# Patient Record
Sex: FEMALE | Race: WHITE | Employment: FULL TIME | URBAN - METROPOLITAN AREA
[De-identification: names, ages, dates, MRNs, and addresses within clinical notes are randomized per-mention and may not be internally consistent; named-entity substitution may affect disease eponyms.]

---

## 2020-12-01 LAB — PAP SMEAR, EXTERNAL: NORMAL

## 2021-09-03 ENCOUNTER — TELEPHONE (OUTPATIENT)
Dept: PRIMARY CARE CLINIC | Age: 51
End: 2021-09-03

## 2021-09-03 NOTE — TELEPHONE ENCOUNTER
----- Message from Loney Kayser sent at 9/3/2021  1:34 PM EDT -----  Subject: Message to Provider    QUESTIONS  Information for Provider? Patient is wanting to be a new patient of Luis White and there has been some miscommunication during the process. She   was told that Dr. Alta Fisher would look into accepting her and then she   would be called, but she received two calls and there is nothing   documented and no one from this office called her. She is just wanting to   be a new patient, and there is no availability with him.   ---------------------------------------------------------------------------  --------------  CALL BACK INFO  What is the best way for the office to contact you? OK to leave message on   voicemail  Preferred Call Back Phone Number? [phone]  ---------------------------------------------------------------------------  --------------  SCRIPT ANSWERS  Relationship to Patient?  Self

## 2021-09-29 ENCOUNTER — OFFICE VISIT (OUTPATIENT)
Dept: PRIMARY CARE CLINIC | Age: 51
End: 2021-09-29
Payer: COMMERCIAL

## 2021-09-29 VITALS
BODY MASS INDEX: 41.95 KG/M2 | HEART RATE: 80 BPM | OXYGEN SATURATION: 98 % | HEIGHT: 70 IN | SYSTOLIC BLOOD PRESSURE: 130 MMHG | TEMPERATURE: 97.1 F | DIASTOLIC BLOOD PRESSURE: 84 MMHG | WEIGHT: 293 LBS

## 2021-09-29 DIAGNOSIS — M17.0 PRIMARY OSTEOARTHRITIS OF BOTH KNEES: ICD-10-CM

## 2021-09-29 DIAGNOSIS — E11.9 TYPE 2 DIABETES MELLITUS WITHOUT COMPLICATION, WITHOUT LONG-TERM CURRENT USE OF INSULIN (HCC): ICD-10-CM

## 2021-09-29 DIAGNOSIS — E03.9 HYPOTHYROIDISM, UNSPECIFIED TYPE: Primary | ICD-10-CM

## 2021-09-29 DIAGNOSIS — M35.00 SJOGREN'S SYNDROME, WITH UNSPECIFIED ORGAN INVOLVEMENT (HCC): ICD-10-CM

## 2021-09-29 PROCEDURE — 3017F COLORECTAL CA SCREEN DOC REV: CPT | Performed by: FAMILY MEDICINE

## 2021-09-29 PROCEDURE — 2022F DILAT RTA XM EVC RTNOPTHY: CPT | Performed by: FAMILY MEDICINE

## 2021-09-29 PROCEDURE — G8427 DOCREV CUR MEDS BY ELIG CLIN: HCPCS | Performed by: FAMILY MEDICINE

## 2021-09-29 PROCEDURE — G8419 CALC BMI OUT NRM PARAM NOF/U: HCPCS | Performed by: FAMILY MEDICINE

## 2021-09-29 PROCEDURE — 99204 OFFICE O/P NEW MOD 45 MIN: CPT | Performed by: FAMILY MEDICINE

## 2021-09-29 PROCEDURE — 3046F HEMOGLOBIN A1C LEVEL >9.0%: CPT | Performed by: FAMILY MEDICINE

## 2021-09-29 PROCEDURE — 1036F TOBACCO NON-USER: CPT | Performed by: FAMILY MEDICINE

## 2021-09-29 RX ORDER — OMEGA-3 FATTY ACIDS/FISH OIL 300-1000MG
1 CAPSULE ORAL
COMMUNITY
End: 2022-02-14

## 2021-09-29 RX ORDER — VITAMIN B COMPLEX
1 CAPSULE ORAL DAILY
COMMUNITY

## 2021-09-29 RX ORDER — TRAMADOL HYDROCHLORIDE 50 MG/1
50 TABLET ORAL EVERY 6 HOURS PRN
COMMUNITY
End: 2021-09-29 | Stop reason: SDUPTHER

## 2021-09-29 RX ORDER — TRAMADOL HYDROCHLORIDE 50 MG/1
50 TABLET ORAL EVERY 8 HOURS PRN
Qty: 90 TABLET | Refills: 0 | Status: SHIPPED | OUTPATIENT
Start: 2021-09-29 | End: 2021-11-03 | Stop reason: SDUPTHER

## 2021-09-29 RX ORDER — LEVOTHYROXINE SODIUM 0.07 MG/1
75 TABLET ORAL DAILY
COMMUNITY
End: 2021-11-11 | Stop reason: SDUPTHER

## 2021-09-29 RX ORDER — BUDESONIDE AND FORMOTEROL FUMARATE DIHYDRATE 80; 4.5 UG/1; UG/1
2 AEROSOL RESPIRATORY (INHALATION) DAILY
COMMUNITY
End: 2022-04-15

## 2021-09-29 ASSESSMENT — PATIENT HEALTH QUESTIONNAIRE - PHQ9
SUM OF ALL RESPONSES TO PHQ QUESTIONS 1-9: 0
1. LITTLE INTEREST OR PLEASURE IN DOING THINGS: 0
2. FEELING DOWN, DEPRESSED OR HOPELESS: 0
SUM OF ALL RESPONSES TO PHQ9 QUESTIONS 1 & 2: 0

## 2021-09-29 ASSESSMENT — ENCOUNTER SYMPTOMS
EYES NEGATIVE: 1
ALLERGIC/IMMUNOLOGIC NEGATIVE: 1
GASTROINTESTINAL NEGATIVE: 1
RESPIRATORY NEGATIVE: 1

## 2021-09-29 NOTE — PROGRESS NOTES
21     Janae Rod    : 1970 Sex: female   Age: 46 y.o. Chief Complaint   Patient presents with    New Patient     was seeing Elijah Phillips in [place]        Prior to Admission medications    Medication Sig Start Date End Date Taking? Authorizing Provider   budesonide-formoterol (SYMBICORT) 80-4.5 MCG/ACT AERO Inhale 2 puffs into the lungs daily   Yes Historical Provider, MD   levothyroxine (SYNTHROID) 75 MCG tablet Take 75 mcg by mouth Daily   Yes Historical Provider, MD   metFORMIN (GLUCOPHAGE) 500 MG tablet Take 500 mg by mouth 2 times daily (with meals)   Yes Historical Provider, MD   CINNAMON PO Take by mouth   Yes Historical Provider, MD   TURMERIC PO Take by mouth   Yes Historical Provider, MD   Calcium Citrate-Vitamin D (CALCIUM + D PO) Take by mouth   Yes Historical Provider, MD   Ferrous Sulfate (IRON PO) Take by mouth   Yes Historical Provider, MD   b complex vitamins capsule Take 1 capsule by mouth daily   Yes Historical Provider, MD   ZINC PO Take by mouth   Yes Historical Provider, MD   Ascorbic Acid (VITAMIN C PO) Take by mouth   Yes Historical Provider, MD   Omega-3 Fatty Acids (FISH OIL PO) Take by mouth   Yes Historical Provider, MD   ibuprofen (ADVIL;MOTRIN) 200 MG CAPS Take 1 capsule by mouth   Yes Historical Provider, MD   PARDO PO Take by mouth   Yes Historical Provider, MD   Homeopathic Products (ARNICA EX) Apply topically   Yes Historical Provider, MD   traMADol (ULTRAM) 50 MG tablet Take 1 tablet by mouth every 8 hours as needed for Pain for up to 30 days. 9/29/21 10/29/21 Yes Angela Kerr DO          HPI: Patient evaluated today medical issues of hypothyroidism diabetes mellitus questionable Sjogren's disease severe degenerative joint disease of knees bilaterally all by history. Recently moved here from [place] this past month extensive medical history reviewed today. Medications all reviewed and refills provided.   Lab studies will be completed and we will then Chest    HERNIA REPAIR      Umbilical    KIDNEY STONE REMOVAL       No family history on file. Past Medical History:   Diagnosis Date    Diverticulitis     Hypothyroidism     Sjogren's disease ([facility]     Type 2 diabetes mellitus without complication ([facility]        Vitals:    09/29/21 1418   BP: 130/84   Pulse: 80   Temp: 97.1 °F (36.2 °C)   SpO2: 98%   Weight: (!) 380 lb (172.4 kg)   Height: 5' 10\" (1.778 m)     BP Readings from Last 3 Encounters:   09/29/21 130/84        Physical Exam  Vitals and nursing note reviewed. Constitutional:       Appearance: She is well-developed. HENT:      Head: Normocephalic. Right Ear: External ear normal.      Left Ear: External ear normal.      Nose: Nose normal.   Eyes:      Conjunctiva/sclera: Conjunctivae normal.      Pupils: Pupils are equal, round, and reactive to light. Cardiovascular:      Rate and Rhythm: Normal rate. Pulmonary:      Breath sounds: Normal breath sounds. Abdominal:      General: Bowel sounds are normal.      Palpations: Abdomen is soft. Musculoskeletal:         General: Normal range of motion. Cervical back: Normal range of motion and neck supple. Skin:     General: Skin is warm and dry. Neurological:      Mental Status: She is alert and oriented to person, place, and time. Psychiatric:         Behavior: Behavior normal.        Physical exam findings are actually stable. Blood pressure was well controlled. Heart is controlled lungs are clear. Medications reviewed and some refills provided on tramadol but we will further assess knees with x-ray studies and then further recommendations. All medications reviewed and maintain as prescribed. Lab studies to be completed and follow-up with next visit. Plan Per Assessment:  Nazario Young was seen today for new patient.     Diagnoses and all orders for this visit:    Hypothyroidism, unspecified type  -     CBC Auto Differential; Future  -     Comprehensive Metabolic Panel; Future  -     Hemoglobin A1C; Future  -     Lipid Panel; Future  -     T4; Future  -     TSH without Reflex; Future  -     PHYLLIS; Future  -     Rheumatoid Factor; Future  -     CYCLIC CITRUL PEPTIDE ANTIBODY, IGG; Future  -     SJOGREN'S ANTIBODIES (SS-A, SS-B); Future    Type 2 diabetes mellitus without complication, without long-term current use of insulin (HCC)  -     CBC Auto Differential; Future  -     Comprehensive Metabolic Panel; Future  -     Hemoglobin A1C; Future  -     Lipid Panel; Future  -     T4; Future  -     TSH without Reflex; Future  -     PHYLLIS; Future  -     Rheumatoid Factor; Future  -     CYCLIC CITRUL PEPTIDE ANTIBODY, IGG; Future  -     SJOGREN'S ANTIBODIES (SS-A, SS-B); Future    Sjogren's syndrome, with unspecified organ involvement ([facility] [address]  -     CBC Auto Differential; Future  -     Comprehensive Metabolic Panel; Future  -     Hemoglobin A1C; Future  -     Lipid Panel; Future  -     T4; Future  -     TSH without Reflex; Future  -     PHYLLIS; Future  -     Rheumatoid Factor; Future  -     CYCLIC CITRUL PEPTIDE ANTIBODY, IGG; Future  -     SJOGREN'S ANTIBODIES (SS-A, SS-B); Future    Primary osteoarthritis of both knees  -     CBC Auto Differential; Future  -     Comprehensive Metabolic Panel; Future  -     Hemoglobin A1C; Future  -     Lipid Panel; Future  -     T4; Future  -     TSH without Reflex; Future  -     PHYLLIS; Future  -     Rheumatoid Factor; Future  -     XR KNEE RIGHT (3 VIEWS); Future  -     XR KNEE LEFT (3 VIEWS); Future  -     CYCLIC CITRUL PEPTIDE ANTIBODY, IGG; Future  -     SJOGREN'S ANTIBODIES (SS-A, SS-B); Future  -     traMADol (ULTRAM) 50 MG tablet; Take 1 tablet by mouth every 8 hours as needed for Pain for up to 30 days. Return in about 1 week (around 10/6/2021). Nadiya Malcolm DO    Note was generated with the assistance of voice recognition software. Document was reviewed however may contain grammatical errors.

## 2021-10-11 ENCOUNTER — OFFICE VISIT (OUTPATIENT)
Dept: PRIMARY CARE CLINIC | Age: 51
End: 2021-10-11
Payer: COMMERCIAL

## 2021-10-11 VITALS
DIASTOLIC BLOOD PRESSURE: 84 MMHG | HEIGHT: 70 IN | BODY MASS INDEX: 54.52 KG/M2 | OXYGEN SATURATION: 98 % | HEART RATE: 88 BPM | TEMPERATURE: 96.9 F | SYSTOLIC BLOOD PRESSURE: 130 MMHG

## 2021-10-11 DIAGNOSIS — M17.0 PRIMARY OSTEOARTHRITIS OF BOTH KNEES: Primary | ICD-10-CM

## 2021-10-11 DIAGNOSIS — Z23 NEED FOR INFLUENZA VACCINATION: ICD-10-CM

## 2021-10-11 DIAGNOSIS — E03.9 HYPOTHYROIDISM, UNSPECIFIED TYPE: ICD-10-CM

## 2021-10-11 DIAGNOSIS — E11.9 TYPE 2 DIABETES MELLITUS WITHOUT COMPLICATION, WITHOUT LONG-TERM CURRENT USE OF INSULIN (HCC): ICD-10-CM

## 2021-10-11 DIAGNOSIS — M05.80 POLYARTHRITIS WITH POSITIVE RHEUMATOID FACTOR (HCC): ICD-10-CM

## 2021-10-11 PROCEDURE — G8427 DOCREV CUR MEDS BY ELIG CLIN: HCPCS | Performed by: FAMILY MEDICINE

## 2021-10-11 PROCEDURE — 2022F DILAT RTA XM EVC RTNOPTHY: CPT | Performed by: FAMILY MEDICINE

## 2021-10-11 PROCEDURE — 3017F COLORECTAL CA SCREEN DOC REV: CPT | Performed by: FAMILY MEDICINE

## 2021-10-11 PROCEDURE — 1036F TOBACCO NON-USER: CPT | Performed by: FAMILY MEDICINE

## 2021-10-11 PROCEDURE — 99214 OFFICE O/P EST MOD 30 MIN: CPT | Performed by: FAMILY MEDICINE

## 2021-10-11 PROCEDURE — G8417 CALC BMI ABV UP PARAM F/U: HCPCS | Performed by: FAMILY MEDICINE

## 2021-10-11 PROCEDURE — G8482 FLU IMMUNIZE ORDER/ADMIN: HCPCS | Performed by: FAMILY MEDICINE

## 2021-10-11 PROCEDURE — 3051F HG A1C>EQUAL 7.0%<8.0%: CPT | Performed by: FAMILY MEDICINE

## 2021-10-11 PROCEDURE — 90471 IMMUNIZATION ADMIN: CPT | Performed by: FAMILY MEDICINE

## 2021-10-11 PROCEDURE — 90674 CCIIV4 VAC NO PRSV 0.5 ML IM: CPT | Performed by: FAMILY MEDICINE

## 2021-10-11 ASSESSMENT — ENCOUNTER SYMPTOMS
GASTROINTESTINAL NEGATIVE: 1
ALLERGIC/IMMUNOLOGIC NEGATIVE: 1
RESPIRATORY NEGATIVE: 1
EYES NEGATIVE: 1

## 2021-10-11 NOTE — PROGRESS NOTES
10/11/21     Mayte Dai    : 1970 Sex: female   Age: 46 y.o. Chief Complaint   Patient presents with   [address]       Prior to Admission medications    Medication Sig Start Date End Date Taking? Authorizing Provider   budesonide-formoterol (SYMBICORT) 80-4.5 MCG/ACT AERO Inhale 2 puffs into the lungs daily   Yes Historical Provider, MD   levothyroxine (SYNTHROID) 75 MCG tablet Take 75 mcg by mouth Daily   Yes Historical Provider, MD   metFORMIN (GLUCOPHAGE) 500 MG tablet Take 500 mg by mouth 2 times daily (with meals)   Yes Historical Provider, MD   CINNAMON PO Take by mouth   Yes Historical Provider, MD   TURMERIC PO Take by mouth   Yes Historical Provider, MD   Calcium Citrate-Vitamin D (CALCIUM + D PO) Take by mouth   Yes Historical Provider, MD   Ferrous Sulfate (IRON PO) Take by mouth   Yes Historical Provider, MD   b complex vitamins capsule Take 1 capsule by mouth daily   Yes Historical Provider, MD   ZINC PO Take by mouth   Yes Historical Provider, MD   Ascorbic Acid (VITAMIN C PO) Take by mouth   Yes Historical Provider, MD   Omega-3 Fatty Acids (FISH OIL PO) Take by mouth   Yes Historical Provider, MD   ibuprofen (ADVIL;MOTRIN) 200 MG CAPS Take 1 capsule by mouth   Yes Historical Provider, MD   PARDO PO Take by mouth   Yes Historical Provider, MD   Homeopathic Products (ARNICA EX) Apply topically   Yes Historical Provider, MD   traMADol (ULTRAM) 50 MG tablet Take 1 tablet by mouth every 8 hours as needed for Pain for up to 30 days. 9/29/21 10/29/21 Yes Nancy Robledo DO          HPI: Patient evaluated this morning issues of osteoarthritic disease of the knees diabetes hypothyroid rheumatoid factor positive. We are going to refer her to both orthopedics as well as rheumatology for consultation. Medications reviewed maintain as prescribed. Flu shot to be updated today. Review of Systems   Constitutional: Negative. HENT: Negative. Eyes: Negative.     Respiratory: Negative. Gastrointestinal: Negative. Endocrine: Negative. Genitourinary: Negative. Musculoskeletal: Positive for arthralgias. Skin: Negative. Allergic/Immunologic: Negative. Neurological: Negative. Hematological: Negative. Psychiatric/Behavioral: Negative. Today systems review is stable and we will maintain present meds care. Current Outpatient Medications:     budesonide-formoterol (SYMBICORT) 80-4.5 MCG/ACT AERO, Inhale 2 puffs into the lungs daily, Disp: , Rfl:     levothyroxine (SYNTHROID) 75 MCG tablet, Take 75 mcg by mouth Daily, Disp: , Rfl:     metFORMIN (GLUCOPHAGE) 500 MG tablet, Take 500 mg by mouth 2 times daily (with meals), Disp: , Rfl:     CINNAMON PO, Take by mouth, Disp: , Rfl:     TURMERIC PO, Take by mouth, Disp: , Rfl:     Calcium Citrate-Vitamin D (CALCIUM + D PO), Take by mouth, Disp: , Rfl:     Ferrous Sulfate (IRON PO), Take by mouth, Disp: , Rfl:     b complex vitamins capsule, Take 1 capsule by mouth daily, Disp: , Rfl:     ZINC PO, Take by mouth, Disp: , Rfl:     Ascorbic Acid (VITAMIN C PO), Take by mouth, Disp: , Rfl:     Omega-3 Fatty Acids (FISH OIL PO), Take by mouth, Disp: , Rfl:     ibuprofen (ADVIL;MOTRIN) 200 MG CAPS, Take 1 capsule by mouth, Disp: , Rfl:     PARDO PO, Take by mouth, Disp: , Rfl:     Homeopathic Products (ARNICA EX), Apply topically, Disp: , Rfl:     traMADol (ULTRAM) 50 MG tablet, Take 1 tablet by mouth every 8 hours as needed for Pain for up to 30 days. , Disp: 90 tablet, Rfl: 0    No Known Allergies    Social History     Tobacco Use    Smoking status: Never Smoker    Smokeless tobacco: Never Used   Substance Use Topics    Alcohol use: Not on file    Drug use: Not on file      Past Surgical History:   Procedure Laterality Date    ANKLE SURGERY Bilateral      SECTION      CYST REMOVAL      Chest    HERNIA REPAIR      Umbilical    KIDNEY STONE REMOVAL       No family history on file.   Past Medical History:   Diagnosis Date    Diverticulitis     Hypothyroidism     Sjogren's disease ([facility] [address]     Type 2 diabetes mellitus without complication ([facility]        Vitals:    10/11/21 0859   BP: 130/84   Pulse: 88   Temp: 96.9 °F (36.1 °C)   SpO2: 98%   Height: 5' 10\" (1.778 m)     BP Readings from Last 3 Encounters:   10/11/21 130/84   09/29/21 130/84        Physical Exam  Vitals and nursing note reviewed. Constitutional:       Appearance: She is well-developed. HENT:      Head: Normocephalic. Right Ear: External ear normal.      Left Ear: External ear normal.      Nose: Nose normal.   Eyes:      Conjunctiva/sclera: Conjunctivae normal.      Pupils: Pupils are equal, round, and reactive to light. Cardiovascular:      Rate and Rhythm: Normal rate. Pulmonary:      Breath sounds: Normal breath sounds. Abdominal:      General: Bowel sounds are normal.      Palpations: Abdomen is soft. Musculoskeletal:         General: Normal range of motion. Cervical back: Normal range of motion and neck supple. Skin:     General: Skin is warm and dry. Neurological:      Mental Status: She is alert and oriented to person, place, and time. Psychiatric:         Behavior: Behavior normal.        Present vitals physical examination stable. Heart was regular lungs are clear. Reviewed labs and all quite stable with an A1c at 7.0. We will maintain present meds care and reassess again 4 weeks after consultations with rheumatology and orthopedics. Plan Per Assessment:  Abilio Ricketts was seen today for discuss labs.     Diagnoses and all orders for this visit:    Primary osteoarthritis of both knees  [facility]ides [facility]    Type 2 diabetes mellitus without complication, without long-term current use of insulin ([facility] [address]    Hypothyroidism, unspecified type    [facility] positive [facility] factor ([facility]  [facility] [id number]    Need for influenza vaccination  -     INFLUENZA, MDCK QUADV, 2 YRS AND OLDER, IM, PF, PREFILL SYR OR SDV, 0.5ML (FLUCELVAX QUADV, PF)            Return in about 4 weeks (around 11/8/2021). Terri Garnica DO    Note was generated with the assistance of voice recognition software. Document was reviewed however may contain grammatical errors.

## 2021-11-03 DIAGNOSIS — M17.0 PRIMARY OSTEOARTHRITIS OF BOTH KNEES: ICD-10-CM

## 2021-11-03 RX ORDER — TRAMADOL HYDROCHLORIDE 50 MG/1
50 TABLET ORAL EVERY 8 HOURS PRN
Qty: 90 TABLET | Refills: 0 | Status: SHIPPED
Start: 2021-11-03 | End: 2021-12-06 | Stop reason: SDUPTHER

## 2021-11-03 NOTE — TELEPHONE ENCOUNTER
----- Message from Muriel Moreno MA sent at 11/3/2021  2:22 PM EDT -----  Subject: Refill Request    QUESTIONS  Name of Medication? traMADol (ULTRAM) 50 MG tablet  Patient-reported dosage and instructions? 50 MG, TID   How many days do you have left? 0  [facility]? [id number]  Pharmacy phone number (if available)? [phone]  Additional Information for Provider? Etienne Beckwith called Sunday/ Monday for   refill and still haven't heard.   ---------------------------------------------------------------------------  --------------  CALL BACK INFO  What is the best way for the office to contact you? OK to leave message on   voicemail  Preferred Call Back Phone Number?  [phone]

## 2021-11-08 ENCOUNTER — OFFICE VISIT (OUTPATIENT)
Dept: RHEUMATOLOGY | Age: 51
End: 2021-11-08
Payer: COMMERCIAL

## 2021-11-08 VITALS
OXYGEN SATURATION: 98 % | DIASTOLIC BLOOD PRESSURE: 82 MMHG | HEART RATE: 80 BPM | HEIGHT: 70 IN | BODY MASS INDEX: 41.95 KG/M2 | WEIGHT: 293 LBS | SYSTOLIC BLOOD PRESSURE: 134 MMHG

## 2021-11-08 DIAGNOSIS — M17.0 PRIMARY OSTEOARTHRITIS OF BOTH KNEES: ICD-10-CM

## 2021-11-08 DIAGNOSIS — M05.80 POLYARTHRITIS WITH POSITIVE RHEUMATOID FACTOR (HCC): Primary | ICD-10-CM

## 2021-11-08 DIAGNOSIS — R21 RASH: ICD-10-CM

## 2021-11-08 PROBLEM — M35.00 SJOGREN'S SYNDROME (HCC): Status: RESOLVED | Noted: 2021-09-29 | Resolved: 2021-11-08

## 2021-11-08 LAB
C-REACTIVE PROTEIN: 2.4 MG/DL (ref 0–0.4)
SEDIMENTATION RATE, ERYTHROCYTE: 51 MM/HR (ref 0–20)

## 2021-11-08 PROCEDURE — 99204 OFFICE O/P NEW MOD 45 MIN: CPT | Performed by: INTERNAL MEDICINE

## 2021-11-08 RX ORDER — CELECOXIB 200 MG/1
200 CAPSULE ORAL 2 TIMES DAILY PRN
Qty: 60 CAPSULE | Refills: 3 | Status: SHIPPED
Start: 2021-11-08 | End: 2022-02-01 | Stop reason: SDUPTHER

## 2021-11-08 ASSESSMENT — ENCOUNTER SYMPTOMS
COLOR CHANGE: 0
NAUSEA: 0
TROUBLE SWALLOWING: 0
BACK PAIN: 1
ABDOMINAL PAIN: 0
SHORTNESS OF BREATH: 0
VOMITING: 0
COUGH: 0
DIARRHEA: 0

## 2021-11-08 NOTE — PROGRESS NOTES
Claudene Drivers 1970 is a 46 y.o. female, here for evaluation of the following chief complaint(s):  New Patient (Polyarthritis with positive rheumatoid factor)         ASSESSMENT/PLAN:    Claudene Drivers 1970 is a 46 y.o. female seen in consult for polyarthritis with positive rheumatoid factor. 1.  Polyarthritis-  She does have a rheumatoid factor of 15 which is a very low positive. CCP antibody is negative. She has known underlying osteoarthritis. I see no obvious synovitis on exam but exam is somewhat difficult due to body habitus. My suspicion for rheumatoid arthritis is low but will need to get further work-up as below. 2.  Osteoarthritis-either way she has underlying osteoarthritis. We will try her on Celebrex 200 mg twice daily as needed with food. Also advised that she can try Voltaren gel in the knees. 3.  Rash-on the face crossing the nasolabial folds. Lupus rashes do not cross the nasolabial folds and she has a negative PHYLLIS. I do not think it is an autoimmune rash. She may want to consider a dermatology evaluation. If work-up below is unrevealing she can follow-up with me on a as needed basis. If I see anything concerning on work-up below I will have her back for follow-up as appropriate. 1. Polyarthritis with positive rheumatoid factor (HCC)  -     C-Reactive Protein; Future  -     Sedimentation Rate; Future  -     XR HAND LEFT (MIN 3 VIEWS); Future  -     XR HAND RIGHT (MIN 3 VIEWS); Future  2. Primary osteoarthritis of both knees  3. Rash      Return if symptoms worsen or fail to improve. Subjective   SUBJECTIVE/OBJECTIVE:    HPI: Claudene Drivers 1970 is a 46 y.o. female seen in [place] for polyarthritis with positive rheumatoid factor. Patient states that she was living in [place] prior to moving here and did see a rheumatologist up there who diagnosed her with Sjogren's syndrome and benign hypermobility syndrome. However she has no dry eyes or dry mouth. Her PCP did blood work which shows a negative SSA and SSB. Has a negative PHYLLIS. She did however have very low positive rheumatoid factor of 15. Her CCP antibody is negative. Knees are most bothersome and x-rays show significant osteoarthritis. She will likely ultimately need replacements. Injections have not been beneficial.  Otherwise she does get some pain in the right hip and sometimes some swelling in the hands and wrists. Her joint pain seems to be worse at the end of the day. She is a  and does type a lot. She does have tramadol which helps. She also takes 2 Advil in the morning which helps to some degree. She has been having a rash on her face lately but otherwise no other extra-articular manifestations. Past Medical History:   Diagnosis Date    Diverticulitis     Hypothyroidism     S[facility]ogren's disease ([facility] [address] [facility]     Type 2 diabetes mellitus without complication ([facility] [address]         Review of Systems   Constitutional: Negative for fatigue and fever. HENT: Negative for mouth sores and trouble swallowing. Respiratory: Negative for cough and shortness of breath. Cardiovascular: Negative for chest pain. Gastrointestinal: Negative for abdominal pain, diarrhea, nausea and vomiting. Genitourinary: Negative for dysuria and hematuria. Musculoskeletal: Positive for arthralgias, back pain and joint swelling. Skin: Positive for rash. Negative for color change. Neurological: Negative for weakness and numbness. Hematological: Negative for adenopathy. All other systems reviewed and are negative. Objective   Vitals:    11/08/21 0834   BP: 134/82   Pulse: 80   SpO2: 98%      Physical Exam  Constitutional:       General: She is not in acute distress. Appearance: Normal appearance. HENT:      Head: Normocephalic and atraumatic. Right Ear: External ear normal.      Left Ear: External ear normal.      Nose: Nose normal.   Eyes:      General: No scleral icterus.   Pulmonary: Effort: Pulmonary effort is normal.   Musculoskeletal:         General: Tenderness present. No swelling or deformity. Comments: She has some tenderness in the ankles and large muscle groups but no obvious synovitis on exam.   Skin:     General: Skin is warm and dry. Findings: Rash present. Comments: Is a diffuse erythematous rash on the face which crosses the nasolabial folds. Neurological:      General: No focal deficit present. Mental Status: She is alert and oriented to person, place, and time. Mental status is at baseline. Psychiatric:         Mood and Affect: Mood normal.         Behavior: Behavior normal.            Lab Results   Component Value Date    WBC 8.2 09/29/2021    HGB 12.2 09/29/2021    HCT 38.1 09/29/2021    MCV 89.4 09/29/2021     09/29/2021     Lab Results   Component Value Date     09/29/2021    K 4.4 09/29/2021     09/29/2021    CO2 29 09/29/2021    BUN 11 09/29/2021    CREATININE 0.8 09/29/2021    GLUCOSE 113 (H) 09/29/2021    CALCIUM 9.5 09/29/2021    PROT 7.5 09/29/2021    LABALBU 4.1 09/29/2021    BILITOT 0.7 09/29/2021    ALKPHOS 57 09/29/2021    AST 23 09/29/2021    ALT 25 09/29/2021    LABGLOM >60 09/29/2021    GFRAA >60 09/29/2021     Lab Results   Component Value Date    PHYLLIS NEGATIVE 09/29/2021     No results found for: RHEUMFACTOR  No results found for: SEDRATE  No results found for: CRP         An electronic signature was used to authenticate this note. This note was generated with a voice recognition dictation system. Please disregard any errors or omission which have escaped my review.     --Joann Ingram, DO

## 2021-11-11 ENCOUNTER — OFFICE VISIT (OUTPATIENT)
Dept: PRIMARY CARE CLINIC | Age: 51
End: 2021-11-11
Payer: COMMERCIAL

## 2021-11-11 VITALS
HEIGHT: 70 IN | DIASTOLIC BLOOD PRESSURE: 84 MMHG | BODY MASS INDEX: 41.95 KG/M2 | OXYGEN SATURATION: 97 % | SYSTOLIC BLOOD PRESSURE: 126 MMHG | TEMPERATURE: 97.6 F | HEART RATE: 74 BPM | WEIGHT: 293 LBS

## 2021-11-11 DIAGNOSIS — E66.01 CLASS 3 SEVERE OBESITY WITH BODY MASS INDEX (BMI) OF 50.0 TO 59.9 IN ADULT, UNSPECIFIED OBESITY TYPE, UNSPECIFIED WHETHER SERIOUS COMORBIDITY PRESENT (HCC): ICD-10-CM

## 2021-11-11 DIAGNOSIS — R20.8 BURNING SENSATION OF FEET: ICD-10-CM

## 2021-11-11 DIAGNOSIS — E03.9 HYPOTHYROIDISM, UNSPECIFIED TYPE: Primary | ICD-10-CM

## 2021-11-11 DIAGNOSIS — M17.0 PRIMARY OSTEOARTHRITIS OF BOTH KNEES: ICD-10-CM

## 2021-11-11 DIAGNOSIS — E11.9 TYPE 2 DIABETES MELLITUS WITHOUT COMPLICATION, WITHOUT LONG-TERM CURRENT USE OF INSULIN (HCC): ICD-10-CM

## 2021-11-11 DIAGNOSIS — M05.80 POLYARTHRITIS WITH POSITIVE RHEUMATOID FACTOR (HCC): ICD-10-CM

## 2021-11-11 PROBLEM — E66.813 CLASS 3 SEVERE OBESITY WITH BODY MASS INDEX (BMI) OF 50.0 TO 59.9 IN ADULT: Status: ACTIVE | Noted: 2021-11-11

## 2021-11-11 PROCEDURE — 99214 OFFICE O/P EST MOD 30 MIN: CPT | Performed by: FAMILY MEDICINE

## 2021-11-11 PROCEDURE — 1036F TOBACCO NON-USER: CPT | Performed by: FAMILY MEDICINE

## 2021-11-11 PROCEDURE — 2022F DILAT RTA XM EVC RTNOPTHY: CPT | Performed by: FAMILY MEDICINE

## 2021-11-11 PROCEDURE — 3017F COLORECTAL CA SCREEN DOC REV: CPT | Performed by: FAMILY MEDICINE

## 2021-11-11 PROCEDURE — G8427 DOCREV CUR MEDS BY ELIG CLIN: HCPCS | Performed by: FAMILY MEDICINE

## 2021-11-11 PROCEDURE — 3051F HG A1C>EQUAL 7.0%<8.0%: CPT | Performed by: FAMILY MEDICINE

## 2021-11-11 PROCEDURE — G8417 CALC BMI ABV UP PARAM F/U: HCPCS | Performed by: FAMILY MEDICINE

## 2021-11-11 PROCEDURE — G8482 FLU IMMUNIZE ORDER/ADMIN: HCPCS | Performed by: FAMILY MEDICINE

## 2021-11-11 RX ORDER — SPIRONOLACTONE 50 MG/1
50 TABLET, FILM COATED ORAL DAILY
COMMUNITY
End: 2021-11-11 | Stop reason: SDUPTHER

## 2021-11-11 RX ORDER — SPIRONOLACTONE 50 MG/1
50 TABLET, FILM COATED ORAL DAILY
Qty: 90 TABLET | Refills: 2 | Status: SHIPPED
Start: 2021-11-11 | End: 2022-02-01 | Stop reason: SDUPTHER

## 2021-11-11 RX ORDER — LEVOTHYROXINE SODIUM 0.07 MG/1
75 TABLET ORAL DAILY
Qty: 90 TABLET | Refills: 2 | Status: SHIPPED
Start: 2021-11-11 | End: 2022-02-01 | Stop reason: SDUPTHER

## 2021-11-11 ASSESSMENT — ENCOUNTER SYMPTOMS
ALLERGIC/IMMUNOLOGIC NEGATIVE: 1
RESPIRATORY NEGATIVE: 1
EYES NEGATIVE: 1
GASTROINTESTINAL NEGATIVE: 1

## 2021-11-11 NOTE — PROGRESS NOTES
21     Caryn Brown    : 1970 Sex: female   Age: 46 y.o. Chief Complaint   Patient presents with    Knee Pain     saw ortho and rheumatology       Prior to Admission medications    Medication Sig Start Date End Date Taking? Authorizing Provider   metFORMIN (GLUCOPHAGE) 500 MG tablet Take 1 tablet by mouth 2 times daily (with meals) 21  Yes Ortiz Sheehan, DO   spironolactone (ALDACTONE) 50 MG tablet Take 1 tablet by mouth daily 21  Yes Ortiz Sheehan DO   levothyroxine (SYNTHROID) 75 MCG tablet Take 1 tablet by mouth Daily 21  Yes Ortiz Sheehan,    celecoxib (CELEBREX) 200 MG capsule Take 1 capsule by mouth 2 times daily as needed for Pain 21  Yes Anoop Soriano, DO   traMADol (ULTRAM) 50 MG tablet Take 1 tablet by mouth every 8 hours as needed for Pain for up to 30 days. 11/3/21 12/3/21 Yes James Sheehan, DO   budesonide-formoterol (SYMBICORT) 80-4.5 MCG/ACT AERO Inhale 2 puffs into the lungs daily   Yes Historical Provider, MD   CINNAMON PO Take by mouth   Yes Historical Provider, MD   TURMERIC PO Take by mouth   Yes Historical Provider, MD   Calcium Citrate-Vitamin D (CALCIUM + D PO) Take by mouth   Yes Historical Provider, MD   Ferrous Sulfate (IRON PO) Take by mouth   Yes Historical Provider, MD   b complex vitamins capsule Take 1 capsule by mouth daily   Yes Historical Provider, MD   ZINC PO Take by mouth   Yes Historical Provider, MD   Ascorbic Acid (VITAMIN C PO) Take by mouth   Yes Historical Provider, MD   Omega-3 Fatty Acids (FISH OIL PO) Take by mouth   Yes Historical Provider, MD   ibuprofen (ADVIL;MOTRIN) 200 MG CAPS Take 1 capsule by mouth   Yes Historical Provider, MD PARDO PO Take by mouth   Yes Historical Provider, MD      Body mass index is 54.81 kg/m². HPI: Patient evaluated this morning multiple medical issues hypothyroid diabetes osteoarthritic disease of the knees which is severe.  She was told that surgery was not an option capsule by mouth, Disp: , Rfl:     PARDO PO, Take by mouth, Disp: , Rfl:     No Known Allergies    Social History     Tobacco Use    Smoking status: Never Smoker    Smokeless tobacco: Never Used   Substance Use Topics    Alcohol use: Not on file    Drug use: Not on file      Past Surgical History:   Procedure Laterality Date    ANKLE SURGERY Bilateral      SECTION      CYST REMOVAL      Chest    HERNIA REPAIR      Umbilical    KIDNEY STONE REMOVAL       No family history on file. Past Medical History:   Diagnosis Date    Diverticulitis     Hypothyroidism     S[facility]ogren's [facility] ([facility]     Type 2 diabetes mellitus without complication ([facility]        Vitals:    21 0853   BP: 126/84   Pulse: 74   Temp: 97.6 °F (36.4 °C)   SpO2: 97%   Weight: (!) 382 lb (173.3 kg)   Height: 5' 10\" (1.778 m)     BP Readings from Last 3 Encounters:   21 126/84   21 134/82   10/11/21 130/84        Physical Exam  Vitals and nursing note reviewed. Constitutional:       Appearance: She is well-developed. HENT:      Head: Normocephalic. Right Ear: External ear normal.      Left Ear: External ear normal.      Nose: Nose normal.   Eyes:      Conjunctiva/sclera: Conjunctivae normal.      Pupils: Pupils are equal, round, and reactive to light. Cardiovascular:      Rate and Rhythm: Normal rate. Pulmonary:      Breath sounds: Normal breath sounds. Abdominal:      General: Bowel sounds are normal.      Palpations: Abdomen is soft. Musculoskeletal:         General: Normal range of motion. Cervical back: Normal range of motion and neck supple. Skin:     General: Skin is warm and dry. Neurological:      Mental Status: She is alert and oriented to person, place, and time. Psychiatric:         Behavior: Behavior normal.     Today's vitals physical examination stable. Medications as prescribed. Reassessment 3 months sooner if problems.  EMG nerve conduction to be completed and will discuss

## 2021-11-23 ENCOUNTER — TELEPHONE (OUTPATIENT)
Dept: PRIMARY CARE CLINIC | Age: 51
End: 2021-11-23

## 2021-12-06 DIAGNOSIS — M17.0 PRIMARY OSTEOARTHRITIS OF BOTH KNEES: ICD-10-CM

## 2021-12-06 RX ORDER — TRAMADOL HYDROCHLORIDE 50 MG/1
50 TABLET ORAL EVERY 8 HOURS PRN
Qty: 90 TABLET | Refills: 0 | Status: SHIPPED
Start: 2021-12-06 | End: 2022-01-06

## 2021-12-06 RX ORDER — TRAMADOL HYDROCHLORIDE 50 MG/1
50 TABLET ORAL EVERY 8 HOURS PRN
Qty: 90 TABLET | Refills: 0 | OUTPATIENT
Start: 2021-12-06 | End: 2022-01-05

## 2021-12-06 NOTE — TELEPHONE ENCOUNTER
Called and spoke with pt and states tramadol was filled at [facility]. Called over to brien and confirmed she did get this filled 11/5 with them for a 30 day supply. She has had some issues with them and would like script to go to [facility] instead.

## 2022-01-05 DIAGNOSIS — M17.0 PRIMARY OSTEOARTHRITIS OF BOTH KNEES: ICD-10-CM

## 2022-01-06 RX ORDER — TRAMADOL HYDROCHLORIDE 50 MG/1
TABLET ORAL
Qty: 90 TABLET | Refills: 0 | Status: SHIPPED
Start: 2022-01-06 | End: 2022-01-31 | Stop reason: SDUPTHER

## 2022-01-31 DIAGNOSIS — M17.0 PRIMARY OSTEOARTHRITIS OF BOTH KNEES: ICD-10-CM

## 2022-01-31 RX ORDER — TRAMADOL HYDROCHLORIDE 50 MG/1
50 TABLET ORAL EVERY 8 HOURS PRN
Qty: 90 TABLET | Refills: 0 | Status: SHIPPED | OUTPATIENT
Start: 2022-01-31 | End: 2022-03-02

## 2022-02-01 RX ORDER — SPIRONOLACTONE 50 MG/1
50 TABLET, FILM COATED ORAL DAILY
Qty: 90 TABLET | Refills: 2 | Status: SHIPPED
Start: 2022-02-01 | End: 2022-07-15

## 2022-02-01 RX ORDER — CELECOXIB 200 MG/1
200 CAPSULE ORAL 2 TIMES DAILY PRN
Qty: 180 CAPSULE | Refills: 2 | Status: SHIPPED | OUTPATIENT
Start: 2022-02-01

## 2022-02-01 RX ORDER — LEVOTHYROXINE SODIUM 0.07 MG/1
75 TABLET ORAL DAILY
Qty: 90 TABLET | Refills: 2 | Status: SHIPPED
Start: 2022-02-01 | End: 2022-08-25 | Stop reason: SDUPTHER

## 2022-02-08 ENCOUNTER — OFFICE VISIT (OUTPATIENT)
Dept: RHEUMATOLOGY | Age: 52
End: 2022-02-08
Payer: COMMERCIAL

## 2022-02-08 VITALS
HEART RATE: 80 BPM | DIASTOLIC BLOOD PRESSURE: 78 MMHG | WEIGHT: 293 LBS | RESPIRATION RATE: 18 BRPM | BODY MASS INDEX: 41.95 KG/M2 | HEIGHT: 70 IN | SYSTOLIC BLOOD PRESSURE: 124 MMHG | OXYGEN SATURATION: 99 %

## 2022-02-08 DIAGNOSIS — M05.80 POLYARTHRITIS WITH POSITIVE RHEUMATOID FACTOR (HCC): Primary | ICD-10-CM

## 2022-02-08 DIAGNOSIS — R20.8 BURNING SENSATION OF FEET: ICD-10-CM

## 2022-02-08 DIAGNOSIS — Z51.81 MEDICATION MONITORING ENCOUNTER: ICD-10-CM

## 2022-02-08 DIAGNOSIS — M17.0 PRIMARY OSTEOARTHRITIS OF BOTH KNEES: ICD-10-CM

## 2022-02-08 DIAGNOSIS — M05.80 POLYARTHRITIS WITH POSITIVE RHEUMATOID FACTOR (HCC): ICD-10-CM

## 2022-02-08 LAB
C-REACTIVE PROTEIN: 1.7 MG/DL (ref 0–0.4)
RHEUMATOID FACTOR: 13 IU/ML (ref 0–13)
SEDIMENTATION RATE, ERYTHROCYTE: 48 MM/HR (ref 0–20)

## 2022-02-08 PROCEDURE — 1036F TOBACCO NON-USER: CPT | Performed by: INTERNAL MEDICINE

## 2022-02-08 PROCEDURE — G8427 DOCREV CUR MEDS BY ELIG CLIN: HCPCS | Performed by: INTERNAL MEDICINE

## 2022-02-08 PROCEDURE — 3017F COLORECTAL CA SCREEN DOC REV: CPT | Performed by: INTERNAL MEDICINE

## 2022-02-08 PROCEDURE — G8482 FLU IMMUNIZE ORDER/ADMIN: HCPCS | Performed by: INTERNAL MEDICINE

## 2022-02-08 PROCEDURE — G8417 CALC BMI ABV UP PARAM F/U: HCPCS | Performed by: INTERNAL MEDICINE

## 2022-02-08 PROCEDURE — 99214 OFFICE O/P EST MOD 30 MIN: CPT | Performed by: INTERNAL MEDICINE

## 2022-02-08 ASSESSMENT — ENCOUNTER SYMPTOMS
NAUSEA: 0
ABDOMINAL PAIN: 0
COLOR CHANGE: 0
BACK PAIN: 1
SHORTNESS OF BREATH: 0
DIARRHEA: 0
TROUBLE SWALLOWING: 0
VOMITING: 0
COUGH: 0

## 2022-02-08 NOTE — PATIENT INSTRUCTIONS
I see no obvious signs of inflammatory arthritis but will need further workup    No changes to celebrex

## 2022-02-08 NOTE — PROGRESS NOTES
Kanchan Temple 1970 is a 46 y.o. female, here for evaluation of the following chief complaint(s):  Follow-up (Patient here for a 3 month follow up on polyarthritis and discuss lab results.  )         ASSESSMENT/PLAN:    Kanchan Temple 1970 is a 46 y.o. female seen in follow-up for polyarthritis with positive rheumatoid factor. 1.  Polyarthritis-  She does have a rheumatoid factor of 15 which is a very low positive. CCP antibody is negative. She has known underlying osteoarthritis. I see no obvious synovitis on exam but exam is somewhat difficult due to body habitus. Last visit inflammatory markers were little high which I suspect is just the norm for her but will need further work-up as below. 2.  Osteoarthritis-either way she has underlying osteoarthritis. She has had a good response to Celebrex 200 mg twice daily as needed with food. 3.  Medication monitoring-we will need to monitor renal function periodically. She is also taking famotidine while on NSAIDs. If work-up below is unrevealing she can follow-up with me on a as needed basis. If I see anything concerning on work-up below I will have her back for follow-up as appropriate. 1. Polyarthritis with positive rheumatoid factor (HCC)  -     C-Reactive Protein; Future  -     Sedimentation Rate; Future  -     Rheumatoid Factor; Future  -     Cyclic Citrul Peptide Antibody, IgG; Future  -     MISCELLANEOUS SENDOUT 1; Future  2. Burning sensation of feet  3. Primary osteoarthritis of both knees  4. Medication monitoring encounter      Return if symptoms worsen or fail to improve. Subjective   SUBJECTIVE/OBJECTIVE:    HPI: Kanchan Temple 1970 is a 46 y.o. female seen in follow-up for polyarthritis with positive rheumatoid factor. Last visit there is no signs of synovitis on exam and the suspicion was that we are dealing with osteoarthritis. However her inflammatory markers were slightly elevated.   I suspect this is likely the norm for her but I do want to see her back at least once to make sure there is no signs of a developing inflammatory arthritis. Last visit we did start her on Celebrex for osteoarthritis and she has had a good response. She takes it pretty much twice daily. She does take famotidine with it. She has underlying peripheral neuropathy. She has no extra-articular manifestations. Initial history: Patient states that she was living in [place] prior to moving here and did see a rheumatologist up there who diagnosed her with Sjogren's syndrome and benign hypermobility syndrome. However she has no dry eyes or dry mouth. Her PCP did blood work which shows a negative SSA and SSB. Has a negative PHYLLIS. She did however have very low positive rheumatoid factor of 15. Her CCP antibody is negative. Knees are most bothersome and x-rays show significant osteoarthritis. She will likely ultimately need replacements. Injections have not been beneficial.  Otherwise she does get some pain in the right hip and sometimes some swelling in the hands and wrists. Her joint pain seems to be worse at the end of the day. She is a  and does type a lot. She does have tramadol which helps. She also takes 2 Advil in the morning which helps to some degree. She has been having a rash on her face lately but otherwise no other extra-articular manifestations. Past Medical History:   Diagnosis Date    Diverticulitis     Hypothyroidism     Morbid obesity due to excess calories (HCC)     Sjogren's disease ([address]     Type 2 diabetes mellitus without complication ([facility] [address]         Review of Systems   Constitutional: Negative for fatigue and fever. HENT: Negative for mouth sores and trouble swallowing. Respiratory: Negative for cough and shortness of breath. Cardiovascular: Negative for chest pain. Gastrointestinal: Negative for abdominal pain, diarrhea, nausea and vomiting.    Genitourinary: Negative for dysuria and hematuria. Musculoskeletal: Positive for arthralgias and back pain. Negative for joint swelling. Skin: Positive for rash. Negative for color change. Neurological: Negative for weakness and numbness. Hematological: Negative for adenopathy. All other systems reviewed and are negative. Objective   Vitals:    02/08/22 0830   BP: 124/78   Pulse: 80   Resp: 18   SpO2: 99%      Physical Exam  Constitutional:       General: She is not in acute distress. Appearance: Normal appearance. HENT:      Head: Normocephalic and atraumatic. Right Ear: External ear normal.      Left Ear: External ear normal.      Nose: Nose normal.   Eyes:      General: No scleral icterus. Pulmonary:      Effort: Pulmonary effort is normal.   Musculoskeletal:         General: Deformity present. No swelling or tenderness. Comments: She has some Heberden's nodes but no evidence of synovitis on exam.   Skin:     General: Skin is warm and dry. Findings: Rash present. Comments: Is a diffuse erythematous rash on the face which crosses the nasolabial folds. Neurological:      General: No focal deficit present. Mental Status: She is alert and oriented to person, place, and time. Mental status is at baseline.    Psychiatric:         Mood and Affect: Mood normal.         Behavior: Behavior normal.            Lab Results   Component Value Date    WBC 8.2 09/29/2021    HGB 12.2 09/29/2021    HCT 38.1 09/29/2021    MCV 89.4 09/29/2021     09/29/2021     Lab Results   Component Value Date     09/29/2021    K 4.4 09/29/2021     09/29/2021    CO2 29 09/29/2021    BUN 11 09/29/2021    CREATININE 0.8 09/29/2021    GLUCOSE 113 (H) 09/29/2021    CALCIUM 9.5 09/29/2021    PROT 7.5 09/29/2021    LABALBU 4.1 09/29/2021    BILITOT 0.7 09/29/2021    ALKPHOS 57 09/29/2021    AST 23 09/29/2021    ALT 25 09/29/2021    LABGLOM >60 09/29/2021    GFRAA >60 09/29/2021     Lab Results   Component Value Date    PHYLLIS NEGATIVE 09/29/2021     No results found for: RHEUMFACTOR  Lab Results   Component Value Date    SEDRATE 51 (H) 11/08/2021     Lab Results   Component Value Date    CRP 2.4 (H) 11/08/2021            An electronic signature was used to authenticate this note. This note was generated with a voice recognition dictation system. Please disregard any errors or omission which have escaped my review.     --Goldy Raya, DO

## 2022-02-11 LAB — CYCLIC CITRULLINATED PEPTIDE ANTIBODY IGG: 4.3 U/ML (ref 0–7)

## 2022-02-14 ENCOUNTER — OFFICE VISIT (OUTPATIENT)
Dept: BARIATRICS/WEIGHT MGMT | Age: 52
End: 2022-02-14
Payer: COMMERCIAL

## 2022-02-14 VITALS
SYSTOLIC BLOOD PRESSURE: 128 MMHG | TEMPERATURE: 97 F | HEIGHT: 69 IN | HEART RATE: 81 BPM | BODY MASS INDEX: 43.4 KG/M2 | WEIGHT: 293 LBS | DIASTOLIC BLOOD PRESSURE: 72 MMHG

## 2022-02-14 DIAGNOSIS — E11.43 TYPE 2 DIABETES MELLITUS WITH DIABETIC AUTONOMIC NEUROPATHY, WITHOUT LONG-TERM CURRENT USE OF INSULIN (HCC): ICD-10-CM

## 2022-02-14 DIAGNOSIS — G89.29 CHRONIC PAIN OF BOTH KNEES: Primary | ICD-10-CM

## 2022-02-14 DIAGNOSIS — E66.01 CLASS 3 SEVERE OBESITY DUE TO EXCESS CALORIES WITH SERIOUS COMORBIDITY AND BODY MASS INDEX (BMI) OF 50.0 TO 59.9 IN ADULT (HCC): ICD-10-CM

## 2022-02-14 DIAGNOSIS — M25.562 CHRONIC PAIN OF BOTH KNEES: Primary | ICD-10-CM

## 2022-02-14 DIAGNOSIS — M25.561 CHRONIC PAIN OF BOTH KNEES: Primary | ICD-10-CM

## 2022-02-14 DIAGNOSIS — G47.33 OSA (OBSTRUCTIVE SLEEP APNEA): ICD-10-CM

## 2022-02-14 PROBLEM — J45.20 MILD INTERMITTENT ASTHMA WITHOUT COMPLICATION: Status: ACTIVE | Noted: 2022-02-14

## 2022-02-14 PROCEDURE — 99205 OFFICE O/P NEW HI 60 MIN: CPT | Performed by: INTERNAL MEDICINE

## 2022-02-14 PROCEDURE — G2212 PROLONG OUTPT/OFFICE VIS: HCPCS | Performed by: INTERNAL MEDICINE

## 2022-02-14 PROCEDURE — 99202 OFFICE O/P NEW SF 15 MIN: CPT

## 2022-02-14 NOTE — PROGRESS NOTES
CC -   Chronic knee pain - arthritis - needs BMI <42 for TKA, weight gain. BACKGROUND -     First visit: 2/14/22     Obesity (all weight in lbs)  Began early adulthood  Initial BMI 55.50, Wt 375.8  HS Grad wt 230   Lowest   wt 230   Highest  wt 380 (2021 June)  Pattern of wt gain: gradual  Wt change past yr: -5 lbs  Most wt lost: 40 lbs  Other diets attempted: [facility]. Did a lot of exercise when young    Desire to lose weight: 8/10  Problem posed by appetite: 4-5/10    Initial Diet:    Number of meals per day - 3    Number of snacks per day - 0-1 (about once a week)    Meal volume - 12\" plate,  occasional seconds    Fast food/convenience store - 0-1x/week    Restaurants (not fast food) - 0x/week (included in fast food)   Sweets - 3d/week (pastries, pumpkin bread, fruity chewy)   Chips - 0-1d/week   Crackers/pretzels - 0-1d/week   Nuts - 0d/week (diverticulitis)   Peanut Butter - 1d/week   Popcorn - 0d/week   Dried fruit - 0d/week   Whole fruit - 7d/week (bananas, apples, clementines, pineapples grapes)   Breakfast cereal - 0-1d/week   Granola/Protein/Energy bar - 0d/week   Sugar sweetened beverages - sweet tea ([place] 8-16 oz/day). Coffee with creamer (75 Eric/tbsp) - 3-4 tbsp/cup 3-4x/wk + 1 tbsp sugar. No soda/pops. Protein - No supplements   Fiber - No supplements (used to have fiber one bars)    Breakfast: cottage cheese with cherry tomatoes etc, or toast with peanut butter or cheese etc.  Lunch: TV dinner (pot pie, chinese etc), or yogurt + slice of cheese etc.  Dinner: varies, baked potatoes, roast and broccoli. Had honey fried chicken breast, 2 eggs, country fried potatoes served with biscuit (half of it). Initial Exercise:    Gym membership - no    Walking - no    Running - no    Resistance - no    Aerobic class - no. Has total body vibration machine.         Initial Sleep: Bedtime: 10-midnight, wake up time: 7-8 am - usually rested with CPAP + o2 2l/min, daytime naps: no.    Weight scale at home: yes, takes weight: daily  Food scale: yes (but unsure where it is)  ______________________    [facility] PHYLLIS -  Past Medical History:   Diagnosis Date    Diverticulitis     Hypothyroidism     Mild intermittent asthma without complication     Morbid obesity due to excess calories ([facility] [address]     Sjogren's disease ([facility] [address]     Type 2 diabetes mellitus without complication ([facility] [address]    Unclear if patient has Sjogren's disease, workup in progress. Past Surgical History:   Procedure Laterality Date    ANKLE SURGERY Bilateral      SECTION      CYST REMOVAL      Chest    HERNIA REPAIR      Umbilical    KIDNEY STONE REMOVAL       Prior to Admission medications    Medication Sig Start Date End Date Taking? Authorizing Provider   levothyroxine (SYNTHROID) 75 MCG tablet Take 1 tablet by mouth Daily 22   Kelsey Sheehan, DO   metFORMIN (GLUCOPHAGE) 500 MG tablet Take 1 tablet by mouth 2 times daily (with meals) 22   Mary Jo Bustos, DO   spironolactone (ALDACTONE) 50 MG tablet Take 1 tablet by mouth daily 22   Mary Jo Bustos, DO   celecoxib (CELEBREX) 200 MG capsule Take 1 capsule by mouth 2 times daily as needed for Pain 22   Kelsey Sheehan, DO   traMADol (ULTRAM) 50 MG tablet Take 1 tablet by mouth every 8 hours as needed for Pain for up to 30 days.  1/31/22 3/2/22  Mary Jo Bustos, DO   Handicap Placard MISC by Does not apply route DURATION 5 YEARS 21   Kelsey Sheehan, DO   budesonide-formoterol (SYMBICORT) 80-4.5 MCG/ACT AERO Inhale 2 puffs into the lungs daily    Historical Provider, MD   CINNAMON PO Take by mouth    Historical Provider, MD   TURMERIC PO Take by mouth    Historical Provider, MD   Calcium Citrate-Vitamin D (CALCIUM + D PO) Take by mouth    Historical Provider, MD   Ferrous Sulfate (IRON PO) Take by mouth    Historical Provider, MD   b complex vitamins capsule Take 1 capsule by mouth daily    Historical Provider, MD   ZINC PO Take by mouth    Historical Provider, MD   Ascorbic Acid (VITAMIN C PO) Take by mouth    Historical Provider, MD   Omega-3 Fatty Acids (FISH OIL PO) Take by mouth    Historical Provider, MD   PARDO PO Take by mouth    Historical Provider, MD       Allergies: No Known Allergies, environmental allergies. Family history: DM: mother, Heart disease: father (passed away with MI at 43 yrs of age), father side. Social history: smoking: never ; Alcohol: no , work: sedentary work. 3 steps and has difficulty climbing up and down. ROS -  Card - no CP, but gets wheezes when walking a block. GI - no N/V, has loose BM (watery, unkown why but had workup) once to twice a day. PE -  Gen : /72 (Site: Left Upper Arm, Position: Sitting, Cuff Size: Thigh)   Pulse 81   Temp 97 °F (36.1 °C) (Temporal)   Ht 5' 9\" (1.753 m)   Wt (!) 375 lb 12.8 oz (170.5 kg)   BMI 55.50 kg/m²    WN, WD, NAD  Lung: Nml resp effort, CTA B/L at present  Heart:  RRR w/o MGR, .+ LE pitting edema b/l  Psych: Normal mood   Full affect  Neuro: Moves all ext well  ______________________    HISTORY & ASSESSMENT/PLAN -     Problem 1  - Bilateral knee pain   HPI   - Ongoing, gradually progressing which pt feels like due to weight gain. Was told it is bone on bone, will need BMI<42 for surgery (<284 lbs)  Assessment  - Uncontrolled, weight gain is likely primary contributor   Plan   - currently is on Celebrex and tramadol prn that seems to be helping. Weight reduction can help not only with pain but will help her undergo surgery as well. Weight reduction per plan below    Problem 2  - Obesity   HPI   - See above Background for description    Weight  Date    375.8 lbs 2/14/22  DEN = 2347 Eric/d = 43324 Eric/wk  Wt effect of HR foods  = 1750 Eric/wk = 250 Eric/d= 10% DEN = 26 lb/year. Assessment  - Uncontrolled  Plan   - Does not want surgery. Does not want VLCD. Would like calorie counting with low calorie diet. Does not want appetite suppressant at this time.  Planned as follows:  Patient Instructions Rules:  · Count every calorie every day  · Limit sweets to one day per month  · Limit chips/crackers/pretzels/nuts/popcorn to 100 martir/day  · Eliminate all sugar sweetened beverages (including fruit juice)  · Limit restaurants (including fast food and food from a convenience store) to one time every two weeks while in town    Requirements:  · Make sure protein intake is at least 75 grams per day (do not count protein every day; instead spot check your intake every 2-3 weeks and make sure what you think you are getting is close to accurate; consider using a protein shake if needed; these are in the pharmacy section of the stores, not the grocery section; Premier, Pure Protein and Fairlife are relatively inexpensive and taste good to most patients; other options are Nectar, Boost Max, Ensure Max, BeneProtein and GNC lean (which is lactose-free); Nectar fruit, Premier Protein Clear, IsoPure Protein Drink, and Protein 2 O are water-based options; Quest (or Cosco, which is cheaper and is ordered on [employer]) and the [employer] protein bars can also be used, but have less protein in them )  (Disclaimer: Dietary supplements rarely have their listed ingredients and the amount of each verified by a third party other. Sometimes they give verification for their claims to be GMO and gluten free and to be organic. However, even such verifications as these may still be untrustworthy.) (<200 martir, >25 g protein)    · Make sure that fiber intake is at least 22 grams per day. Do this by either eating 12 tablespoons of the original, plain Fiber One cereal every day or 4 tablespoons of wheat dextrin powder (Benefiber or a generic brand) every day. Work up to this amount slowly by starting with only one-eighth to one-fourth of the target amount and then adding another one-eighth to one-fourth every one or two weeks until reaching the target.     · Take one multivitamin every day    Targets:  · Limit calorie intake to 1400 calories/day  · Walk 30 minutes daily or equivalent (210 min/week)  · Avoid eating 2 hours within bedtime. Tips:  · Do not eat outside of the dining room or the kitchen  · Do not eat while watching TV, videos, working on the computer or using a smart phone  · Do not eat food out of a multi-serving bag or container. · Establish 6 hours of food-free \"time-out\" periods (times you don't eat) each day. No period can be less than 1 hour long. The periods need to be the same every day for days that are the same (for example, workdays would have one set of food free periods and weekends would have another set of days). These six hours are in addition to the two hours before bedtime and the time spent sleeping. Return to see me in 4 weeks. Problem 3  - MELCHOR   HPI   - Probably has OHS as well. using CPAP, used to have tiredness, but better after CPAP  Assessment  - controlled. Plan   - continue CPAP with o2. Weight loss can help. Problem 4  - DM2 with peripheral neuropathy per patient  HPI   - Last hba1c was 7.0, was off metformin for some time and found out her BGL was high, has been back on metformin and her BGL is controlled. Assessment  - controlled. Plan   - continue metformin. Weight reduction can help with T2DM as well. Total time spent on encounter: 110 min. Tom Chan MD  Internal Medicine/Obesity Medicine  2/14/2022.

## 2022-02-14 NOTE — PATIENT INSTRUCTIONS
Rules:  · Count every calorie every day  · Limit sweets to one day per month  · Limit chips/crackers/pretzels/nuts/popcorn to 100 martir/day  · Eliminate all sugar sweetened beverages (including fruit juice)  · Limit restaurants (including fast food and food from a convenience store) to one time every two weeks while in town    Requirements:  · Make sure protein intake is at least 75 grams per day (do not count protein every day; instead spot check your intake every 2-3 weeks and make sure what you think you are getting is close to accurate; consider using a protein shake if needed; these are in the pharmacy section of the stores, not the grocery section; Premier, Pure Protein and Fairlife are relatively inexpensive and taste good to most patients; other options are Nectar, Boost Max, Ensure Max, BeneProtein and GNC lean (which is lactose-free); Nectar fruit, Premier Protein Clear, IsoPure Protein Drink, and Protein 2 O are water-based options; Quest (or Cosco, which is cheaper and is ordered on [employer]) and the [employer] protein bars can also be used, but have less protein in them )  (Disclaimer: Dietary supplements rarely have their listed ingredients and the amount of each verified by a third party other. Sometimes they give verification for their claims to be GMO and gluten free and to be organic. However, even such verifications as these may still be untrustworthy.) (<200 martir, >25 g protein)    · Make sure that fiber intake is at least 22 grams per day. Do this by either eating 12 tablespoons of the original, plain Fiber One cereal every day or 4 tablespoons of wheat dextrin powder (Benefiber or a generic brand) every day. Work up to this amount slowly by starting with only one-eighth to one-fourth of the target amount and then adding another one-eighth to one-fourth every one or two weeks until reaching the target.     · Take one multivitamin every day    Targets:  · Limit calorie intake to 1400 calories/day  · Walk 30 minutes daily or equivalent (210 min/week)  · Avoid eating 2 hours within bedtime. Tips:  · Do not eat outside of the dining room or the kitchen  · Do not eat while watching TV, videos, working on the computer or using a smart phone  · Do not eat food out of a multi-serving bag or container. · Establish 6 hours of food-free \"time-out\" periods (times you don't eat) each day. No period can be less than 1 hour long. The periods need to be the same every day for days that are the same (for example, workdays would have one set of food free periods and weekends would have another set of days). These six hours are in addition to the two hours before bedtime and the time spent sleeping. Low calorie non-starchy vegetables:  Food (per 100 g) Calories Fibers T. Carbohydrates Protein Fat Sodium (mg) Potassium (mg)   Green Bell Peppers 10 1.7 4.6 0.9 0.2 3 175   Cucumbers 15 0.5 3.6 0.7 0.1 2 147   Celery 16 1.6 3 0.7 0.2 80 260   Tomatoes 18 1.2 3.9 0.9 0.2 5 237   Carrots 41 2.8 10 0.9 0.2 69 320   Cabbage 25 2.5 6 1.3 0.1 18 170   Broccoli 35 3.3 7.2 2.4 0.4 41 293   Cauliflower 23 2.3 4.1 1.8 0.5 15 142   Iceberg Lettuce 14 1.2 3 0.9 0.1 10 141   Kale 28 2 5.6 1.9 0.4 23 228   Brussel Sprouts 43 3.8 9 3.4 0.3 25 389   Spinach 23 2.4 3.8 3 0.3 70 466   Zucchini 15 1 2.7 1.1 0.4 3 264   Mushroom 28 2.2 5.3 2.2 0.5 2 356   Asparagus 22 2 4.1 2.4 0.2 14 224   Green Beans 35 3.2 7.9 1.9 0.3 1 146   Eggplant 35 2.5 8.7 0.8 0.2 1 123   Peas 84 5.5 16 5.4 0.2 3 271   Soybeans 172 6 8.4 18 9 1 515   Potatoes 93 2 21 3 0 10 540     Return to see me in 4 weeks.

## 2022-02-18 LAB
Lab: NORMAL
REPORT: NORMAL
THIS TEST SENT TO: NORMAL

## 2022-03-03 DIAGNOSIS — M17.0 PRIMARY OSTEOARTHRITIS OF BOTH KNEES: ICD-10-CM

## 2022-03-03 RX ORDER — TRAMADOL HYDROCHLORIDE 50 MG/1
50 TABLET ORAL EVERY 8 HOURS PRN
Qty: 90 TABLET | Refills: 0 | OUTPATIENT
Start: 2022-03-03 | End: 2022-04-02

## 2022-03-06 DIAGNOSIS — M17.0 PRIMARY OSTEOARTHRITIS OF BOTH KNEES: ICD-10-CM

## 2022-03-07 RX ORDER — TRAMADOL HYDROCHLORIDE 50 MG/1
TABLET ORAL
Qty: 90 TABLET | Refills: 0 | OUTPATIENT
Start: 2022-03-07

## 2022-03-10 ENCOUNTER — TELEPHONE (OUTPATIENT)
Dept: PRIMARY CARE CLINIC | Age: 52
End: 2022-03-10

## 2022-03-10 NOTE — TELEPHONE ENCOUNTER
Pt calling in upset that her Tramadol was not RF. I advised her that it was refused due to she needed an appt. Her last appt was in Nov and she was advised at that appt that she would need seen in 3months. Pt became very angry swearing at me and stating  has cost her 3k for ordering a test on her that she did not need to show she had diabetic neuropathy. She stated  lied to her and promised that he would help her and he has not and all he wants to do is take her money so she is \"firing him\". Pt called back in asking to speak to supervisor. The call was transferred to me and I did advise the pt I was the one who spoke to her in initial conversation.  She began yelling again about how she feels the Dr is wrong and how she already found a new Dr.

## 2022-03-14 ENCOUNTER — OFFICE VISIT (OUTPATIENT)
Dept: BARIATRICS/WEIGHT MGMT | Age: 52
End: 2022-03-14
Payer: COMMERCIAL

## 2022-03-14 VITALS
SYSTOLIC BLOOD PRESSURE: 126 MMHG | WEIGHT: 293 LBS | DIASTOLIC BLOOD PRESSURE: 72 MMHG | HEART RATE: 76 BPM | TEMPERATURE: 97 F | BODY MASS INDEX: 43.4 KG/M2 | HEIGHT: 69 IN

## 2022-03-14 DIAGNOSIS — G47.33 OSA (OBSTRUCTIVE SLEEP APNEA): ICD-10-CM

## 2022-03-14 DIAGNOSIS — G89.29 CHRONIC PAIN OF BOTH KNEES: ICD-10-CM

## 2022-03-14 DIAGNOSIS — M25.562 CHRONIC PAIN OF BOTH KNEES: ICD-10-CM

## 2022-03-14 DIAGNOSIS — E66.01 CLASS 3 SEVERE OBESITY DUE TO EXCESS CALORIES WITH SERIOUS COMORBIDITY AND BODY MASS INDEX (BMI) OF 50.0 TO 59.9 IN ADULT (HCC): ICD-10-CM

## 2022-03-14 DIAGNOSIS — E11.42 DIABETIC PERIPHERAL NEUROPATHY ASSOCIATED WITH TYPE 2 DIABETES MELLITUS (HCC): Primary | ICD-10-CM

## 2022-03-14 DIAGNOSIS — M25.561 CHRONIC PAIN OF BOTH KNEES: ICD-10-CM

## 2022-03-14 PROCEDURE — 99211 OFF/OP EST MAY X REQ PHY/QHP: CPT

## 2022-03-14 PROCEDURE — 99214 OFFICE O/P EST MOD 30 MIN: CPT | Performed by: INTERNAL MEDICINE

## 2022-03-14 RX ORDER — GABAPENTIN 100 MG/1
100 CAPSULE ORAL NIGHTLY
Qty: 90 CAPSULE | Refills: 1 | Status: SHIPPED
Start: 2022-03-14 | End: 2022-06-06 | Stop reason: SDUPTHER

## 2022-03-14 NOTE — PROGRESS NOTES
CC -   Follow up of: Chronic knee pain - arthritis - needs BMI <42 for TKA, weight gain. BACKGROUND -   Last visit: 2/14/22  First visit: 2/14/22     Obesity (all weight in lbs)  Began early adulthood  Initial BMI 55.50, Wt 375.8, Ht 69\"  HS Grad wt 230   Lowest   wt 230   Highest  wt 380 (2021 June)  Pattern of wt gain: gradual  Wt change past yr: -5 lbs  Most wt lost: 40 lbs  Other diets attempted: [employer]. Did a lot of exercise when young    Desire to lose weight: 8/10  Problem posed by appetite: 4-5/10    Initial Diet:    Number of meals per day - 3    Number of snacks per day - 0-1 (about once a week)    Meal volume - 12\" plate,  occasional seconds    Fast food/convenience store - 0-1x/week    Restaurants (not fast food) - 0x/week (included in fast food)   Sweets - 3d/week (pastries, pumpkin bread, fruity chewy)   Chips - 0-1d/week   Crackers/pretzels - 0-1d/week   Nuts - 0d/week (diverticulitis)   Peanut Butter - 1d/week   Popcorn - 0d/week   Dried fruit - 0d/week   Whole fruit - 7d/week (bananas, apples, clementines, pineapples grapes)   Breakfast cereal - 0-1d/week   Granola/Protein/Energy bar - 0d/week   Sugar sweetened beverages - sweet tea ([place] 8-16 oz/day). Coffee with creamer (75 Eric/tbsp) - 3-4 tbsp/cup 3-4x/wk + 1 tbsp sugar. No soda/pops. Protein - No supplements   Fiber - No supplements (used to have fiber one bars)    Breakfast: cottage cheese with cherry tomatoes etc, or toast with peanut butter or cheese etc.  Lunch: TV dinner (pot pie, chinese etc), or yogurt + slice of cheese etc.  Dinner: varies, baked potatoes, roast and broccoli. Had honey fried chicken breast, 2 eggs, country fried potatoes served with biscuit (half of it). Initial Exercise:    Gym membership - no    Walking - no    Running - no    Resistance - no    Aerobic class - no. Has total body vibration machine.         Initial Sleep: Bedtime: 10-midnight, wake up time: 7-8 am - usually rested with CPAP + o2 2l/min, daytime naps: no.    Weight scale at home: yes, takes weight: daily  Food scale: yes (but unsure where it is)    Follow up 3/14/22:  1. Fatigue: Some tiredness today, but otherwise doing well. 2. Diet: Following low calorie diet plan, trying to keep up with protein and fiber intake    3. Exercise: around the house, planning to do more as it will get warmer. 4. Sleep: still using CPAP and o2, no daytime naps. 5. Medications: Not taking Tramadol currently, but otherwise    ______________________    [facility] -  Past Medical History:   Diagnosis Date    Chronic pain of both knees     Class 3 severe obesity due to excess calories with serious comorbidity and body mass index (BMI) of 50.0 to 59.9 in [facility])     Diverticulitis     Hypothyroidism     Mild intermittent asthma without complication     Morbid obesity due to excess calories (HCC)     MELCHOR (obstructive sleep apnea)     [facility]'s disease [facility]     Type 2 diabetes mellitus with autonomic neuropathy ([facility]    Unclear if patient has Sjogren's disease, workup in progress. Past Surgical History:   Procedure Laterality Date    ANKLE SURGERY Bilateral      SECTION      CYST REMOVAL      Chest    HERNIA REPAIR      Umbilical    KIDNEY STONE REMOVAL       Prior to Admission medications    Medication Sig Start Date End Date Taking?  Authorizing Provider   levothyroxine (SYNTHROID) 75 MCG tablet Take 1 tablet by mouth Daily 22   Connie Sheehan, DO   metFORMIN (GLUCOPHAGE) 500 MG tablet Take 1 tablet by mouth 2 times daily (with meals) 22   Michelle Morales, DO   spironolactone (ALDACTONE) 50 MG tablet Take 1 tablet by mouth daily 22   Michelle Morales, DO   celecoxib (CELEBREX) 200 MG capsule Take 1 capsule by mouth 2 times daily as needed for Pain 22   Michelle Morales, DO   Handicap Placard MISC by Does not apply route DURATION 5 YEARS 21   Connie Sheehan, DO   budesonide-formoterol (SYMBICORT) 80-4.5 MCG/ACT AERO Inhale 2 puffs into the lungs daily    Historical Provider, MD   CINNAMON PO Take by mouth    Historical Provider, MD   TURMERIC PO Take by mouth    Historical Provider, MD   Calcium Citrate-Vitamin D (CALCIUM + D PO) Take by mouth    Historical Provider, MD   Ferrous Sulfate (IRON PO) Take by mouth    Historical Provider, MD   b complex vitamins capsule Take 1 capsule by mouth daily    Historical Provider, MD   ZINC PO Take by mouth    Historical Provider, MD   Ascorbic Acid (VITAMIN C PO) Take by mouth    Historical Provider, MD   Omega-3 Fatty Acids (FISH OIL PO) Take by mouth    Historical Provider, MD   PARDO PO Take by mouth    Historical Provider, MD     Allergies: No Known Allergies, environmental allergies. Family history: DM: mother, Heart disease: father (passed away with MI at 43 yrs of age), father side. Social history: smoking: never ; Alcohol: no , work: sedentary work. 3 steps and has difficulty climbing up and down. ROS -  Card - no CP. GI - no N/V, has loose BM (watery, unkown why but had workup) once to twice a day. PE -  Gen : /72 (Site: Left Upper Arm, Position: Sitting, Cuff Size: Thigh)   Pulse 76   Temp 97 °F (36.1 °C) (Temporal)   Ht 5' 9\" (1.753 m)   Wt (!) 360 lb 6.4 oz (163.5 kg)   BMI 53.22 kg/m²    WN, WD, NAD  Lung: Nml resp effort, CTA B/L at present  Heart:  RRR w/o MGR, .+ LE pitting edema b/l  Psych: Normal mood   Full affect  Neuro: Moves all ext well  ______________________    HISTORY & ASSESSMENT/PLAN -     Problem 1  - Bilateral knee pain   HPI   - Ongoing, gradually progressing which pt feels like due to weight gain. Was told it is bone on bone, will need BMI<42 for surgery (<284 lbs)  Assessment  - Uncontrolled, weight gain is likely primary contributor   Plan   - currently is on Celebrex and tramadol prn that seems to be helping. Weight reduction can help not only with pain but will help her undergo surgery as well.  Weight reduction per plan below    Problem 2  - Obesity   HPI   - See above Background for description    Weight  Date    375.8 lbs 2/14/22    360.4  3/14/22  Total weight change to date: -15.4 lbs. Average daily energy variance:  2/14/2022 - 3/14/2022: -13.8 lbs (89662 Eric)/27 days = -1789 Eric/day deficit (after correction of 1.6 lbs for water weight and starting the day after last visit). DEN = 2347 Eric/d  Assessment  - Uncontrolled  Plan   - She is doing very well with calorie counting with low calorie diet. Would like to continue the same. Does not want appetite suppressant at this time. Follow up in about 6 weeks. Problem 3  - MELCHOR   HPI   - Doing well with CPAP, used to have tiredness, but better after CPAP  Assessment  - controlled. Plan   - continue CPAP with o2. Weight loss can help with MELCHOR as well. Problem 4  - DM2 with peripheral neuropathy  HPI   - Last hba1c was 7.0, on Metformin, would like to see PCP before checking hba1c again. Does have peripheral neuropathy that bothers her sleep. Assessment  - controlled. Plan   - Continue metformin. Weight reduction can help with T2DM as well. After talking about side effects (that includes possibility of weight gain), prescribed gabapentin nightly to help with peripheral neuropathy. Orders Placed This Encounter   Medications    gabapentin (NEURONTIN) 100 MG capsule     Sig: Take 1 capsule by mouth at bedtime for 180 days. Intended supply: 90 days     Dispense:  90 capsule     Refill:  1       Filomena Osorio MD  Internal Medicine/Obesity Medicine  3/14/2022.

## 2022-03-14 NOTE — PATIENT INSTRUCTIONS
Low calorie non-starchy vegetables:  Food (per 100 g) Calories Fibers T. Carbohydrates Protein Fat Sodium (mg) Potassium (mg)   Green Bell Peppers 10 1.7 4.6 0.9 0.2 3 175   Cucumbers 15 0.5 3.6 0.7 0.1 2 147   Celery 16 1.6 3 0.7 0.2 80 260   Tomatoes 18 1.2 3.9 0.9 0.2 5 237   Carrots 41 2.8 10 0.9 0.2 69 320   Cabbage 25 2.5 6 1.3 0.1 18 170   Broccoli 35 3.3 7.2 2.4 0.4 41 293   Cauliflower 23 2.3 4.1 1.8 0.5 15 142   Iceberg Lettuce 14 1.2 3 0.9 0.1 10 141   Kale 28 2 5.6 1.9 0.4 23 228   Brussel Sprouts 43 3.8 9 3.4 0.3 25 389   Spinach 23 2.4 3.8 3 0.3 70 466   Zucchini 15 1 2.7 1.1 0.4 3 264   Mushroom 28 2.2 5.3 2.2 0.5 2 356   Asparagus 22 2 4.1 2.4 0.2 14 224   Green Beans 35 3.2 7.9 1.9 0.3 1 146   Eggplant 35 2.5 8.7 0.8 0.2 1 123     Gabapentin:  Take 100 mg by mouth nightly. Follow up in about 6 weeks.

## 2022-04-15 ENCOUNTER — OFFICE VISIT (OUTPATIENT)
Dept: FAMILY MEDICINE CLINIC | Age: 52
End: 2022-04-15
Payer: COMMERCIAL

## 2022-04-15 VITALS
BODY MASS INDEX: 51.89 KG/M2 | TEMPERATURE: 97.7 F | WEIGHT: 293 LBS | OXYGEN SATURATION: 96 % | DIASTOLIC BLOOD PRESSURE: 74 MMHG | SYSTOLIC BLOOD PRESSURE: 104 MMHG | HEART RATE: 93 BPM

## 2022-04-15 DIAGNOSIS — E03.9 ACQUIRED HYPOTHYROIDISM: ICD-10-CM

## 2022-04-15 DIAGNOSIS — I10 ESSENTIAL HYPERTENSION: ICD-10-CM

## 2022-04-15 DIAGNOSIS — E11.40 TYPE 2 DIABETES MELLITUS WITH DIABETIC NEUROPATHY, WITHOUT LONG-TERM CURRENT USE OF INSULIN (HCC): Primary | ICD-10-CM

## 2022-04-15 DIAGNOSIS — G89.29 BILATERAL CHRONIC KNEE PAIN: ICD-10-CM

## 2022-04-15 DIAGNOSIS — M25.562 BILATERAL CHRONIC KNEE PAIN: ICD-10-CM

## 2022-04-15 DIAGNOSIS — M25.561 BILATERAL CHRONIC KNEE PAIN: ICD-10-CM

## 2022-04-15 PROBLEM — N20.0 KIDNEY STONE: Status: ACTIVE | Noted: 2022-04-15

## 2022-04-15 PROBLEM — M19.90 OSTEOARTHRITIS: Status: ACTIVE | Noted: 2022-04-15

## 2022-04-15 PROBLEM — K57.92 DIVERTICULITIS: Status: ACTIVE | Noted: 2022-04-15

## 2022-04-15 PROBLEM — E28.2 PCOS (POLYCYSTIC OVARIAN SYNDROME): Status: ACTIVE | Noted: 2022-04-15

## 2022-04-15 PROBLEM — Z86.718 HISTORY OF DVT IN ADULTHOOD: Status: ACTIVE | Noted: 2022-04-15

## 2022-04-15 PROBLEM — C44.311 BASAL CELL CARCINOMA (BCC) OF SKIN OF NOSE: Status: ACTIVE | Noted: 2022-04-15

## 2022-04-15 PROBLEM — E11.42 TYPE 2 DIABETES MELLITUS WITH DIABETIC POLYNEUROPATHY, WITHOUT LONG-TERM CURRENT USE OF INSULIN (HCC): Status: ACTIVE | Noted: 2021-09-29

## 2022-04-15 LAB — HBA1C MFR BLD: 5.9 %

## 2022-04-15 PROCEDURE — 3017F COLORECTAL CA SCREEN DOC REV: CPT | Performed by: FAMILY MEDICINE

## 2022-04-15 PROCEDURE — 83036 HEMOGLOBIN GLYCOSYLATED A1C: CPT | Performed by: FAMILY MEDICINE

## 2022-04-15 PROCEDURE — G8417 CALC BMI ABV UP PARAM F/U: HCPCS | Performed by: FAMILY MEDICINE

## 2022-04-15 PROCEDURE — G8427 DOCREV CUR MEDS BY ELIG CLIN: HCPCS | Performed by: FAMILY MEDICINE

## 2022-04-15 PROCEDURE — 1036F TOBACCO NON-USER: CPT | Performed by: FAMILY MEDICINE

## 2022-04-15 PROCEDURE — 3044F HG A1C LEVEL LT 7.0%: CPT | Performed by: FAMILY MEDICINE

## 2022-04-15 PROCEDURE — 2022F DILAT RTA XM EVC RTNOPTHY: CPT | Performed by: FAMILY MEDICINE

## 2022-04-15 PROCEDURE — 99214 OFFICE O/P EST MOD 30 MIN: CPT | Performed by: FAMILY MEDICINE

## 2022-04-15 RX ORDER — TRAMADOL HYDROCHLORIDE 50 MG/1
50 TABLET ORAL EVERY 6 HOURS PRN
COMMUNITY
End: 2022-04-15 | Stop reason: SDUPTHER

## 2022-04-15 RX ORDER — TRAMADOL HYDROCHLORIDE 50 MG/1
50 TABLET ORAL EVERY 8 HOURS PRN
Qty: 90 TABLET | Refills: 2 | Status: SHIPPED
Start: 2022-04-15 | End: 2022-07-08

## 2022-04-15 SDOH — ECONOMIC STABILITY: FOOD INSECURITY: WITHIN THE PAST 12 MONTHS, YOU WORRIED THAT YOUR FOOD WOULD RUN OUT BEFORE YOU GOT MONEY TO BUY MORE.: SOMETIMES TRUE

## 2022-04-15 SDOH — ECONOMIC STABILITY: FOOD INSECURITY: WITHIN THE PAST 12 MONTHS, THE FOOD YOU BOUGHT JUST DIDN'T LAST AND YOU DIDN'T HAVE MONEY TO GET MORE.: SOMETIMES TRUE

## 2022-04-15 ASSESSMENT — ENCOUNTER SYMPTOMS
COUGH: 0
VOMITING: 0
DIARRHEA: 0
ABDOMINAL PAIN: 0
SHORTNESS OF BREATH: 0
EYE PAIN: 0
BLOOD IN STOOL: 0
WHEEZING: 0
CONSTIPATION: 0
NAUSEA: 0

## 2022-04-15 ASSESSMENT — SOCIAL DETERMINANTS OF HEALTH (SDOH): HOW HARD IS IT FOR YOU TO PAY FOR THE VERY BASICS LIKE FOOD, HOUSING, MEDICAL CARE, AND HEATING?: SOMEWHAT HARD

## 2022-04-15 NOTE — PROGRESS NOTES
4/15/2022    Tk King is a 46 y.o. female here for:    Chief Complaint   Patient presents with    Established New Doctor    Diabetes    Hypertension    Hypothyroidism        HPI:  T2DM  - On metformin 500 mg BID. Reports compliance with medication. Denies side effects of medication.  - Does not check BG readings at home. - Last eye exam: 1 month ago  - Last foot exam: due   - Denies double vision, blurry vision, eye pain, polydipsia, and polyuria. - Admits to neuropathy. On gabapentin 100 mg HS. Gabapentin is controlling her neuropathy well. - Not on a statin currently. Not on an ACE inhibitor. The 10-year ASCVD risk score (Edna Cha., et al., 2013) is: 2%    Values used to calculate the score:      Age: 46 years      Sex: Female      Is Non- : No      Diabetic: Yes      Tobacco smoker: No      Systolic Blood Pressure: 582 mmHg      Is BP treated: Yes      HDL Cholesterol: 49 mg/dL      Total Cholesterol: 134 mg/dL    HTN/ PCOS  - On spironolactone 50 mg QD. Reports compliance with medication. Denies side effects of medication.   - Denies chest pain, palpitations, lightheadedness, dizziness, and syncope. Hypothyroidism   - On Synthroid 75 mcg QD. Reports compliance with medication. Denies side effects of medication.   - Denies constipation and hair loss. Chronic knee pain   - Started at age 13   - Has failed corticosteroid injections while living in [address] from 09/2021 show bilateral osteoarthritis   - Was referred by prior PCP to [facility]. Saw Dr. Osmar Velasquez. Dr. Osmar Velasquez told patient that she does need bilateral knee replacements, but she needs to lose weight first. Patient is [facility]   - On Celebrex 200 mg BID PRN and tramadol 50 mg TID PRN.        Current Outpatient Medications   Medication Sig Dispense Refill    metFORMIN (GLUCOPHAGE) 500 MG tablet Take 1 tablet by mouth daily (with breakfast) 90 tablet 0    traMADol (ULTRAM) 50 MG tablet Take 1 tablet by mouth every 8 hours as needed for Pain for up to 90 days. 90 tablet 2    gabapentin (NEURONTIN) 100 MG capsule Take 1 capsule by mouth at bedtime for 180 days. Intended supply: 90 days 90 capsule 1    levothyroxine (SYNTHROID) 75 MCG tablet Take 1 tablet by mouth Daily 90 tablet 2    spironolactone (ALDACTONE) 50 MG tablet Take 1 tablet by mouth daily 90 tablet 2    celecoxib (CELEBREX) 200 MG capsule Take 1 capsule by mouth 2 times daily as needed for Pain 180 capsule 2    Handicap Placard MISC by Does not apply route DURATION 5 YEARS 1 each 0    CINNAMON PO Take by mouth      TURMERIC PO Take by mouth      Calcium Citrate-Vitamin D (CALCIUM + D PO) Take by mouth      Ferrous Sulfate (IRON PO) Take by mouth      b complex vitamins capsule Take 1 capsule by mouth daily      ZINC PO Take by mouth      Ascorbic Acid (VITAMIN C PO) Take by mouth      Omega-3 Fatty Acids (FISH OIL PO) Take by mouth       No current facility-administered medications for this visit.       No Known Allergies    Past Medical & Surgical History:      Diagnosis Date    Basal cell carcinoma (BCC) of skin of nose     Chronic pain of both knees     Class 3 severe obesity due to excess calories with serious comorbidity and body mass index (BMI) of 50.0 to 59.9 in [facility])     Diverticulitis     Essential hypertension     History of DVT in adulthood     superficial, right lower extremity, provoked by surgery    Hypothyroidism     Kidney stone     Mild intermittent asthma without complication     MELCHOR (obstructive sleep apnea)     on CPAP, does not follow with Sleep Medicine since moving to [address] [place] [address] PCOS (polycystic ovarian syndrome)     Type 2 diabetes mellitus with diabetic polyneuropathy, without long-term current use of insulin ([facility] [facility]      Past Surgical History:   Procedure Laterality Date    ANKLE SURGERY Bilateral      SECTION N/A     x 1    CYST REMOVAL N/A     chest    KIDNEY STONE REMOVAL      UMBILICAL HERNIA REPAIR N/A     x 2       Family History:      Problem Relation Age of Onset    Thyroid Disease Mother     High Blood Pressure Mother     Diabetes Mother     Depression Mother     Osteoporosis Mother     Obesity Mother    [place] Stroke Mother     Glaucoma Mother     Heart Attack Father 43    Drug Abuse Sister         methamphetamine    Heart Disease Brother     Gout Brother     Diabetes Maternal Grandmother     Alcohol Abuse Maternal Grandfather     Lung Cancer Maternal Grandfather     Stroke Paternal Grandmother     Heart Attack Paternal Grandfather     Pancreatic Cancer Maternal Uncle     Prostate Cancer Paternal Uncle        Social History:  Social History     Tobacco Use    Smoking status: Never Smoker    Smokeless tobacco: Never Used   Substance Use Topics    Alcohol use: Not Currently     Comment: rare       Review of Systems   Constitutional: Negative for chills and fever. Eyes: Negative for pain and visual disturbance. Respiratory: Negative for cough, shortness of breath and wheezing. Cardiovascular: Negative for chest pain, palpitations and leg swelling. Gastrointestinal: Negative for abdominal pain, blood in stool, constipation, diarrhea, nausea and vomiting. Endocrine: Negative for polydipsia and polyuria. Neurological: Positive for numbness. Negative for dizziness, syncope and light-headedness. BP Readings from Last 3 Encounters:   04/15/22 104/74   03/14/22 126/72   02/14/22 128/72       VS:  /74 (Site: Left Upper Arm, Position: Sitting, Cuff Size: Large Adult)   Pulse 93   Temp 97.7 °F (36.5 °C)   Wt (!) 351 lb 6.4 oz (159.4 kg)   SpO2 96%   BMI 51.89 kg/m²     Physical Exam  Vitals reviewed. Constitutional:       General: She is awake. She is not in acute distress. Appearance: She is well-developed and well-groomed. She is morbidly obese.  She is not ill-appearing, toxic-appearing or diaphoretic. Neck:      Vascular: No carotid bruit. Cardiovascular:      Rate and Rhythm: Normal rate and regular rhythm. Heart sounds: S1 normal and S2 normal. No murmur heard. Pulmonary:      Breath sounds: No decreased breath sounds, wheezing, rhonchi or rales. Abdominal:      General: Bowel sounds are normal. There is no distension. Palpations: Abdomen is soft. Tenderness: There is no abdominal tenderness. There is no guarding or rebound. Musculoskeletal:      Cervical back: Neck supple. Right lower leg: No tenderness. No edema. Left lower leg: No tenderness. No edema. Skin:     General: Skin is warm and dry. Neurological:      General: No focal deficit present. Mental Status: She is alert. Psychiatric:         Attention and Perception: Attention normal.         Mood and Affect: Mood normal.         Speech: Speech normal.         Behavior: Behavior normal. Behavior is cooperative. Thought Content: Thought content normal.         Cognition and Memory: Cognition normal.         Judgment: Judgment normal.         Assessment/Plan:  1. Type 2 diabetes mellitus with diabetic neuropathy, without long-term current use of insulin ([address]  Well-controlled. POC HgbA1c= 5.9% today. Will decrease metformin 500 mg BID to 500 mg QD. Check labs today. Will need to discuss with patient at next appointment about initiating statin therapy. Follow-up in 3 months.   - POCT glycosylated hemoglobin (Hb A1C)  - metFORMIN (GLUCOPHAGE) 500 MG tablet; Take 1 tablet by mouth daily (with breakfast)  Dispense: 90 tablet; Refill: 0  - LIPID PANEL; Future  - MICROALBUMIN / CREATININE URINE RATIO; Future  - Hemoglobin A1C; Future    2. Essential hypertension  Well-controlled. Continue spironolactone 50 mg QD. Check labs today. Will need to discuss with patient about starting ACE inhibitor or ARB at next appointment due to T2DM. Follow-up in 3 months.    - CBC with Auto Differential; Future  - Comprehensive Metabolic Panel; Future  - CBC with Auto Differential; Future  - Comprehensive Metabolic Panel; Future    3. Acquired hypothyroidism  Stable. Check TSH today. Continue Synthroid 75 mcg QD. Follow-up in 3 months.   - TSH; Future  - TSH; Future    4. Bilateral chronic knee pain  Stable. Continue tramadol 50 mg q 8 hours PRN. OARRS reviewed today. Follow-up in 3 months. - traMADol (ULTRAM) 50 MG tablet; Take 1 tablet by mouth every 8 hours as needed for Pain for up to 90 days. Dispense: 90 tablet; Refill: 2        Return in about 3 months (around 7/15/2022) for 3 month med check.       Emma Connelly,   Family Medicine

## 2022-04-16 ENCOUNTER — HOSPITAL ENCOUNTER (OUTPATIENT)
Age: 52
Discharge: HOME | End: 2022-04-16
Payer: COMMERCIAL

## 2022-04-16 DIAGNOSIS — I10 ESSENTIAL HYPERTENSION: ICD-10-CM

## 2022-04-16 DIAGNOSIS — E11.40 TYPE 2 DIABETES MELLITUS WITH DIABETIC NEUROPATHY, WITHOUT LONG-TERM CURRENT USE OF INSULIN (HCC): ICD-10-CM

## 2022-04-16 DIAGNOSIS — E03.9 ACQUIRED HYPOTHYROIDISM: ICD-10-CM

## 2022-04-16 LAB
ALBUMIN SERPL-MCNC: 4.4 G/DL (ref 3.5–5.2)
ALP BLD-CCNC: 56 U/L (ref 35–104)
ALT SERPL-CCNC: 26 U/L (ref 0–32)
ANION GAP SERPL CALCULATED.3IONS-SCNC: 12 MMOL/L (ref 7–16)
AST SERPL-CCNC: 20 U/L (ref 0–31)
BASOPHILS ABSOLUTE: 0.03 E9/L (ref 0–0.2)
BASOPHILS RELATIVE PERCENT: 0.4 % (ref 0–2)
BILIRUB SERPL-MCNC: 0.9 MG/DL (ref 0–1.2)
BUN BLDV-MCNC: 16 MG/DL (ref 6–20)
CALCIUM SERPL-MCNC: 9.9 MG/DL (ref 8.6–10.2)
CHLORIDE BLD-SCNC: 100 MMOL/L (ref 98–107)
CHOLESTEROL, TOTAL: 117 MG/DL (ref 0–199)
CO2: 28 MMOL/L (ref 22–29)
CREAT SERPL-MCNC: 0.9 MG/DL (ref 0.5–1)
EOSINOPHILS ABSOLUTE: 0.09 E9/L (ref 0.05–0.5)
EOSINOPHILS RELATIVE PERCENT: 1.3 % (ref 0–6)
GFR AFRICAN AMERICAN: >60
GFR NON-AFRICAN AMERICAN: >60 ML/MIN/1.73
GLUCOSE BLD-MCNC: 116 MG/DL (ref 74–99)
HCT VFR BLD CALC: 39.1 % (ref 34–48)
HDLC SERPL-MCNC: 46 MG/DL
HEMOGLOBIN: 13.3 G/DL (ref 11.5–15.5)
IMMATURE GRANULOCYTES #: 0.02 E9/L
IMMATURE GRANULOCYTES %: 0.3 % (ref 0–5)
LDL CHOLESTEROL CALCULATED: 56 MG/DL (ref 0–99)
LYMPHOCYTES ABSOLUTE: 1.83 E9/L (ref 1.5–4)
LYMPHOCYTES RELATIVE PERCENT: 25.6 % (ref 20–42)
MCH RBC QN AUTO: 29.4 PG (ref 26–35)
MCHC RBC AUTO-ENTMCNC: 34 % (ref 32–34.5)
MCV RBC AUTO: 86.3 FL (ref 80–99.9)
MONOCYTES ABSOLUTE: 0.4 E9/L (ref 0.1–0.95)
MONOCYTES RELATIVE PERCENT: 5.6 % (ref 2–12)
NEUTROPHILS ABSOLUTE: 4.77 E9/L (ref 1.8–7.3)
NEUTROPHILS RELATIVE PERCENT: 66.8 % (ref 43–80)
PDW BLD-RTO: 13.2 FL (ref 11.5–15)
PLATELET # BLD: 241 E9/L (ref 130–450)
PMV BLD AUTO: 10.7 FL (ref 7–12)
POTASSIUM SERPL-SCNC: 4.4 MMOL/L (ref 3.5–5)
RBC # BLD: 4.53 E12/L (ref 3.5–5.5)
SODIUM BLD-SCNC: 140 MMOL/L (ref 132–146)
TOTAL PROTEIN: 7.9 G/DL (ref 6.4–8.3)
TRIGL SERPL-MCNC: 74 MG/DL (ref 0–149)
TSH SERPL DL<=0.05 MIU/L-ACNC: 1.98 UIU/ML (ref 0.27–4.2)
VLDLC SERPL CALC-MCNC: 15 MG/DL
WBC # BLD: 7.1 E9/L (ref 4.5–11.5)

## 2022-04-16 PROCEDURE — 80053 COMPREHEN METABOLIC PANEL: CPT

## 2022-04-16 PROCEDURE — 85025 COMPLETE CBC W/AUTO DIFF WBC: CPT

## 2022-04-16 PROCEDURE — 80061 LIPID PANEL: CPT

## 2022-04-16 PROCEDURE — 36415 COLL VENOUS BLD VENIPUNCTURE: CPT

## 2022-04-16 PROCEDURE — 82044 UR ALBUMIN SEMIQUANTITATIVE: CPT

## 2022-04-16 PROCEDURE — 82570 ASSAY OF URINE CREATININE: CPT

## 2022-04-16 PROCEDURE — 84443 ASSAY THYROID STIM HORMONE: CPT

## 2022-04-17 LAB
CREATININE URINE: 186 MG/DL (ref 29–226)
MICROALBUMIN UR-MCNC: <12 MG/L
MICROALBUMIN/CREAT UR-RTO: ABNORMAL (ref 0–30)

## 2022-04-25 ENCOUNTER — OFFICE VISIT (OUTPATIENT)
Dept: BARIATRICS/WEIGHT MGMT | Age: 52
End: 2022-04-25
Payer: COMMERCIAL

## 2022-04-25 VITALS
BODY MASS INDEX: 43.4 KG/M2 | SYSTOLIC BLOOD PRESSURE: 113 MMHG | HEIGHT: 69 IN | WEIGHT: 293 LBS | DIASTOLIC BLOOD PRESSURE: 67 MMHG | HEART RATE: 72 BPM | TEMPERATURE: 97.4 F

## 2022-04-25 DIAGNOSIS — E11.42 TYPE 2 DIABETES MELLITUS WITH DIABETIC POLYNEUROPATHY, WITHOUT LONG-TERM CURRENT USE OF INSULIN (HCC): ICD-10-CM

## 2022-04-25 DIAGNOSIS — M25.562 CHRONIC PAIN OF BOTH KNEES: Primary | ICD-10-CM

## 2022-04-25 DIAGNOSIS — G89.29 CHRONIC PAIN OF BOTH KNEES: Primary | ICD-10-CM

## 2022-04-25 DIAGNOSIS — M25.561 CHRONIC PAIN OF BOTH KNEES: Primary | ICD-10-CM

## 2022-04-25 DIAGNOSIS — E66.01 CLASS 3 SEVERE OBESITY DUE TO EXCESS CALORIES WITH SERIOUS COMORBIDITY AND BODY MASS INDEX (BMI) OF 50.0 TO 59.9 IN ADULT (HCC): ICD-10-CM

## 2022-04-25 DIAGNOSIS — G47.33 OSA (OBSTRUCTIVE SLEEP APNEA): ICD-10-CM

## 2022-04-25 PROCEDURE — 99211 OFF/OP EST MAY X REQ PHY/QHP: CPT

## 2022-04-25 PROCEDURE — 99214 OFFICE O/P EST MOD 30 MIN: CPT | Performed by: INTERNAL MEDICINE

## 2022-04-25 PROCEDURE — 3044F HG A1C LEVEL LT 7.0%: CPT | Performed by: INTERNAL MEDICINE

## 2022-04-25 NOTE — PATIENT INSTRUCTIONS
Protein: >=75 gm/day. Fiber: >=25 gm/day. Water: >=96 oz/day. Can increase Gabapentin to 200 mg at bedtime. Follow up in 6 weeks.

## 2022-04-25 NOTE — PROGRESS NOTES
CC -   Follow up of: Chronic knee pain - arthritis - needs BMI <42 for TKA, weight gain. BACKGROUND -   Last visit: 3/14/22  First visit: 2/14/22     Obesity (all weight in lbs)  Began early adulthood  Initial BMI 55.50, Wt 375.8, Ht 69\"  HS Grad wt 230   Lowest   wt 230   Highest  wt 380 (2021 June)  Pattern of wt gain: gradual  Wt change past yr: -5 lbs  Most wt lost: 40 lbs  Other diets attempted: [employer]. Did a lot of exercise when young    Desire to lose weight: 8/10  Problem posed by appetite: 4-5/10    Initial Diet:    Number of meals per day - 3    Number of snacks per day - 0-1 (about once a week)    Meal volume - 12\" plate,  occasional seconds    Fast food/convenience store - 0-1x/week    Restaurants (not fast food) - 0x/week (included in fast food)   Sweets - 3d/week (pastries, pumpkin bread, fruity chewy)   Chips - 0-1d/week   Crackers/pretzels - 0-1d/week   Nuts - 0d/week (diverticulitis)   Peanut Butter - 1d/week   Popcorn - 0d/week   Dried fruit - 0d/week   Whole fruit - 7d/week (bananas, apples, clementines, pineapples grapes)   Breakfast cereal - 0-1d/week   Granola/Protein/Energy bar - 0d/week   Sugar sweetened beverages - sweet tea ([place] 8-16 oz/day). Coffee with creamer (75 Eric/tbsp) - 3-4 tbsp/cup 3-4x/wk + 1 tbsp sugar. No soda/pops. Protein - No supplements   Fiber - No supplements (used to have fiber one bars)    Breakfast: cottage cheese with cherry tomatoes etc, or toast with peanut butter or cheese etc.  Lunch: TV dinner (pot pie, chinese etc), or yogurt + slice of cheese etc.  Dinner: varies, baked potatoes, roast and broccoli. Had honey fried chicken breast, 2 eggs, country fried potatoes served with biscuit (half of it). Initial Exercise:    Gym membership - no    Walking - no    Running - no    Resistance - no    Aerobic class - no. Has total body vibration machine.         Initial Sleep: Bedtime: 10-midnight, wake up time: 7-8 am - usually rested with CPAP + o2 2l/min, daytime naps: no.    Weight scale at home: yes, takes weight: daily  Food scale: yes (but unsure where it is)    Follow up 3/14/22:  1. Fatigue: Some tiredness today, but otherwise doing well. 2. Diet: Following low calorie diet plan, trying to keep up with protein and fiber intake    3. Exercise: around the house, planning to do more as it will get warmer. 4. Sleep: still using CPAP and o2, no daytime naps. 5. Medications: Not taking Tramadol currently, but otherwise    Follow up 22:  1. Fatigue: has some fatigue, d/t late hrs.    2. Diet: Egg substitute for breakfast, beef, turkey or chicken - for protein. Benefiber for fiber, cirkul with water flavor. 3. Exercise: increased activity (household)    4. Sleep: late hrs, but using cpap. No daytime naps. 5. Medications: No needing medication for appetite. On Metformin daily. ______________________    [facility] -  Past Medical History:   Diagnosis Date    Basal cell carcinoma (BCC) of skin of nose     Chronic pain of both knees     Class 3 severe obesity due to excess calories with serious comorbidity and body mass index (BMI) of 50.0 to 59.9 in adult [facility])     Diverticulitis     Essential hypertension     History of DVT in adulthood     superficial, right lower extremity, provoked by surgery    Hypothyroidism     Kidney stone     Mild intermittent asthma without complication     MELCHOR (obstructive sleep apnea)     on CPAP, does not follow with Sleep Medicine since moving to [address] [place] [address] PCOS (polycystic ovarian syndrome)     Type 2 diabetes mellitus with diabetic polyneuropathy, without long-term current use of insulin ([facility] [address]    Unclear if patient has Sjogren's disease, workup in progress.      Past Surgical History:   Procedure Laterality Date    ANKLE SURGERY Bilateral      SECTION N/A     x 1    CYST REMOVAL N/A     chest    KIDNEY STONE REMOVAL      UMBILICAL HERNIA REPAIR N/A     x 2     Prior to Admission medications    Medication Sig Start Date End Date Taking? Authorizing Provider   metFORMIN (GLUCOPHAGE) 500 MG tablet Take 1 tablet by mouth daily (with breakfast) 4/15/22   Deneen Mccollum DO   traMADol (ULTRAM) 50 MG tablet Take 1 tablet by mouth every 8 hours as needed for Pain for up to 90 days. 4/15/22 7/14/22  [facility] DO Chun   gabapentin (NEURONTIN) 100 MG capsule Take 1 capsule by mouth at bedtime for 180 days. Intended supply: 90 days 3/14/22 9/10/22  Michelle Mueller MD   levothyroxine (SYNTHROID) 75 MCG tablet Take 1 tablet by mouth Daily 2/1/22   Yodit Sheehan DO   spironolactone (ALDACTONE) 50 MG tablet Take 1 tablet by mouth daily 2/1/22   Ashby Severance, DO   celecoxib (CELEBREX) 200 MG capsule Take 1 capsule by mouth 2 times daily as needed for Pain 2/1/22   Ashby Severance, DO   Handicap Placard MISC by Does not apply route DURATION 5 YEARS 11/23/21   Ashby Severance,    CINNAMON PO Take by mouth    Historical Provider, MD   TURMERIC PO Take by mouth    Historical Provider, MD   Calcium Citrate-Vitamin D (CALCIUM + D PO) Take by mouth    Historical Provider, MD   Ferrous Sulfate (IRON PO) Take by mouth    Historical Provider, MD   b complex vitamins capsule Take 1 capsule by mouth daily    Historical Provider, MD   ZINC PO Take by mouth    Historical Provider, MD   Ascorbic Acid (VITAMIN C PO) Take by mouth    Historical Provider, MD   Omega-3 Fatty Acids (FISH OIL PO) Take by mouth    Historical Provider, MD     Allergies: No Known Allergies, environmental allergies. Family history: DM: mother, Heart disease: father (passed away with MI at 43 yrs of age), father side. Social history: smoking: never ; Alcohol: no , work: sedentary work. 3 steps and has difficulty climbing up and down. ROS -  Card - no CP. GI - no N/V, has loose BM (watery, unkown why but had workup) once to twice a day.     PE -  Gen : /67 (Site: Left Upper Arm, Position: Sitting, Cuff Size: Thigh)   Pulse 72   Temp 97.4 °F (36.3 °C) (Temporal)   Ht 5' 9\" (1.753 m)   Wt (!) 347 lb (157.4 kg)   BMI 51.24 kg/m²    WN, WD, NAD  Lung: Nml resp effort, CTA B/L at present  Heart:  RRR w/o MGR, .trace -1+ LE pitting edema b/l  Psych: Normal mood   Full affect  Neuro: Moves all ext well  ______________________    HISTORY & ASSESSMENT/PLAN -     Problem 1  - Bilateral knee pain   HPI   - Ongoing, gradually progressing which pt feels like due to weight gain. Was told it is bone on bone, will need BMI<42 for surgery (<284 lbs)  Assessment  - Uncontrolled, weight gain is likely primary contributor   Plan   - currently is on Celebrex and tramadol prn that seems to be helping. Weight reduction can help not only with pain but will help her undergo surgery as well. Weight reduction per plan below    Problem 2  - Obesity   HPI   - See above Background for description    Weight  Date    375.8 lbs 2/14/22    360.4  3/14/22    347.0  4/25/22    Total weight change to date: -28.8 lbs. Average daily energy variance:   2/14/2022 - 3/14/2022: -13.8 lbs (92592 Eric)/27 days = -1789 Eric/day deficit (after correction of 1.6 lbs for water weight and starting the day after last visit). 3/14/2022 - 4/25/2022: -13.4 lbs (35692 Eric)/41 days = -1144 Eric/day deficit. DEN = 2347 Eric/d  Assessment  - Uncontrolled  Plan   - She is doing well with calorie counting and low calorie diet. Not needing an appetite suppressant currently. Feels she needs gabapentin dose increased for neuropathy, and talked about increasing to 200 mg at bedtime. Continue current otherwise. Planned as follows:  Patient Instructions   Protein: >=75 gm/day. Fiber: >=25 gm/day. Water: >=96 oz/day. Can increase Gabapentin to 200 mg at bedtime. Follow up in 6 weeks. Problem 3  - MELCHOR   HPI   - Doing well with CPAP, used to have tiredness, but better after CPAP  Assessment  - controlled. Plan   - continue CPAP with o2.  Weight loss can help with MELCHOR as well.    Problem 4  - DM2 with peripheral neuropathy  HPI   - Last hba1c was 5.9 from 7.0, on Metformin once a day, does have peripheral neuropathy that bothers her sleep gabapentin is helping but not enough, would like gabapentin dose increased. Assessment  - controlled. Plan   - Continue metformin. Weight reduction can help with T2DM as well. Gabapentin to 200 mg qhs, understands gabapentin may be associated with weight gain. Total time spent on encounter: 35 min. Rod Julien MD  Internal Medicine/Obesity Medicine  4/25/2022.

## 2022-06-06 ENCOUNTER — OFFICE VISIT (OUTPATIENT)
Dept: BARIATRICS/WEIGHT MGMT | Age: 52
End: 2022-06-06
Payer: COMMERCIAL

## 2022-06-06 VITALS
TEMPERATURE: 97.9 F | SYSTOLIC BLOOD PRESSURE: 105 MMHG | HEIGHT: 69 IN | BODY MASS INDEX: 43.4 KG/M2 | HEART RATE: 67 BPM | DIASTOLIC BLOOD PRESSURE: 57 MMHG | WEIGHT: 293 LBS

## 2022-06-06 DIAGNOSIS — G89.29 CHRONIC PAIN OF BOTH KNEES: ICD-10-CM

## 2022-06-06 DIAGNOSIS — M25.562 CHRONIC PAIN OF BOTH KNEES: ICD-10-CM

## 2022-06-06 DIAGNOSIS — E11.42 TYPE 2 DIABETES MELLITUS WITH DIABETIC POLYNEUROPATHY, WITHOUT LONG-TERM CURRENT USE OF INSULIN (HCC): ICD-10-CM

## 2022-06-06 DIAGNOSIS — E66.01 CLASS 3 SEVERE OBESITY DUE TO EXCESS CALORIES WITH SERIOUS COMORBIDITY AND BODY MASS INDEX (BMI) OF 45.0 TO 49.9 IN ADULT (HCC): ICD-10-CM

## 2022-06-06 DIAGNOSIS — E11.42 DIABETIC PERIPHERAL NEUROPATHY ASSOCIATED WITH TYPE 2 DIABETES MELLITUS (HCC): Primary | ICD-10-CM

## 2022-06-06 DIAGNOSIS — M25.561 CHRONIC PAIN OF BOTH KNEES: ICD-10-CM

## 2022-06-06 PROCEDURE — 99211 OFF/OP EST MAY X REQ PHY/QHP: CPT

## 2022-06-06 PROCEDURE — 3044F HG A1C LEVEL LT 7.0%: CPT | Performed by: INTERNAL MEDICINE

## 2022-06-06 PROCEDURE — 99214 OFFICE O/P EST MOD 30 MIN: CPT | Performed by: INTERNAL MEDICINE

## 2022-06-06 RX ORDER — GABAPENTIN 100 MG/1
200 CAPSULE ORAL NIGHTLY
Qty: 180 CAPSULE | Refills: 1 | Status: SHIPPED | OUTPATIENT
Start: 2022-06-06 | End: 2022-12-03

## 2022-06-06 NOTE — PROGRESS NOTES
CC -   Follow up of: Chronic knee pain - arthritis - needs BMI <42 for TKA, weight gain. BACKGROUND -   Last visit: 4/25/22  First visit: 2/14/22     Obesity (all weight in lbs)  Began early adulthood  Initial BMI 55.50, Wt 375.8, Ht 69\"  HS Grad wt 230   Lowest   wt 230   Highest  wt 380 (2021 June)  Pattern of wt gain: gradual  Wt change past yr: -5 lbs  Most wt lost: 40 lbs  Other diets attempted: [employer]. Did a lot of exercise when young    Desire to lose weight: 8/10  Problem posed by appetite: 4-5/10    Initial Diet:    Number of meals per day - 3    Number of snacks per day - 0-1 (about once a week)    Meal volume - 12\" plate,  occasional seconds    Fast food/convenience store - 0-1x/week    Restaurants (not fast food) - 0x/week (included in fast food)   Sweets - 3d/week (pastries, pumpkin bread, fruity chewy)   Chips - 0-1d/week   Crackers/pretzels - 0-1d/week   Nuts - 0d/week (diverticulitis)   Peanut Butter - 1d/week   Popcorn - 0d/week   Dried fruit - 0d/week   Whole fruit - 7d/week (bananas, apples, clementines, pineapples grapes)   Breakfast cereal - 0-1d/week   Granola/Protein/Energy bar - 0d/week   Sugar sweetened beverages - sweet tea ([place] 8-16 oz/day). Coffee with creamer (75 Eric/tbsp) - 3-4 tbsp/cup 3-4x/wk + 1 tbsp sugar. No soda/pops. Protein - No supplements   Fiber - No supplements (used to have fiber one bars)    Breakfast: cottage cheese with cherry tomatoes etc, or toast with peanut butter or cheese etc.  Lunch: TV dinner (pot pie, chinese etc), or yogurt + slice of cheese etc.  Dinner: varies, baked potatoes, roast and broccoli. Had honey fried chicken breast, 2 eggs, country fried potatoes served with biscuit (half of it). Initial Exercise:    Gym membership - no    Walking - no    Running - no    Resistance - no    Aerobic class - no. Has total body vibration machine.         Initial Sleep: Bedtime: 10-midnight, wake up time: 7-8 am - usually rested with CPAP + o2 2l/min, daytime naps: no.    Weight scale at home: yes, takes weight: daily  Food scale: yes (but unsure where it is)    Follow up 3/14/22:  1. Fatigue: Some tiredness today, but otherwise doing well. 2. Diet: Following low calorie diet plan, trying to keep up with protein and fiber intake    3. Exercise: around the house, planning to do more as it will get warmer. 4. Sleep: still using CPAP and o2, no daytime naps. 5. Medications: Not taking Tramadol currently, but otherwise    Follow up 4/25/22:  1. Fatigue: has some fatigue, d/t late hrs.    2. Diet: Egg substitute for breakfast, beef, turkey or chicken - for protein. Benefiber for fiber, cirkul with water flavor. 3. Exercise: increased activity (household)    4. Sleep: late hrs, but using cpap. No daytime naps. 5. Medications: No needing medication for appetite. On Metformin daily. Follow up 6/6/22:  1. Fatigue: Still some tiredness. 2. Diet: Protein - same as prior. Fiber - benefiber - trying to find balance due to constipation - using stool softner. Sometimes does not use benefiber if she is using beans. 3. Exercise: Walking down to [employer], about 1/2 miles and back. 4. Sleep: drifts off quickly, using cpap. 10->7-8.    5. Medications: Medications as mentioned below.  Does not feel     ______________________    [facility] -  Past Medical History:   Diagnosis Date    Basal cell carcinoma (BCC) of skin of nose     Chronic pain of both knees     Class 3 severe obesity due to excess calories with serious comorbidity and body mass index (BMI) of 50.0 to 59.9 in adult [facility])     Diverticulitis     Essential hypertension     History of DVT in adulthood     superficial, right lower extremity, provoked by surgery    Hypothyroidism     Kidney stone     Mild intermittent asthma without complication     MELCHOR (obstructive sleep apnea)     on CPAP, does not follow with Sleep Medicine since moving to [address] [place] [address] PCOS (polycystic ovarian syndrome)     Type 2 diabetes mellitus with diabetic polyneuropathy, without long-term current use of insulin ([facility] [address]    Unclear if patient has Sjogren's disease - was ruled out. RA - worked up for and ruled out. Past Surgical History:   Procedure Laterality Date    ANKLE SURGERY Bilateral      SECTION N/A     x 1    CYST REMOVAL N/A     chest    KIDNEY STONE REMOVAL      UMBILICAL HERNIA REPAIR N/A     x 2     Prior to Admission medications    Medication Sig Start Date End Date Taking? Authorizing Provider   metFORMIN (GLUCOPHAGE) 500 MG tablet Take 1 tablet by mouth daily (with breakfast) 4/15/22   Deneen Mccollum DO   traMADol (ULTRAM) 50 MG tablet Take 1 tablet by mouth every 8 hours as needed for Pain for up to 90 days. 4/15/22 7/14/22  Deneen Mccollum DO   gabapentin (NEURONTIN) 100 MG capsule Take 1 capsule by mouth at bedtime for 180 days.  Intended supply: 90 days 3/14/22 9/10/22  Tomas Valencia MD   levothyroxine (SYNTHROID) 75 MCG tablet Take 1 tablet by mouth Daily 22   Melita Sheehan,    spironolactone (ALDACTONE) 50 MG tablet Take 1 tablet by mouth daily 22   Dilcia Foots, DO   celecoxib (CELEBREX) 200 MG capsule Take 1 capsule by mouth 2 times daily as needed for Pain 22   Dilcia Foots, DO   Handicap Placard MISC by Does not apply route DURATION 5 YEARS 21   Dilcia Foots, DO   CINNAMON PO Take by mouth    Historical Provider, MD   TURMERIC PO Take by mouth    Historical Provider, MD   Calcium Citrate-Vitamin D (CALCIUM + D PO) Take by mouth    Historical Provider, MD   Ferrous Sulfate (IRON PO) Take by mouth    Historical Provider, MD   b complex vitamins capsule Take 1 capsule by mouth daily    Historical Provider, MD   ZINC PO Take by mouth    Historical Provider, MD   Ascorbic Acid (VITAMIN C PO) Take by mouth    Historical Provider, MD   Omega-3 Fatty Acids (FISH OIL PO) Take by mouth    Historical Provider, MD     Allergies: No Known Allergies, environmental allergies. Family history: DM: mother, Heart disease: father (passed away with MI at 43 yrs of age), father side. Social history: smoking: never ; Alcohol: no , work: sedentary work. 3 steps and has difficulty climbing up and down. ROS -  Card - no CP. GI - no N/V, has loose BM (watery, unkown why but had workup) once to twice a day. PE -  Gen : BP (!) 105/57 (Site: Left Upper Arm, Position: Sitting, Cuff Size: Thigh)   Pulse 67   Temp 97.9 °F (36.6 °C) (Temporal)   Ht 5' 9\" (1.753 m)   Wt (!) 336 lb 6.4 oz (152.6 kg)   BMI 49.68 kg/m²    WN, WD, NAD  Lung: Nml resp effort, CTA B/L at present  Heart:  RRR w/o MGR, trace - 1+ LE pitting edema b/l  Psych: Normal mood   Full affect  Neuro: Moves all ext well  ______________________    HISTORY & ASSESSMENT/PLAN -     Problem 1  - Bilateral knee pain, h/o ankle hardwares   HPI   - Ongoing, gradually progressing which pt feels like due to weight gain. Was told it is bone on bone, will need BMI<42 for surgery (<284 lbs)  Assessment  - Uncontrolled, weight gain is likely primary contributor   Plan   - currently is on Celebrex and tramadol prn that seems to be helping. Weight reduction can help not only with pain but will help her undergo surgery as well. Weight reduction per plan below    Problem 2  - Obesity   HPI   - See above Background for description    Weight  Date    375.8 lbs 2/14/22    360.4  3/14/22    347.0  4/25/22    336.4  6/6/22    Total weight change to date: -39.4   lbs. Average daily energy variance:   2/14/2022 - 3/14/2022: -13.8 lbs (62602 Eric)/27 days = -1789 Eric/day deficit (after correction of 1.6 lbs for water weight and starting the day after last visit). 3/14/2022 - 4/25/2022: -13.4 lbs (36142 Eric)/41 days = -1144 Eric/day deficit. 4/25/2022 - 6/6/2022: -10.6 lbs (79573 Eric)/41 days = -905 Eric/day deficit.     DEN = 2347 Eric/d    Assessment  - Uncontrolled - improving    Plan   - She doing great with current plan, and would like to continue. Talked about protein, fiber and water intake. Does not feel she needs an appetite suppressant. Planned for follow up in 3 months. Problem 3  - MELCHOR   HPI   - Doing well with CPAP, used to have tiredness, but better after CPAP  Assessment  - controlled. Plan   - continue CPAP with o2. Weight loss can help with MELCHOR as well. Problem 4  - DM2 with peripheral neuropathy  HPI   - Last hba1c was 5.9 from 7.0, on Metformin once a day, does have peripheral neuropathy that bothers her sleep gabapentin is helping but not enough, would like gabapentin dose increased. Assessment  - controlled. Plan   - Continue metformin. Weight reduction can help with T2DM as well. Gabapentin to 200 mg qhs, understands gabapentin may be associated with weight gain but 200 mg is helping and would like to continue. Orders Placed This Encounter   Medications    gabapentin (NEURONTIN) 100 MG capsule     Sig: Take 2 capsules by mouth at bedtime for 180 days. Intended supply: 90 days     Dispense:  180 capsule     Refill:  1     Total time spent on encounter: 31 min. Sherrell Vasquez MD  Internal Medicine/Obesity Medicine  6/6/2022.

## 2022-07-07 DIAGNOSIS — G89.29 BILATERAL CHRONIC KNEE PAIN: ICD-10-CM

## 2022-07-07 DIAGNOSIS — M25.562 BILATERAL CHRONIC KNEE PAIN: ICD-10-CM

## 2022-07-07 DIAGNOSIS — M25.561 BILATERAL CHRONIC KNEE PAIN: ICD-10-CM

## 2022-07-08 RX ORDER — TRAMADOL HYDROCHLORIDE 50 MG/1
TABLET ORAL
Qty: 90 TABLET | Refills: 0 | Status: SHIPPED
Start: 2022-07-08 | End: 2022-08-05

## 2022-07-08 NOTE — TELEPHONE ENCOUNTER
Medication Refill Request    LOV 4/15/2022  NOV 7/15/2022    Lab Results   Component Value Date    CREATININE 0.9 04/16/2022

## 2022-07-11 ENCOUNTER — HOSPITAL ENCOUNTER (OUTPATIENT)
Age: 52
Discharge: HOME | End: 2022-07-11
Payer: COMMERCIAL

## 2022-07-11 DIAGNOSIS — E03.9 ACQUIRED HYPOTHYROIDISM: ICD-10-CM

## 2022-07-11 DIAGNOSIS — I10 ESSENTIAL HYPERTENSION: ICD-10-CM

## 2022-07-11 DIAGNOSIS — E11.40 TYPE 2 DIABETES MELLITUS WITH DIABETIC NEUROPATHY, WITHOUT LONG-TERM CURRENT USE OF INSULIN (HCC): ICD-10-CM

## 2022-07-11 LAB
ALBUMIN SERPL-MCNC: 4.1 G/DL (ref 3.5–5.2)
ALP BLD-CCNC: 55 U/L (ref 35–104)
ALT SERPL-CCNC: 14 U/L (ref 0–32)
ANION GAP SERPL CALCULATED.3IONS-SCNC: 9 MMOL/L (ref 7–16)
AST SERPL-CCNC: 13 U/L (ref 0–31)
BASOPHILS ABSOLUTE: 0.02 E9/L (ref 0–0.2)
BASOPHILS RELATIVE PERCENT: 0.3 % (ref 0–2)
BILIRUB SERPL-MCNC: 0.5 MG/DL (ref 0–1.2)
BUN BLDV-MCNC: 9 MG/DL (ref 6–20)
CALCIUM SERPL-MCNC: 9.7 MG/DL (ref 8.6–10.2)
CHLORIDE BLD-SCNC: 106 MMOL/L (ref 98–107)
CO2: 28 MMOL/L (ref 22–29)
CREAT SERPL-MCNC: 0.8 MG/DL (ref 0.5–1)
EOSINOPHILS ABSOLUTE: 0.11 E9/L (ref 0.05–0.5)
EOSINOPHILS RELATIVE PERCENT: 1.6 % (ref 0–6)
GFR AFRICAN AMERICAN: >60
GFR NON-AFRICAN AMERICAN: >60 ML/MIN/1.73
GLUCOSE BLD-MCNC: 113 MG/DL (ref 74–99)
HBA1C MFR BLD: 5.4 % (ref 4–5.6)
HCT VFR BLD CALC: 38.2 % (ref 34–48)
HEMOGLOBIN: 12.2 G/DL (ref 11.5–15.5)
IMMATURE GRANULOCYTES #: 0.02 E9/L
IMMATURE GRANULOCYTES %: 0.3 % (ref 0–5)
LYMPHOCYTES ABSOLUTE: 1.92 E9/L (ref 1.5–4)
LYMPHOCYTES RELATIVE PERCENT: 28.4 % (ref 20–42)
MCH RBC QN AUTO: 28.2 PG (ref 26–35)
MCHC RBC AUTO-ENTMCNC: 31.9 % (ref 32–34.5)
MCV RBC AUTO: 88.4 FL (ref 80–99.9)
MONOCYTES ABSOLUTE: 0.37 E9/L (ref 0.1–0.95)
MONOCYTES RELATIVE PERCENT: 5.5 % (ref 2–12)
NEUTROPHILS ABSOLUTE: 4.31 E9/L (ref 1.8–7.3)
NEUTROPHILS RELATIVE PERCENT: 63.9 % (ref 43–80)
PDW BLD-RTO: 13.2 FL (ref 11.5–15)
PLATELET # BLD: 214 E9/L (ref 130–450)
PMV BLD AUTO: 10 FL (ref 7–12)
POTASSIUM SERPL-SCNC: 4.2 MMOL/L (ref 3.5–5)
RBC # BLD: 4.32 E12/L (ref 3.5–5.5)
SODIUM BLD-SCNC: 143 MMOL/L (ref 132–146)
TOTAL PROTEIN: 7.5 G/DL (ref 6.4–8.3)
TSH SERPL DL<=0.05 MIU/L-ACNC: 2.18 UIU/ML (ref 0.27–4.2)
WBC # BLD: 6.8 E9/L (ref 4.5–11.5)

## 2022-07-11 PROCEDURE — 83036 HEMOGLOBIN GLYCOSYLATED A1C: CPT

## 2022-07-11 PROCEDURE — 85025 COMPLETE CBC W/AUTO DIFF WBC: CPT

## 2022-07-11 PROCEDURE — 84443 ASSAY THYROID STIM HORMONE: CPT

## 2022-07-11 PROCEDURE — 80053 COMPREHEN METABOLIC PANEL: CPT

## 2022-07-11 PROCEDURE — 36415 COLL VENOUS BLD VENIPUNCTURE: CPT

## 2022-07-15 ENCOUNTER — OFFICE VISIT (OUTPATIENT)
Dept: FAMILY MEDICINE CLINIC | Age: 52
End: 2022-07-15
Payer: COMMERCIAL

## 2022-07-15 VITALS
WEIGHT: 293 LBS | TEMPERATURE: 97.1 F | SYSTOLIC BLOOD PRESSURE: 133 MMHG | BODY MASS INDEX: 49 KG/M2 | HEART RATE: 75 BPM | OXYGEN SATURATION: 99 % | DIASTOLIC BLOOD PRESSURE: 78 MMHG

## 2022-07-15 DIAGNOSIS — E11.42 TYPE 2 DIABETES MELLITUS WITH DIABETIC POLYNEUROPATHY, WITHOUT LONG-TERM CURRENT USE OF INSULIN (HCC): Primary | ICD-10-CM

## 2022-07-15 DIAGNOSIS — Z12.31 ENCOUNTER FOR SCREENING MAMMOGRAM FOR MALIGNANT NEOPLASM OF BREAST: ICD-10-CM

## 2022-07-15 DIAGNOSIS — E03.9 ACQUIRED HYPOTHYROIDISM: ICD-10-CM

## 2022-07-15 DIAGNOSIS — M17.0 PRIMARY OSTEOARTHRITIS OF BOTH KNEES: ICD-10-CM

## 2022-07-15 DIAGNOSIS — I10 ESSENTIAL HYPERTENSION: ICD-10-CM

## 2022-07-15 DIAGNOSIS — K62.5 RECTAL BLEEDING: ICD-10-CM

## 2022-07-15 PROCEDURE — G8417 CALC BMI ABV UP PARAM F/U: HCPCS | Performed by: FAMILY MEDICINE

## 2022-07-15 PROCEDURE — 99214 OFFICE O/P EST MOD 30 MIN: CPT | Performed by: FAMILY MEDICINE

## 2022-07-15 PROCEDURE — 2022F DILAT RTA XM EVC RTNOPTHY: CPT | Performed by: FAMILY MEDICINE

## 2022-07-15 PROCEDURE — G8427 DOCREV CUR MEDS BY ELIG CLIN: HCPCS | Performed by: FAMILY MEDICINE

## 2022-07-15 PROCEDURE — 3017F COLORECTAL CA SCREEN DOC REV: CPT | Performed by: FAMILY MEDICINE

## 2022-07-15 PROCEDURE — 3044F HG A1C LEVEL LT 7.0%: CPT | Performed by: FAMILY MEDICINE

## 2022-07-15 PROCEDURE — 1036F TOBACCO NON-USER: CPT | Performed by: FAMILY MEDICINE

## 2022-07-15 RX ORDER — LISINOPRIL 5 MG/1
5 TABLET ORAL DAILY
Qty: 30 TABLET | Refills: 2 | Status: SHIPPED
Start: 2022-07-15 | End: 2022-10-17

## 2022-07-15 ASSESSMENT — PATIENT HEALTH QUESTIONNAIRE - PHQ9
2. FEELING DOWN, DEPRESSED OR HOPELESS: 0
SUM OF ALL RESPONSES TO PHQ QUESTIONS 1-9: 0
SUM OF ALL RESPONSES TO PHQ9 QUESTIONS 1 & 2: 0
SUM OF ALL RESPONSES TO PHQ QUESTIONS 1-9: 0
1. LITTLE INTEREST OR PLEASURE IN DOING THINGS: 0
SUM OF ALL RESPONSES TO PHQ QUESTIONS 1-9: 0
SUM OF ALL RESPONSES TO PHQ QUESTIONS 1-9: 0

## 2022-07-15 ASSESSMENT — ENCOUNTER SYMPTOMS
CONSTIPATION: 1
VOMITING: 0
BLOOD IN STOOL: 1
WHEEZING: 0
ABDOMINAL PAIN: 0
SHORTNESS OF BREATH: 0
EYE PAIN: 0
COUGH: 0
DIARRHEA: 0
NAUSEA: 0

## 2022-07-15 ASSESSMENT — LIFESTYLE VARIABLES
HOW MANY STANDARD DRINKS CONTAINING ALCOHOL DO YOU HAVE ON A TYPICAL DAY: PATIENT DOES NOT DRINK
HOW OFTEN DO YOU HAVE A DRINK CONTAINING ALCOHOL: MONTHLY OR LESS

## 2022-07-15 NOTE — PROGRESS NOTES
7/15/2022    Steve Perez is a 45 y.o. female here for:    Chief Complaint   Patient presents with    Diabetes     3 month follow-up    Hypertension     3 month follow-up    Hypothyroidism     3 month follow-up    Knee Pain     3 month follow-up    Rectal Bleeding        HPI:  T2DM  - On metformin 500 mg QD. Reports compliance with medication. Denies side effects of medication.  - Does not check BG readings at home. - Last eye exam: March 2022  - Last foot exam: due   - Denies double vision, blurry vision, eye pain, polydipsia, and polyuria. - Admits to neuropathy. On gabapentin 200 mg HS. Gabapentin is controlling her neuropathy well. - Not on an ACE inhibitor.   - Not on a statin currently because LDL=56 and triglycerides= 74. The ASCVD Risk score (Lonza Topsham., et al., 2013) failed to calculate for the following reasons: The valid total cholesterol range is 130 to 320 mg/dL    HTN/PCOS  - On spironolactone 50 mg QD. Reports compliance with medication. Denies side effects of medication.   - Denies chest pain, palpitations, lightheadedness, dizziness, and syncope. Hypothyroidism   - On Synthroid 75 mcg QD. Reports compliance with medication. Denies side effects of medication.   - Admits to constipation. Has had recent dietary changes. - Denies hair loss. Chronic knee pain   - Started at age 13.   - Has failed corticosteroid injections while living in [place], [place]. - X-rays from 09/2021 show bilateral osteoarthritis. - Was referred by prior PCP to [facility]. Saw Dr. Nupur Peterson. Dr. Nupur Peterson told patient that she needs bilateral knee replacements, but she needs to lose weight first. Patient is following with [facility].   - On Celebrex 200 mg BID and tramadol 50 mg TID PRN. Rectal bleeding   - Started a few months ago. - Patient reports bright red blood on toilet paper when wiping and on outside of stool.  - Had a colonoscopy in her 35s for chronic diarrhea. Has not had a colonoscopy since that time.   - No FamHx of colon cancer or IBD. Current Outpatient Medications   Medication Sig Dispense Refill    Multiple Vitamins-Minerals (PRESERVISION AREDS PO) Take by mouth      lisinopril (PRINIVIL;ZESTRIL) 5 MG tablet Take 1 tablet by mouth in the morning. 30 tablet 2    traMADol (ULTRAM) 50 MG tablet TAKE 1 TABLET BY MOUTH EVERY 8 HOURS AS NEEDED FOR PAIN, REDUCE DOSES TAKEN AS PAIN BECOMES MANAGEABLE 90 tablet 0    gabapentin (NEURONTIN) 100 MG capsule Take 2 capsules by mouth at bedtime for 180 days. Intended supply: 90 days 180 capsule 1    levothyroxine (SYNTHROID) 75 MCG tablet Take 1 tablet by mouth Daily 90 tablet 2    celecoxib (CELEBREX) 200 MG capsule Take 1 capsule by mouth 2 times daily as needed for Pain 180 capsule 2    Handicap Placard MISC by Does not apply route DURATION 5 YEARS 1 each 0    CINNAMON PO Take by mouth      TURMERIC PO Take by mouth      Calcium Citrate-Vitamin D (CALCIUM + D PO) Take by mouth      Ferrous Sulfate (IRON PO) Take by mouth      b complex vitamins capsule Take 1 capsule by mouth daily      ZINC PO Take by mouth      Ascorbic Acid (VITAMIN C PO) Take by mouth      Omega-3 Fatty Acids (FISH OIL PO) Take by mouth       No current facility-administered medications for this visit.       No Known Allergies    Past Medical & Surgical History:      Diagnosis Date    Basal cell carcinoma (BCC) of skin of nose     Chronic pain of both knees     Class 3 severe obesity due to excess calories with serious comorbidity and body mass index (BMI) of 50.0 to 59.9 in adult [facility])     Diverticulitis     Essential hypertension     History of DVT in adulthood     superficial, right lower extremity, provoked by surgery    Hypothyroidism     Kidney stone     Mild intermittent asthma without complication     MELCHOR (obstructive sleep apnea)     on CPAP, does not follow with Sleep Medicine since moving to [place]    Osteoarthritis     PCOS (polycystic ovarian syndrome)     Type 2 diabetes mellitus with diabetic polyneuropathy, without long-term current use of insulin (HCC)      Past Surgical History:   Procedure Laterality Date    ANKLE SURGERY Bilateral      SECTION N/A     x 1    CYST REMOVAL N/A     chest    KIDNEY STONE REMOVAL      UMBILICAL HERNIA REPAIR N/A     x 2       Family History:      Problem Relation Age of Onset    Thyroid Disease Mother     High Blood Pressure Mother     Diabetes Mother     Depression Mother     Osteoporosis Mother     Obesity Mother     Stroke Mother     Glaucoma Mother     Heart Attack Father 43    Drug Abuse Sister         methamphetamine    Heart Disease Brother     Gout Brother     Diabetes Maternal Grandmother     Alcohol Abuse Maternal Grandfather     Lung Cancer Maternal Grandfather     Stroke Paternal Grandmother     Heart Attack Paternal Grandfather     Pancreatic Cancer Maternal Uncle     Prostate Cancer Paternal Uncle        Social History:  Social History     Tobacco Use    Smoking status: Never    Smokeless tobacco: Never   Substance Use Topics    Alcohol use: Not Currently     Comment: rare       Review of Systems   Constitutional:  Negative for chills and fever. Eyes:  Negative for pain and visual disturbance. Respiratory:  Negative for cough, shortness of breath and wheezing. Cardiovascular:  Negative for chest pain, palpitations and leg swelling. Gastrointestinal:  Positive for blood in stool (bright red blood on toilet paper) and constipation. Negative for abdominal pain, diarrhea, nausea and vomiting. Endocrine: Negative for polydipsia and polyuria. Neurological:  Positive for numbness. Negative for dizziness, syncope and light-headedness.      BP Readings from Last 3 Encounters:   07/15/22 133/78   22 (!) 105/57   22 113/67       VS:  /78 (Site: Left Lower Arm, Position: Sitting, Cuff Size: Large Adult)   Pulse 75   Temp 97.1 °F (36.2 °C)   Wt (!) 331 lb 12.8 oz (150.5 kg) SpO2 99%   BMI 49.00 kg/m²     Physical Exam  Vitals reviewed. Constitutional:       General: She is awake. She is not in acute distress. Appearance: She is well-developed and well-groomed. She is morbidly obese. She is not ill-appearing, toxic-appearing or diaphoretic. Neck:      Vascular: No carotid bruit. Cardiovascular:      Rate and Rhythm: Normal rate and regular rhythm. Heart sounds: S1 normal and S2 normal. No murmur heard. Pulmonary:      Breath sounds: No decreased breath sounds, wheezing, rhonchi or rales. Abdominal:      General: Bowel sounds are normal. There is no distension. Palpations: Abdomen is soft. Tenderness: There is no abdominal tenderness. There is no guarding or rebound. Musculoskeletal:      Cervical back: Neck supple. Right lower leg: No tenderness. No edema. Left lower leg: No tenderness. No edema. Skin:     General: Skin is warm and dry. Neurological:      General: No focal deficit present. Mental Status: She is alert. Psychiatric:         Attention and Perception: Attention normal.         Mood and Affect: Mood normal.         Speech: Speech normal.         Behavior: Behavior normal. Behavior is cooperative. Thought Content: Thought content normal.         Cognition and Memory: Cognition normal.         Judgment: Judgment normal.       Assessment/Plan:  1. Type 2 diabetes mellitus with diabetic polyneuropathy, without long-term current use of insulin (HCC)  Well-controlled. HgbA1c= 5.4% on 07/11/2022. Thus, will stop metformin 500 mg QD. Will do a trial off of medication. As patient's LDL= 56 and triglycerides= 74, we will hold off on adding a statin at this time. 2. Essential hypertension  Well-controlled. However, given her T2DM, will stop spironolactone 50 mg QD and start lisinopril 5 mg QD for renal protection. Follow-up in 1 month. - lisinopril (PRINIVIL;ZESTRIL) 5 MG tablet; Take 1 tablet by mouth in the morning. Dispense: 30 tablet; Refill: 2    3. Acquired hypothyroidism  Well-controlled. TSH within normal range on 07/11/2022. Continue Synthroid 75 mcg QD. 4. Primary osteoarthritis of both knees  Stable. Continue Celebrex 200 mg BID PRN and tramadol 50 mg q 8 hrs PRN. Pain contract signed today. Continue weight loss. 5. Rectal bleeding  New problem. Refer to General Surgery for colonoscopy. - Gricelda Jefferson MD, General Surgery,  [place]    6. Encounter for screening mammogram for malignant neoplasm of breast  - SARAY DIGITAL SCREEN BILATERAL PER PROTOCOL; Future      Return in about 1 month (around 8/15/2022) for follow-up for HTN.       Josafat Rodriguez,   Family Medicine

## 2022-07-26 ENCOUNTER — PATIENT MESSAGE (OUTPATIENT)
Dept: BARIATRICS/WEIGHT MGMT | Age: 52
End: 2022-07-26

## 2022-07-26 NOTE — TELEPHONE ENCOUNTER
From: Mirlande Marrufo  To: Dr. Garrett Alfaro: 7/26/2022 9:59 AM EDT  Subject: Refill Request    Dr. Jaspreet Schwartz,    I am having issues with [facility] and they have not sent me my refill for the Gabipentin you prescribed. Could you please call that into the [facility] on [address]?  I am completely out at this time. Thank you.

## 2022-08-05 DIAGNOSIS — G89.29 BILATERAL CHRONIC KNEE PAIN: ICD-10-CM

## 2022-08-05 DIAGNOSIS — M25.561 BILATERAL CHRONIC KNEE PAIN: ICD-10-CM

## 2022-08-05 DIAGNOSIS — M25.562 BILATERAL CHRONIC KNEE PAIN: ICD-10-CM

## 2022-08-05 RX ORDER — TRAMADOL HYDROCHLORIDE 50 MG/1
50 TABLET ORAL 3 TIMES DAILY PRN
Qty: 90 TABLET | Refills: 0 | Status: SHIPPED
Start: 2022-08-05 | End: 2022-08-30

## 2022-08-05 NOTE — TELEPHONE ENCOUNTER
Medication Refill Request    LOV 7/15/2022  NOV 8/25/2022    Lab Results   Component Value Date    CREATININE 0.8 07/11/2022

## 2022-08-06 NOTE — TELEPHONE ENCOUNTER
Spoke with pt by phone  Situation was resolved by her having [facility] contact [facility] for a transfer or the Rx  Karthik 45  08/06/22

## 2022-08-08 ENCOUNTER — OFFICE VISIT (OUTPATIENT)
Dept: SURGERY | Age: 52
End: 2022-08-08

## 2022-08-08 VITALS
OXYGEN SATURATION: 97 % | WEIGHT: 293 LBS | HEIGHT: 69 IN | SYSTOLIC BLOOD PRESSURE: 124 MMHG | DIASTOLIC BLOOD PRESSURE: 63 MMHG | HEART RATE: 86 BPM | BODY MASS INDEX: 43.4 KG/M2 | RESPIRATION RATE: 16 BRPM

## 2022-08-08 DIAGNOSIS — Z12.11 ENCOUNTER FOR SCREENING COLONOSCOPY: Primary | ICD-10-CM

## 2022-08-08 PROCEDURE — 99024 POSTOP FOLLOW-UP VISIT: CPT | Performed by: SURGERY

## 2022-08-08 ASSESSMENT — ENCOUNTER SYMPTOMS
EYES NEGATIVE: 1
ALLERGIC/IMMUNOLOGIC NEGATIVE: 1
VOMITING: 0
ABDOMINAL DISTENTION: 0
ANAL BLEEDING: 1
CONSTIPATION: 0
SHORTNESS OF BREATH: 0
ABDOMINAL PAIN: 0
DIARRHEA: 0
COUGH: 0
BLOOD IN STOOL: 0
NAUSEA: 0
RESPIRATORY NEGATIVE: 1
BACK PAIN: 0

## 2022-08-08 NOTE — PROGRESS NOTES
[facility]  HISTORY & PHYSICAL  ATTENDING NOTE    Chief Complaint   Patient presents with    Rectal Bleeding     Pt states that she has been having rectal bleeding recently, she believes it may be due to hemorrhoids. Last colonoscopy was age 26. Pt denies any family history of colon cancer. HPI:  45 y.o. female denies weight loss, diarrhea, , melena, , N/V, abdominal pain. she denies family history of colon cancer, Crohn's disease or colitis. She has some issues now with constipation which has been relieved with the stool softener she has been using but she goes every 1 to 3 days now. She is on a high-fiber high-protein diet. She does have some intermittent issues with hemorrhoids. She states that been present since her son was born about 21 years ago. She had a  section. Does not sound like she uses any over-the-counter medications to help with her hemorrhoids.     Past Medical History:   Diagnosis Date    Basal cell carcinoma (BCC) of skin of nose     Chronic pain of both knees     Class 3 severe obesity due to excess calories with serious comorbidity and body mass index (BMI) of 50.0 to 59.9 in adult [facility])     Diverticulitis     Essential hypertension     History of DVT in adulthood     superficial, right lower extremity, provoked by surgery    Hypothyroidism     Kidney stone     Mild intermittent asthma without complication     MELCHOR (obstructive sleep apnea)     on CPAP, does not follow with Sleep Medicine since moving to [place]    Osteoarthritis     PCOS (polycystic ovarian syndrome)     Type 2 diabetes mellitus with diabetic polyneuropathy, without long-term current use of insulin (HCC)        Past Surgical History:   Procedure Laterality Date    ANKLE SURGERY Bilateral      SECTION N/A     x 1    CYST REMOVAL N/A     chest    KIDNEY STONE REMOVAL      UMBILICAL HERNIA REPAIR N/A     x 2       Family History   Problem Relation Age of Onset    Thyroid visit. Review of Systems   Constitutional: Negative. Negative for activity change, appetite change and unexpected weight change. HENT: Negative. Eyes: Negative. Respiratory: Negative. Negative for cough and shortness of breath. Cardiovascular: Negative. Negative for chest pain and leg swelling. Gastrointestinal:  Positive for anal bleeding. Negative for abdominal distention, abdominal pain, blood in stool, constipation, diarrhea, nausea and vomiting. Endocrine: Negative. Genitourinary: Negative. Musculoskeletal: Negative. Negative for arthralgias, back pain, gait problem, joint swelling and myalgias. Skin: Negative. Allergic/Immunologic: Negative. Neurological: Negative. Negative for dizziness, weakness and headaches. Hematological: Negative. Psychiatric/Behavioral: Negative. Negative for confusion, decreased concentration and sleep disturbance. /63 (Site: Left Upper Arm, Position: Sitting, Cuff Size: Large Adult)   Pulse 86   Resp 16   Ht 5' 9\" (1.753 m)   Wt (!) 327 lb (148.3 kg)   SpO2 97%   BMI 48.29 kg/m²   Physical Exam  Constitutional:       Appearance: Normal appearance. She is obese. HENT:      Head: Normocephalic and atraumatic. Nose: Nose normal.      Mouth/Throat:      Mouth: Mucous membranes are moist.      Pharynx: Oropharynx is clear. Eyes:      Extraocular Movements: Extraocular movements intact. Pupils: Pupils are equal, round, and reactive to light. Cardiovascular:      Rate and Rhythm: Normal rate and regular rhythm. Pulses: Normal pulses. Heart sounds: Normal heart sounds. Pulmonary:      Effort: Pulmonary effort is normal.      Breath sounds: Normal breath sounds. Abdominal:      General: There is no distension. Palpations: Abdomen is soft. Tenderness: There is no abdominal tenderness. Genitourinary:     Rectum: Internal hemorrhoid present.           Comments: Multiple perianal skin tags  Musculoskeletal:         General: No tenderness or signs of injury. Cervical back: Normal range of motion and neck supple. Skin:     General: Skin is warm and dry. Neurological:      General: No focal deficit present. Mental Status: She is alert and oriented to person, place, and time. Psychiatric:         Mood and Affect: Mood normal.         Behavior: Behavior normal.         Thought Content: Thought content normal.         Judgment: Judgment normal.         ASSESSMENT/PLAN:  Age as a risk factor for colon cancer-- plan for colonoscopy  Grade 2 internal hemorrhoids and external skin tags--discussed hemorrhoidectomy versus in office banding with patient. She will consider in office banding once colonoscopy is completed. Prep: Clenpiq    Colonoscopy. The patient was explained the risks/benefits/alternatives/expected outcomes of the procedure. The patient was explained the risks of the procedure, including, but not limited to, the risk of reaction to the anesthesia medicine and the risk of perforation requiring further surgery. The patient was informed that they may require biopsy or polypectomy. These procedures may increase the risk of complication. All questions were answered. The patient verbalized understanding and agreed to proceed.     Mally March MD, MSc, FACS  8/8/2022  1:59 PM

## 2022-08-12 ENCOUNTER — TELEPHONE (OUTPATIENT)
Dept: SURGERY | Age: 52
End: 2022-08-12

## 2022-08-12 NOTE — TELEPHONE ENCOUNTER
Patient is scheduled for COLONOSCOPY with   on 22 at 11:15 am with an arrival time of 10 am. Pt accepted date and time and verbalized understanding. Procedure letter mailed to patient as well. Prior Authorization Form:      DEMOGRAPHICS:                     Patient Name:  Crow Naranjo  Patient :  1970            Insurance:  Payor: [facility] / Plan: [facility] / Product Type: *No Product type* /   Insurance ID Number:    Payer/Plan Subscr  Sex Relation Sub. Ins. ID Effective Group Num   1. [id number] Zain Nolasco  [place] 1970 Female Self [id number]                                   [address]   2.  [address] 1967 Male Spouse [id number]                                    [id number]         DIAGNOSIS & PROCEDURE:                       Procedure/Operation: COLONOSCOPY           CPT Code: 37839    Diagnosis:  SCREENING    ICD10 Code: Z12.11    Location:  [facility]    Surgeon:  DR. Brian Mas    SCHEDULING INFORMATION:                          Date: 22    Time: 11:15 AM              Anesthesia:  MAC/TIVA                                                       Status:  Outpatient        Special Comments:  N/A       Electronically signed by Clyde Dyson on 2022 at 4:27 PM

## 2022-08-25 ENCOUNTER — OFFICE VISIT (OUTPATIENT)
Dept: FAMILY MEDICINE CLINIC | Age: 52
End: 2022-08-25
Payer: COMMERCIAL

## 2022-08-25 VITALS
WEIGHT: 293 LBS | HEART RATE: 73 BPM | SYSTOLIC BLOOD PRESSURE: 133 MMHG | TEMPERATURE: 97.2 F | BODY MASS INDEX: 48.97 KG/M2 | DIASTOLIC BLOOD PRESSURE: 74 MMHG | OXYGEN SATURATION: 99 %

## 2022-08-25 DIAGNOSIS — Z99.89 OSA ON CPAP: ICD-10-CM

## 2022-08-25 DIAGNOSIS — I10 ESSENTIAL HYPERTENSION: Primary | ICD-10-CM

## 2022-08-25 DIAGNOSIS — E11.9 TYPE 2 DIABETES MELLITUS WITHOUT COMPLICATION, WITHOUT LONG-TERM CURRENT USE OF INSULIN (HCC): ICD-10-CM

## 2022-08-25 DIAGNOSIS — G47.33 OSA ON CPAP: ICD-10-CM

## 2022-08-25 DIAGNOSIS — E03.9 ACQUIRED HYPOTHYROIDISM: ICD-10-CM

## 2022-08-25 PROCEDURE — 99214 OFFICE O/P EST MOD 30 MIN: CPT | Performed by: FAMILY MEDICINE

## 2022-08-25 PROCEDURE — G8417 CALC BMI ABV UP PARAM F/U: HCPCS | Performed by: FAMILY MEDICINE

## 2022-08-25 PROCEDURE — 2022F DILAT RTA XM EVC RTNOPTHY: CPT | Performed by: FAMILY MEDICINE

## 2022-08-25 PROCEDURE — 1036F TOBACCO NON-USER: CPT | Performed by: FAMILY MEDICINE

## 2022-08-25 PROCEDURE — 3017F COLORECTAL CA SCREEN DOC REV: CPT | Performed by: FAMILY MEDICINE

## 2022-08-25 PROCEDURE — G8427 DOCREV CUR MEDS BY ELIG CLIN: HCPCS | Performed by: FAMILY MEDICINE

## 2022-08-25 PROCEDURE — 3044F HG A1C LEVEL LT 7.0%: CPT | Performed by: FAMILY MEDICINE

## 2022-08-25 RX ORDER — LEVOTHYROXINE SODIUM 0.07 MG/1
75 TABLET ORAL DAILY
Qty: 30 TABLET | Refills: 5 | Status: SHIPPED | OUTPATIENT
Start: 2022-08-25

## 2022-08-25 ASSESSMENT — ENCOUNTER SYMPTOMS
NAUSEA: 0
DIARRHEA: 0
ABDOMINAL PAIN: 0
COUGH: 0
WHEEZING: 0
BLOOD IN STOOL: 0
SHORTNESS OF BREATH: 0
CONSTIPATION: 0
EYE PAIN: 0
VOMITING: 0

## 2022-08-25 NOTE — PROGRESS NOTES
8/25/2022    Mirlande Marrufo is a 45 y.o. female here for:    Chief Complaint   Patient presents with    Hypertension     1 month follow-up    Diabetes     1 month follow-up        HPI:  T2DM  - Last HgbA1c= 5.4% on 07/11/2022. - Metformin 500 mg QD was stopped at last appointment with me. - Home fasting BG readings: 109, 101, 115, 111, 113, 102, 111, 119, 119, 138, 108, 117, 119, 114, 114, 113, 100, 99, 99, 107, 109, 110, 111, 107    HTN   - On lisinopril 5 mg QD. Reports compliance with medication. Denies side effects of medication. Current Outpatient Medications   Medication Sig Dispense Refill    levothyroxine (SYNTHROID) 75 MCG tablet Take 1 tablet by mouth Daily 30 tablet 5    traMADol (ULTRAM) 50 MG tablet Take 1 tablet by mouth 3 times daily as needed for Pain for up to 30 days. 90 tablet 0    Multiple Vitamins-Minerals (PRESERVISION AREDS PO) Take by mouth      lisinopril (PRINIVIL;ZESTRIL) 5 MG tablet Take 1 tablet by mouth in the morning. 30 tablet 2    gabapentin (NEURONTIN) 100 MG capsule Take 2 capsules by mouth at bedtime for 180 days. Intended supply: 90 days 180 capsule 1    celecoxib (CELEBREX) 200 MG capsule Take 1 capsule by mouth 2 times daily as needed for Pain 180 capsule 2    Handicap Placard MISC by Does not apply route DURATION 5 YEARS 1 each 0    CINNAMON PO Take by mouth      TURMERIC PO Take by mouth      Calcium Citrate-Vitamin D (CALCIUM + D PO) Take by mouth      Ferrous Sulfate (IRON PO) Take by mouth      b complex vitamins capsule Take 1 capsule by mouth daily      ZINC PO Take by mouth      Ascorbic Acid (VITAMIN C PO) Take by mouth      Omega-3 Fatty Acids (FISH OIL PO) Take by mouth       No current facility-administered medications for this visit.       No Known Allergies    Past Medical & Surgical History:      Diagnosis Date    Basal cell carcinoma (BCC) of skin of nose     Chronic pain of both knees     Class 3 severe obesity due to excess calories with serious comorbidity and body mass index (BMI) of 50.0 to 59.9 in adult [facility])     Diverticulitis     Essential hypertension     History of DVT in adulthood     superficial, right lower extremity, provoked by surgery    Hypothyroidism     Kidney stone     Mild intermittent asthma without complication     MELCHOR (obstructive sleep apnea)     on CPAP, does not follow with Sleep Medicine since moving to [address] [place] [address] Dr    Osteoarthritis     PCOS (polycystic ovarian syndrome)     Type 2 diabetes mellitus with diabetic polyneuropathy, without long-term current use of insulin (HCC)      Past Surgical History:   Procedure Laterality Date    ANKLE SURGERY Bilateral      SECTION N/A     x 1    CYST REMOVAL N/A     chest    KIDNEY STONE REMOVAL      UMBILICAL HERNIA REPAIR N/A     x 2       Family History:      Problem Relation Age of Onset    Thyroid Disease Mother     High Blood Pressure Mother     Diabetes Mother     Depression Mother     Osteoporosis Mother     Obesity Mother     Stroke Mother     Glaucoma Mother     Heart Attack Father 43    Drug Abuse Sister         methamphetamine    Heart Disease Brother     Gout Brother     Pancreatic Cancer Maternal Uncle         half brother    Prostate Cancer Paternal Uncle     Diabetes Maternal Grandmother     Alcohol Abuse Maternal Grandfather     Lung Cancer Maternal Grandfather     Stroke Paternal Grandmother     Heart Attack Paternal Grandfather        Social History:  Social History     Tobacco Use    Smoking status: Never    Smokeless tobacco: Never   Substance Use Topics    Alcohol use: Not Currently     Comment: rare       Review of Systems   Constitutional:  Negative for chills and fever. Eyes:  Negative for pain and visual disturbance. Respiratory:  Negative for cough, shortness of breath and wheezing. Cardiovascular:  Negative for chest pain, palpitations and leg swelling.    Gastrointestinal:  Negative for abdominal pain, blood in stool, constipation, diarrhea, nausea and vomiting. Neurological:  Negative for dizziness, syncope and light-headedness. BP Readings from Last 3 Encounters:   08/25/22 133/74   08/08/22 124/63   07/15/22 133/78       VS:  /74 (Site: Right Upper Arm, Position: Sitting, Cuff Size: Large Adult)   Pulse 73   Temp 97.2 °F (36.2 °C)   Wt (!) 331 lb 9.6 oz (150.4 kg)   SpO2 99%   BMI 48.97 kg/m²     Physical Exam  Vitals reviewed. Constitutional:       General: She is awake. She is not in acute distress. Appearance: She is well-developed and well-groomed. She is morbidly obese. She is not ill-appearing, toxic-appearing or diaphoretic. Neck:      Vascular: No carotid bruit. Cardiovascular:      Rate and Rhythm: Normal rate and regular rhythm. Heart sounds: S1 normal and S2 normal. No murmur heard. Pulmonary:      Breath sounds: No decreased breath sounds, wheezing, rhonchi or rales. Abdominal:      General: Bowel sounds are normal. There is no distension. Palpations: Abdomen is soft. Tenderness: There is no abdominal tenderness. There is no guarding or rebound. Musculoskeletal:      Cervical back: Neck supple. Right lower leg: No tenderness. No edema. Left lower leg: No tenderness. No edema. Skin:     General: Skin is warm and dry. Neurological:      General: No focal deficit present. Mental Status: She is alert. Psychiatric:         Attention and Perception: Attention and perception normal.         Mood and Affect: Mood and affect normal.         Speech: Speech normal.         Behavior: Behavior normal. Behavior is cooperative. Thought Content: Thought content normal.         Cognition and Memory: Cognition and memory normal.         Judgment: Judgment normal.       Assessment/Plan:  1. Essential hypertension  Well-controlled. Continue lisinopril 5 mg QD. Follow-up in 3 months.      2. Type 2 diabetes mellitus without complication, without long-term current use of insulin ([address]  Well-controlled. Continue to monitor off of metformin 500 mg QD. Follow-up in 3 months with repeat blood work. - CBC with Auto Differential; Future  - Comprehensive Metabolic Panel; Future  - LIPID PANEL; Future  - Hemoglobin A1C; Future    3. MELCHOR on CPAP  Chronic. Patient has MELCHOR and wears CPAP. Patient moved from [place] and needs a referral to a Sleep Medicine physician for CPAP management and equipment reorder. Referral to Dr. Joana Rodriguez placed today. - [facility]) - Compa Avalos MD, Pulmonary, [place]    4. Acquired hypothyroidism  - levothyroxine (SYNTHROID) 75 MCG tablet; Take 1 tablet by mouth Daily  Dispense: 30 tablet; Refill: 5  - TSH; Future      Return in about 3 months (around 11/25/2022) for 3 month med check.       Orlin Bond, DO  Family Medicine

## 2022-08-29 ENCOUNTER — OFFICE VISIT (OUTPATIENT)
Dept: BARIATRICS/WEIGHT MGMT | Age: 52
End: 2022-08-29
Payer: COMMERCIAL

## 2022-08-29 VITALS
BODY MASS INDEX: 43.4 KG/M2 | DIASTOLIC BLOOD PRESSURE: 74 MMHG | HEART RATE: 66 BPM | WEIGHT: 293 LBS | SYSTOLIC BLOOD PRESSURE: 138 MMHG | HEIGHT: 69 IN | TEMPERATURE: 97.3 F

## 2022-08-29 DIAGNOSIS — M25.562 CHRONIC PAIN OF BOTH KNEES: Primary | ICD-10-CM

## 2022-08-29 DIAGNOSIS — M25.561 CHRONIC PAIN OF BOTH KNEES: Primary | ICD-10-CM

## 2022-08-29 DIAGNOSIS — E66.01 CLASS 3 SEVERE OBESITY DUE TO EXCESS CALORIES WITH SERIOUS COMORBIDITY AND BODY MASS INDEX (BMI) OF 45.0 TO 49.9 IN ADULT (HCC): ICD-10-CM

## 2022-08-29 DIAGNOSIS — G89.29 CHRONIC PAIN OF BOTH KNEES: Primary | ICD-10-CM

## 2022-08-29 PROCEDURE — 99211 OFF/OP EST MAY X REQ PHY/QHP: CPT

## 2022-08-29 PROCEDURE — 99215 OFFICE O/P EST HI 40 MIN: CPT | Performed by: INTERNAL MEDICINE

## 2022-08-29 NOTE — PROGRESS NOTES
CC -   Follow up of: Chronic knee pain - arthritis - needs BMI <42 for TKA, weight gain. BACKGROUND -   Last visit: 6/6/22  First visit: 2/14/22    Obesity (all weight in lbs)  Began early adulthood  Initial BMI 55.50, Wt 375.8, Ht 69\"  HS Grad wt 230   Lowest   wt 230   Highest  wt 380 (2021 June)  Pattern of wt gain: gradual  Wt change past yr: -5 lbs  Most wt lost: 40 lbs  Other diets attempted: [employer]. Did a lot of exercise when young    Desire to lose weight: 8/10  Problem posed by appetite: 4-5/10    Initial Diet:    Number of meals per day - 3    Number of snacks per day - 0-1 (about once a week)    Meal volume - 12\" plate,  occasional seconds    Fast food/convenience store - 0-1x/week    Restaurants (not fast food) - 0x/week (included in fast food)   Sweets - 3d/week (pastries, pumpkin bread, fruity chewy)   Chips - 0-1d/week   Crackers/pretzels - 0-1d/week   Nuts - 0d/week (diverticulitis)   Peanut Butter - 1d/week   Popcorn - 0d/week   Dried fruit - 0d/week   Whole fruit - 7d/week (bananas, apples, clementines, pineapples grapes)   Breakfast cereal - 0-1d/week   Granola/Protein/Energy bar - 0d/week   Sugar sweetened beverages - sweet tea ([place] 8-16 oz/day). Coffee with creamer (75 Eric/tbsp) - 3-4 tbsp/cup 3-4x/wk + 1 tbsp sugar. No soda/pops. Protein - No supplements   Fiber - No supplements (used to have fiber one bars)    Breakfast: cottage cheese with cherry tomatoes etc, or toast with peanut butter or cheese etc.  Lunch: TV dinner (pot pie, chinese etc), or yogurt + slice of cheese etc.  Dinner: varies, baked potatoes, roast and broccoli. Had honey fried chicken breast, 2 eggs, country fried potatoes served with biscuit (half of it). Initial Exercise:    Gym membership - no    Walking - no    Running - no    Resistance - no    Aerobic class - no. Has total body vibration machine.         Initial Sleep: Bedtime: 10-midnight, wake up time: 7-8 am - usually rested with CPAP + o2 2l/min, daytime naps: no.    ______________________    [facility] -  Past Medical History:   Diagnosis Date    Basal cell carcinoma (BCC) of skin of nose     Chronic pain of both knees     Class 3 severe obesity due to excess calories with serious comorbidity and body mass index (BMI) of 50.0 to 59.9 in adult [facility])     Diverticulitis     Essential hypertension     History of DVT in adulthood     superficial, right lower extremity, provoked by surgery    Hypothyroidism     Kidney stone     Mild intermittent asthma without complication     MELCHOR (obstructive sleep apnea)     on CPAP, does not follow with Sleep Medicine since moving to [place]    Osteoarthritis     PCOS (polycystic ovarian syndrome)     Type 2 diabetes mellitus with diabetic polyneuropathy, without long-term current use of insulin ([facility] [address]    Unclear if patient has Sjogren's disease - was ruled out. RA - worked up for and ruled out. Past Surgical History:   Procedure Laterality Date    ANKLE SURGERY Bilateral      SECTION N/A     x 1    CYST REMOVAL N/A     chest    KIDNEY STONE REMOVAL      UMBILICAL HERNIA REPAIR N/A     x 2     Prior to Admission medications    Medication Sig Start Date End Date Taking? Authorizing Provider   levothyroxine (SYNTHROID) 75 MCG tablet Take 1 tablet by mouth Daily 22   Ioana Mccollum DO   traMADol (ULTRAM) 50 MG tablet Take 1 tablet by mouth 3 times daily as needed for Pain for up to 30 days. 22  Apryl Colmenares,    Multiple Vitamins-Minerals (PRESERVISION AREDS PO) Take by mouth    Historical Provider, MD   lisinopril (PRINIVIL;ZESTRIL) 5 MG tablet Take 1 tablet by mouth in the morning. 7/15/22   Ioana Mccollum DO   gabapentin (NEURONTIN) 100 MG capsule Take 2 capsules by mouth at bedtime for 180 days.  Intended supply: 90 days 6/6/22 12/3/22  Ibis Kumar MD   celecoxib (CELEBREX) 200 MG capsule Take 1 capsule by mouth 2 times daily as needed for Pain 22   Leesa Boyle DO   Handdedep Nomi MISC by Does not apply route DURATION 5 YEARS 11/23/21   Ricarda Alvarez,    CINNAMON PO Take by mouth    Historical Provider, MD   TURMERIC PO Take by mouth    Historical Provider, MD   Calcium Citrate-Vitamin D (CALCIUM + D PO) Take by mouth    Historical Provider, MD   Ferrous Sulfate (IRON PO) Take by mouth    Historical Provider, MD   b complex vitamins capsule Take 1 capsule by mouth daily    Historical Provider, MD   ZINC PO Take by mouth    Historical Provider, MD   Ascorbic Acid (VITAMIN C PO) Take by mouth    Historical Provider, MD   Omega-3 Fatty Acids (FISH OIL PO) Take by mouth    Historical Provider, MD     Allergies: No Known Allergies, environmental allergies. Family history: DM: mother, Heart disease: father (passed away with MI at 43 yrs of age), father side. Social history: smoking: never ; Alcohol: no , work: sedentary work. 3 steps and has difficulty climbing up and down. ROS -  Card - no CP. GI - no N/V, has loose BM (watery, unkown why but had workup) once to twice a day. PE -  Gen : /74 (Site: Left Upper Arm, Position: Sitting, Cuff Size: Large Adult)   Pulse 66   Temp 97.3 °F (36.3 °C) (Temporal)   Ht 5' 9\" (1.753 m)   Wt (!) 328 lb (148.8 kg)   BMI 48.44 kg/m²    WN, WD, NAD  Lung: Nml resp effort, CTA B/L at present  Heart:  RRR w/o MGR, trace - 1+ LE pitting edema b/l  Psych: Normal mood   Full affect  Neuro: Moves all ext well  ______________________    HISTORY & ASSESSMENT/PLAN -     Problem 1  - Bilateral knee pain, h/o ankle hardwares   HPI   - Ongoing, gradually progressing which pt feels like due to weight gain. Was told it is bone on bone, will need BMI<42 for surgery (<284 lbs)  Assessment  - Uncontrolled, weight gain is likely primary contributor   Plan   - currently is on Celebrex and tramadol prn that seems to be helping. Weight reduction can help not only with pain but will help her undergo surgery as well.  Weight reduction per plan below    Problem 2  - Obesity   HPI   - See above Background for description    Weight  Date    375.8 lbs 2/14/22    360.4  3/14/22    347.0  4/25/22    336.4  6/6/22    328.0  8/29/22    Total weight change to date: -47.8 lbs. Average daily energy variance:   2/14/2022 - 3/14/2022: -13.8 lbs (32741 Eric)/27 days = -1789 Eric/day deficit (after correction of 1.6 lbs for water weight and starting the day after last visit). 3/14/2022 - 4/25/2022: -13.4 lbs (77883 Eric)/41 days = -1144 Eric/day deficit. 4/25/2022 - 6/6/2022: -10.6 lbs (40864 Eric)/41 days = -905 Eric/day deficit. 6/6/2022 - 8/29/2022: -8.4 lbs (26151 Eric)/84 days = -350 Eric/day deficit. DEN = 2347 Eric/d    Update:  Calorie monitoring: calorie conscious  Sweets: 2d/month (+ some animal crackers)  SSB: some SSB, iced tea, rony tea sbux  Restaurant food: 1x/wk  Protein: egg substitute, meat  Fiber: feels she has a problem balancing - had stool softner, digestive gummies (has quit fiber currently)  Water: cirkul (not getting abt 96 oz/day)  Activities: household work (eg laundry at basement, going to football games etc, has not been to gym as much lately)    Assessment  - improving    Plan   - She is doing fairly well, not counting calories currently but being calorie conscious, plans to count calories again. She does not feel she needs an appetite suppressant.  Planned as follows:  Patient Instructions   Rules:  Count every calorie every day  Limit sweets to one day per month  Limit chips/crackers/pretzels/nuts/popcorn to 100 eric/day  Eliminate all sugar sweetened beverages (including fruit juice)  Limit restaurants (including fast food and food from a convenience store) to one time every two weeks while in town     Requirements:  Make sure protein intake is at least 75 grams per day   Make sure that fiber intake is at least 25 grams per day  Take one multivitamin every day     Targets:  Limit calorie intake to 1400 calories/day  Walk 30 minutes daily or equivalent (210 min/week)  Avoid eating 2 hours within bedtime. Tips:  Do not eat outside of the dining room or the kitchen  Do not eat while watching TV, videos, working on the computer or using a smart phone  Do not eat food out of a multi-serving bag or container. Follow up in 2 months. Total time spent on encounter: 42 min. Brandon Cope MD  Internal Medicine/Obesity Medicine  8/29/2022.

## 2022-08-29 NOTE — PATIENT INSTRUCTIONS
Rules:  Count every calorie every day  Limit sweets to one day per month  Limit chips/crackers/pretzels/nuts/popcorn to 100 martir/day  Eliminate all sugar sweetened beverages (including fruit juice)  Limit restaurants (including fast food and food from a convenience store) to one time every two weeks while in town     Requirements:  Make sure protein intake is at least 75 grams per day   Make sure that fiber intake is at least 25 grams per day  Take one multivitamin every day     Targets:  Limit calorie intake to 1400 calories/day  Walk 30 minutes daily or equivalent (210 min/week)  Avoid eating 2 hours within bedtime. Tips:  Do not eat outside of the dining room or the kitchen  Do not eat while watching TV, videos, working on the computer or using a smart phone  Do not eat food out of a multi-serving bag or container. Follow up in 2 months.

## 2022-08-30 DIAGNOSIS — M25.562 BILATERAL CHRONIC KNEE PAIN: ICD-10-CM

## 2022-08-30 DIAGNOSIS — G89.29 BILATERAL CHRONIC KNEE PAIN: ICD-10-CM

## 2022-08-30 DIAGNOSIS — M25.561 BILATERAL CHRONIC KNEE PAIN: ICD-10-CM

## 2022-08-30 RX ORDER — TRAMADOL HYDROCHLORIDE 50 MG/1
50 TABLET ORAL EVERY 8 HOURS PRN
Qty: 90 TABLET | Refills: 0 | Status: SHIPPED
Start: 2022-08-30 | End: 2022-09-28

## 2022-08-30 NOTE — TELEPHONE ENCOUNTER
Medication Refill Request    LOV 8/25/2022  NOV 11/29/2022    Lab Results   Component Value Date    CREATININE 0.8 07/11/2022

## 2022-09-08 NOTE — PROGRESS NOTES
[facility] PRE-ADMISSION TESTING   ENDOSCOPY/ COLONSCOPY INSTRUCTIONS  PAT- Phone Number: [phone]    ENDOSCOPY/ COLONSCOPY INSTRUCTIONS:     [x] Bowel Prep instructions reviewed. [x] Colonoscopy- The day prior: No solid foods. Clear liquids only. [x] Nothing by mouth after midnight. Including no gum, candy, mints, or water. [x] You may brush your teeth, gargle, but do NOT swallow water. [x] Do not wear makeup, lotions, powders, deodorant. [] Urine Pregnancy test will be preformed the day of surgery. A specimen sample may be brought from home. [x] Arrange transportation with a responsible adult  to and from the hospital. If you do not have a responsible adult  to transport you, you will need to make arrangements with a medical transportation company. Arrange for someone to be with you for the remainder of the day and for 24 hours after your procedure due to having had anesthesia. -Who will be your  for transportation?___Maninder_____   -Who will be staying with you for 24 hrs after your procedure?__________________    PARKING INSTRUCTIONS:     [x] ARRIVAL DATE & TIME: 0930  [x] Enter into the [employer]. Two people may accompany you. Masks are required. [x] Parking Lot \"I\" is where you will park. It is located on the corner of [address] and [address]. The entrance is on [address]. Upon entering the parking lot, a voucher ticket will print    EDUCATION INSTRUCTIONS:    [x] Bring a complete list of your medications, please write the last time you took the medicine, give this list to the nurse in Pre-Op. [x] Take only the following medications the morning of surgery with 1-2 ounces of water: Ultram if needed   [x] Stop all herbal supplements and vitamins 5 days before surgery. Stop NSAIDS 7 days before surgery. [x] DO NOT take any diabetic medicine the morning of surgery.   Follow instructions for insulin the day before surgery. [x] If you are diabetic and your blood sugar is low or you feel symptomatic, you may drink 1-2 ounces of apple juice or take a glucose tablet.            -The morning of your procedure, you may call the pre-op area if you have concerns about your blood sugar [phone]. [] Use your inhalers the morning of surgery. Bring your emergency inhaler with you day of surgery. [x] Follow physician instructions regarding any blood thinners you may be taking. WHAT TO EXPECT:    [x] The day of your procedure you will be greeted and checked in by the [employer].  In addition, you will be registered in the [place] by a Patient Access Representative. Please bring your photo ID and insurance card. A nurse will greet you in accordance to the time you are needed in the pre-op area to prepare you for surgery. Please do not be discouraged if you are not greeted in the order you arrive as there are many variables that are involved in patient preparation. Your patience is greatly appreciated as you wait for your nurse. Please bring in items such as: books, magazines, newspapers, electronics, or any other items  to occupy your time in the waiting area. [x]  Delays may occur. Staff will make a sincere effort to keep you informed of delays. If any delays occur with your procedure, we apologize ahead of time for your inconvenience as we recognize the value of your time.

## 2022-09-12 NOTE — PROGRESS NOTES
Patient notified of time change for upcoming procedure on 09/14/22. Patient is to arrive at 0900, procedure is scheduled for 1000. Patient verbalized understanding.

## 2022-09-13 ENCOUNTER — PREP FOR PROCEDURE (OUTPATIENT)
Dept: SURGERY | Age: 52
End: 2022-09-13

## 2022-09-13 RX ORDER — SODIUM CHLORIDE 9 MG/ML
INJECTION, SOLUTION INTRAVENOUS CONTINUOUS
Status: CANCELLED | OUTPATIENT
Start: 2022-09-13

## 2022-09-13 RX ORDER — SODIUM CHLORIDE 0.9 % (FLUSH) 0.9 %
5-40 SYRINGE (ML) INJECTION EVERY 12 HOURS SCHEDULED
Status: CANCELLED | OUTPATIENT
Start: 2022-09-13

## 2022-09-13 RX ORDER — SODIUM CHLORIDE 9 MG/ML
25 INJECTION, SOLUTION INTRAVENOUS PRN
Status: CANCELLED | OUTPATIENT
Start: 2022-09-13

## 2022-09-13 RX ORDER — SODIUM CHLORIDE 0.9 % (FLUSH) 0.9 %
5-40 SYRINGE (ML) INJECTION PRN
Status: CANCELLED | OUTPATIENT
Start: 2022-09-13

## 2022-09-13 NOTE — H&P (VIEW-ONLY)
[facility]  HISTORY & PHYSICAL  ATTENDING NOTE          Chief Complaint   Patient presents with    Rectal Bleeding       Pt states that she has been having rectal bleeding recently, she believes it may be due to hemorrhoids. Last colonoscopy was age 26. Pt denies any family history of colon cancer. HPI:  45 y.o. female denies weight loss, diarrhea, , melena, , N/V, abdominal pain. she denies family history of colon cancer, Crohn's disease or colitis. She has some issues now with constipation which has been relieved with the stool softener she has been using but she goes every 1 to 3 days now. She is on a high-fiber high-protein diet. She does have some intermittent issues with hemorrhoids. She states that been present since her son was born about 21 years ago. She had a  section. Does not sound like she uses any over-the-counter medications to help with her hemorrhoids.      Past Medical History        Past Medical History:   Diagnosis Date    Basal cell carcinoma (BCC) of skin of nose      Chronic pain of both knees      Class 3 severe obesity due to excess calories with serious comorbidity and body mass index (BMI) of 50.0 to 59.9 in adult [facility])      Diverticulitis      Essential hypertension      History of DVT in adulthood       superficial, right lower extremity, provoked by surgery    Hypothyroidism      Kidney stone      Mild intermittent asthma without complication      MELCHOR (obstructive sleep apnea)       on CPAP, does not follow with Sleep Medicine since moving to [place]    Osteoarthritis      PCOS (polycystic ovarian syndrome)      Type 2 diabetes mellitus with diabetic polyneuropathy, without long-term current use of insulin (HCC)              Past Surgical History         Past Surgical History:   Procedure Laterality Date    ANKLE SURGERY Bilateral       SECTION N/A       x 1    CYST REMOVAL N/A       chest    KIDNEY STONE REMOVAL UMBILICAL HERNIA REPAIR N/A       x 2            Family History         Family History   Problem Relation Age of Onset    Thyroid Disease Mother      High Blood Pressure Mother      Diabetes Mother      Depression Mother      Osteoporosis Mother      Obesity Mother      Stroke Mother      Glaucoma Mother      Heart Attack Father 43    Drug Abuse Sister           methamphetamine    Heart Disease Brother      Gout Brother      Diabetes Maternal Grandmother      Alcohol Abuse Maternal Grandfather      Lung Cancer Maternal Grandfather      Stroke Paternal Grandmother      Heart Attack Paternal Grandfather      Pancreatic Cancer Maternal Uncle      Prostate Cancer Paternal Uncle              No Known Allergies     Social History            Tobacco Use    Smoking status: Never    Smokeless tobacco: Never   Vaping Use    Vaping Use: Never used   Substance Use Topics    Alcohol use: Not Currently       Comment: rare    Drug use: Never         Current Facility-Administered Medications          Current Outpatient Medications   Medication Sig Dispense Refill    traMADol (ULTRAM) 50 MG tablet Take 1 tablet by mouth 3 times daily as needed for Pain for up to 30 days. 90 tablet 0    Multiple Vitamins-Minerals (PRESERVISION AREDS PO) Take by mouth        lisinopril (PRINIVIL;ZESTRIL) 5 MG tablet Take 1 tablet by mouth in the morning. 30 tablet 2    gabapentin (NEURONTIN) 100 MG capsule Take 2 capsules by mouth at bedtime for 180 days.  Intended supply: 90 days 180 capsule 1    levothyroxine (SYNTHROID) 75 MCG tablet Take 1 tablet by mouth Daily 90 tablet 2    celecoxib (CELEBREX) 200 MG capsule Take 1 capsule by mouth 2 times daily as needed for Pain 180 capsule 2    Handicap Placard MISC by Does not apply route DURATION 5 YEARS 1 each 0    CINNAMON PO Take by mouth        TURMERIC PO Take by mouth        Calcium Citrate-Vitamin D (CALCIUM + D PO) Take by mouth        Ferrous Sulfate (IRON PO) Take by mouth        b complex vitamins capsule Take 1 capsule by mouth daily        ZINC PO Take by mouth        Ascorbic Acid (VITAMIN C PO) Take by mouth        Omega-3 Fatty Acids (FISH OIL PO) Take by mouth          No current facility-administered medications for this visit. Review of Systems   Constitutional: Negative. Negative for activity change, appetite change and unexpected weight change. HENT: Negative. Eyes: Negative. Respiratory: Negative. Negative for cough and shortness of breath. Cardiovascular: Negative. Negative for chest pain and leg swelling. Gastrointestinal:  Positive for anal bleeding. Negative for abdominal distention, abdominal pain, blood in stool, constipation, diarrhea, nausea and vomiting. Endocrine: Negative. Genitourinary: Negative. Musculoskeletal: Negative. Negative for arthralgias, back pain, gait problem, joint swelling and myalgias. Skin: Negative. Allergic/Immunologic: Negative. Neurological: Negative. Negative for dizziness, weakness and headaches. Hematological: Negative. Psychiatric/Behavioral: Negative. Negative for confusion, decreased concentration and sleep disturbance. /63 (Site: Left Upper Arm, Position: Sitting, Cuff Size: Large Adult)   Pulse 86   Resp 16   Ht 5' 9\" (1.753 m)   Wt (!) 327 lb (148.3 kg)   SpO2 97%   BMI 48.29 kg/m²   Physical Exam  Constitutional:       Appearance: Normal appearance. She is obese. HENT:      Head: Normocephalic and atraumatic. Nose: Nose normal.      Mouth/Throat:      Mouth: Mucous membranes are moist.      Pharynx: Oropharynx is clear. Eyes:      Extraocular Movements: Extraocular movements intact. Pupils: Pupils are equal, round, and reactive to light. Cardiovascular:      Rate and Rhythm: Normal rate and regular rhythm. Pulses: Normal pulses. Heart sounds: Normal heart sounds.    Pulmonary:      Effort: Pulmonary effort is normal.      Breath sounds: Normal breath sounds. Abdominal:      General: There is no distension. Palpations: Abdomen is soft. Tenderness: There is no abdominal tenderness. Genitourinary:     Rectum: Internal hemorrhoid present. Comments: Multiple perianal skin tags  Musculoskeletal:         General: No tenderness or signs of injury. Cervical back: Normal range of motion and neck supple. Skin:     General: Skin is warm and dry. Neurological:      General: No focal deficit present. Mental Status: She is alert and oriented to person, place, and time. Psychiatric:         Mood and Affect: Mood normal.         Behavior: Behavior normal.         Thought Content: Thought content normal.         Judgment: Judgment normal.            ASSESSMENT/PLAN:  Age as a risk factor for colon cancer-- plan for colonoscopy  Grade 2 internal hemorrhoids and external skin tags--discussed hemorrhoidectomy versus in office banding with patient. She will consider in office banding once colonoscopy is completed. Prep: Clenpiq     Colonoscopy. The patient was explained the risks/benefits/alternatives/expected outcomes of the procedure. The patient was explained the risks of the procedure, including, but not limited to, the risk of reaction to the anesthesia medicine and the risk of perforation requiring further surgery. The patient was informed that they may require biopsy or polypectomy. These procedures may increase the risk of complication. All questions were answered. The patient verbalized understanding and agreed to proceed.      Catracho Herring MD, MSc, FACS  8/8/2022  1:59 PM

## 2022-09-13 NOTE — H&P
[facility]  HISTORY & PHYSICAL  ATTENDING NOTE          Chief Complaint   Patient presents with    Rectal Bleeding       Pt states that she has been having rectal bleeding recently, she believes it may be due to hemorrhoids. Last colonoscopy was age 26. Pt denies any family history of colon cancer. HPI:  45 y.o. female denies weight loss, diarrhea, , melena, , N/V, abdominal pain. she denies family history of colon cancer, Crohn's disease or colitis. She has some issues now with constipation which has been relieved with the stool softener she has been using but she goes every 1 to 3 days now. She is on a high-fiber high-protein diet. She does have some intermittent issues with hemorrhoids. She states that been present since her son was born about 21 years ago. She had a  section. Does not sound like she uses any over-the-counter medications to help with her hemorrhoids.      Past Medical History        Past Medical History:   Diagnosis Date    Basal cell carcinoma (BCC) of skin of nose      Chronic pain of both knees      Class 3 severe obesity due to excess calories with serious comorbidity and body mass index (BMI) of 50.0 to 59.9 in adult [facility])      Diverticulitis      Essential hypertension      History of DVT in adulthood       superficial, right lower extremity, provoked by surgery    Hypothyroidism      Kidney stone      Mild intermittent asthma without complication      MELCHOR (obstructive sleep apnea)       on CPAP, does not follow with Sleep Medicine since moving to [place]    Osteoarthritis      PCOS (polycystic ovarian syndrome)      Type 2 diabetes mellitus with diabetic polyneuropathy, without long-term current use of insulin (HCC)              Past Surgical History         Past Surgical History:   Procedure Laterality Date    ANKLE SURGERY Bilateral       SECTION N/A       x 1    CYST REMOVAL N/A       chest    KIDNEY STONE REMOVAL UMBILICAL HERNIA REPAIR N/A       x 2            Family History         Family History   Problem Relation Age of Onset    Thyroid Disease Mother      High Blood Pressure Mother      Diabetes Mother      Depression Mother      Osteoporosis Mother      Obesity Mother      Stroke Mother      Glaucoma Mother      Heart Attack Father 43    Drug Abuse Sister           methamphetamine    Heart Disease Brother      Gout Brother      Diabetes Maternal Grandmother      Alcohol Abuse Maternal Grandfather      Lung Cancer Maternal Grandfather      Stroke Paternal Grandmother      Heart Attack Paternal Grandfather      Pancreatic Cancer Maternal Uncle      Prostate Cancer Paternal Uncle              No Known Allergies     Social History            Tobacco Use    Smoking status: Never    Smokeless tobacco: Never   Vaping Use    Vaping Use: Never used   Substance Use Topics    Alcohol use: Not Currently       Comment: rare    Drug use: Never         Current Facility-Administered Medications          Current Outpatient Medications   Medication Sig Dispense Refill    traMADol (ULTRAM) 50 MG tablet Take 1 tablet by mouth 3 times daily as needed for Pain for up to 30 days. 90 tablet 0    Multiple Vitamins-Minerals (PRESERVISION AREDS PO) Take by mouth        lisinopril (PRINIVIL;ZESTRIL) 5 MG tablet Take 1 tablet by mouth in the morning. 30 tablet 2    gabapentin (NEURONTIN) 100 MG capsule Take 2 capsules by mouth at bedtime for 180 days.  Intended supply: 90 days 180 capsule 1    levothyroxine (SYNTHROID) 75 MCG tablet Take 1 tablet by mouth Daily 90 tablet 2    celecoxib (CELEBREX) 200 MG capsule Take 1 capsule by mouth 2 times daily as needed for Pain 180 capsule 2    Handicap Placard MISC by Does not apply route DURATION 5 YEARS 1 each 0    CINNAMON PO Take by mouth        TURMERIC PO Take by mouth        Calcium Citrate-Vitamin D (CALCIUM + D PO) Take by mouth        Ferrous Sulfate (IRON PO) Take by mouth        b complex vitamins capsule Take 1 capsule by mouth daily        ZINC PO Take by mouth        Ascorbic Acid (VITAMIN C PO) Take by mouth        Omega-3 Fatty Acids (FISH OIL PO) Take by mouth          No current facility-administered medications for this visit. Review of Systems   Constitutional: Negative. Negative for activity change, appetite change and unexpected weight change. HENT: Negative. Eyes: Negative. Respiratory: Negative. Negative for cough and shortness of breath. Cardiovascular: Negative. Negative for chest pain and leg swelling. Gastrointestinal:  Positive for anal bleeding. Negative for abdominal distention, abdominal pain, blood in stool, constipation, diarrhea, nausea and vomiting. Endocrine: Negative. Genitourinary: Negative. Musculoskeletal: Negative. Negative for arthralgias, back pain, gait problem, joint swelling and myalgias. Skin: Negative. Allergic/Immunologic: Negative. Neurological: Negative. Negative for dizziness, weakness and headaches. Hematological: Negative. Psychiatric/Behavioral: Negative. Negative for confusion, decreased concentration and sleep disturbance. /63 (Site: Left Upper Arm, Position: Sitting, Cuff Size: Large Adult)   Pulse 86   Resp 16   Ht 5' 9\" (1.753 m)   Wt (!) 327 lb (148.3 kg)   SpO2 97%   BMI 48.29 kg/m²   Physical Exam  Constitutional:       Appearance: Normal appearance. She is obese. HENT:      Head: Normocephalic and atraumatic. Nose: Nose normal.      Mouth/Throat:      Mouth: Mucous membranes are moist.      Pharynx: Oropharynx is clear. Eyes:      Extraocular Movements: Extraocular movements intact. Pupils: Pupils are equal, round, and reactive to light. Cardiovascular:      Rate and Rhythm: Normal rate and regular rhythm. Pulses: Normal pulses. Heart sounds: Normal heart sounds.    Pulmonary:      Effort: Pulmonary effort is normal.      Breath sounds: Normal breath sounds. Abdominal:      General: There is no distension. Palpations: Abdomen is soft. Tenderness: There is no abdominal tenderness. Genitourinary:     Rectum: Internal hemorrhoid present. Comments: Multiple perianal skin tags  Musculoskeletal:         General: No tenderness or signs of injury. Cervical back: Normal range of motion and neck supple. Skin:     General: Skin is warm and dry. Neurological:      General: No focal deficit present. Mental Status: She is alert and oriented to person, place, and time. Psychiatric:         Mood and Affect: Mood normal.         Behavior: Behavior normal.         Thought Content: Thought content normal.         Judgment: Judgment normal.            ASSESSMENT/PLAN:  Age as a risk factor for colon cancer-- plan for colonoscopy  Grade 2 internal hemorrhoids and external skin tags--discussed hemorrhoidectomy versus in office banding with patient. She will consider in office banding once colonoscopy is completed. Prep: Clenpiq     Colonoscopy. The patient was explained the risks/benefits/alternatives/expected outcomes of the procedure. The patient was explained the risks of the procedure, including, but not limited to, the risk of reaction to the anesthesia medicine and the risk of perforation requiring further surgery. The patient was informed that they may require biopsy or polypectomy. These procedures may increase the risk of complication. All questions were answered. The patient verbalized understanding and agreed to proceed.      Karla Reno MD, MSc, FACS  8/8/2022  1:59 PM

## 2022-09-14 ENCOUNTER — ANESTHESIA (OUTPATIENT)
Dept: ENDOSCOPY | Age: 52
End: 2022-09-14
Payer: COMMERCIAL

## 2022-09-14 ENCOUNTER — ANESTHESIA EVENT (OUTPATIENT)
Dept: ENDOSCOPY | Age: 52
End: 2022-09-14
Payer: COMMERCIAL

## 2022-09-14 ENCOUNTER — HOSPITAL ENCOUNTER (OUTPATIENT)
Age: 52
Setting detail: OUTPATIENT SURGERY
Discharge: HOME | End: 2022-09-14
Attending: SURGERY | Admitting: SURGERY
Payer: COMMERCIAL

## 2022-09-14 VITALS
OXYGEN SATURATION: 92 % | DIASTOLIC BLOOD PRESSURE: 68 MMHG | TEMPERATURE: 97.8 F | HEIGHT: 69 IN | WEIGHT: 293 LBS | SYSTOLIC BLOOD PRESSURE: 116 MMHG | RESPIRATION RATE: 20 BRPM | HEART RATE: 78 BPM | BODY MASS INDEX: 43.4 KG/M2

## 2022-09-14 DIAGNOSIS — Z01.812 PRE-OPERATIVE LABORATORY EXAMINATION: Primary | ICD-10-CM

## 2022-09-14 DIAGNOSIS — Z12.11 SCREEN FOR COLON CANCER: ICD-10-CM

## 2022-09-14 PROBLEM — K63.5 COLON POLYPS: Status: ACTIVE | Noted: 2022-09-14

## 2022-09-14 LAB — METER GLUCOSE: 129 MG/DL (ref 74–99)

## 2022-09-14 PROCEDURE — 6360000002 HC RX W HCPCS: Performed by: NURSE ANESTHETIST, CERTIFIED REGISTERED

## 2022-09-14 PROCEDURE — 45380 COLONOSCOPY AND BIOPSY: CPT | Performed by: SURGERY

## 2022-09-14 PROCEDURE — 88305 TISSUE EXAM BY PATHOLOGIST: CPT | Performed by: NURSE ANESTHETIST, CERTIFIED REGISTERED

## 2022-09-14 PROCEDURE — 3700000000 HC ANESTHESIA ATTENDED CARE: Performed by: SURGERY

## 2022-09-14 PROCEDURE — 7100000011 HC PHASE II RECOVERY - ADDTL 15 MIN: Performed by: SURGERY

## 2022-09-14 PROCEDURE — 45385 COLONOSCOPY W/LESION REMOVAL: CPT | Performed by: SURGERY

## 2022-09-14 PROCEDURE — 88305 TISSUE EXAM BY PATHOLOGIST: CPT

## 2022-09-14 PROCEDURE — 7100000010 HC PHASE II RECOVERY - FIRST 15 MIN: Performed by: SURGERY

## 2022-09-14 PROCEDURE — 2580000003 HC RX 258: Performed by: NURSE ANESTHETIST, CERTIFIED REGISTERED

## 2022-09-14 PROCEDURE — 2709999900 HC NON-CHARGEABLE SUPPLY: Performed by: SURGERY

## 2022-09-14 PROCEDURE — 82962 GLUCOSE BLOOD TEST: CPT

## 2022-09-14 PROCEDURE — 3609010600 HC COLONOSCOPY POLYPECTOMY SNARE/COLD BIOPSY: Performed by: SURGERY

## 2022-09-14 PROCEDURE — 2580000003 HC RX 258: Performed by: SURGERY

## 2022-09-14 PROCEDURE — 3700000001 HC ADD 15 MINUTES (ANESTHESIA): Performed by: SURGERY

## 2022-09-14 RX ORDER — SODIUM CHLORIDE 0.9 % (FLUSH) 0.9 %
5-40 SYRINGE (ML) INJECTION EVERY 12 HOURS SCHEDULED
Status: DISCONTINUED | OUTPATIENT
Start: 2022-09-14 | End: 2022-09-14 | Stop reason: HOSPADM

## 2022-09-14 RX ORDER — PROPOFOL 10 MG/ML
INJECTION, EMULSION INTRAVENOUS PRN
Status: DISCONTINUED | OUTPATIENT
Start: 2022-09-14 | End: 2022-09-14 | Stop reason: SDUPTHER

## 2022-09-14 RX ORDER — SODIUM CHLORIDE 0.9 % (FLUSH) 0.9 %
5-40 SYRINGE (ML) INJECTION PRN
Status: DISCONTINUED | OUTPATIENT
Start: 2022-09-14 | End: 2022-09-14 | Stop reason: HOSPADM

## 2022-09-14 RX ORDER — SODIUM CHLORIDE 9 MG/ML
25 INJECTION, SOLUTION INTRAVENOUS PRN
Status: DISCONTINUED | OUTPATIENT
Start: 2022-09-14 | End: 2022-09-14 | Stop reason: HOSPADM

## 2022-09-14 RX ORDER — SODIUM CHLORIDE 9 MG/ML
INJECTION, SOLUTION INTRAVENOUS CONTINUOUS PRN
Status: DISCONTINUED | OUTPATIENT
Start: 2022-09-14 | End: 2022-09-14 | Stop reason: SDUPTHER

## 2022-09-14 RX ORDER — SODIUM CHLORIDE 9 MG/ML
INJECTION, SOLUTION INTRAVENOUS CONTINUOUS
Status: DISCONTINUED | OUTPATIENT
Start: 2022-09-14 | End: 2022-09-14 | Stop reason: HOSPADM

## 2022-09-14 RX ADMIN — SODIUM CHLORIDE: 9 INJECTION, SOLUTION INTRAVENOUS at 09:49

## 2022-09-14 RX ADMIN — SODIUM CHLORIDE 25 ML: 9 INJECTION, SOLUTION INTRAVENOUS at 09:35

## 2022-09-14 RX ADMIN — PROPOFOL 650 MG: 10 INJECTION, EMULSION INTRAVENOUS at 10:21

## 2022-09-14 ASSESSMENT — PAIN DESCRIPTION - LOCATION
LOCATION: SHOULDER;BACK
LOCATION: BACK;SHOULDER

## 2022-09-14 ASSESSMENT — PAIN SCALES - GENERAL
PAINLEVEL_OUTOF10: 0
PAINLEVEL_OUTOF10: 8

## 2022-09-14 ASSESSMENT — PAIN - FUNCTIONAL ASSESSMENT: PAIN_FUNCTIONAL_ASSESSMENT: NONE - DENIES PAIN

## 2022-09-14 ASSESSMENT — PAIN DESCRIPTION - DESCRIPTORS: DESCRIPTORS: ACHING

## 2022-09-14 ASSESSMENT — PAIN DESCRIPTION - PAIN TYPE
TYPE: CHRONIC PAIN
TYPE: CHRONIC PAIN

## 2022-09-14 NOTE — ANESTHESIA POSTPROCEDURE EVALUATION
Department of Anesthesiology  Postprocedure Note    Patient: Terry Beard  MRN: [id number]  YOB: 1970  Date of evaluation: 9/14/2022      Procedure Summary     Date: 09/14/22 Room / Location: [place] [address] / [facility]    Anesthesia Start: 6559 Anesthesia Stop: 4980    Procedure: COLONOSCOPY POLYPECTOMY SNARE/COLD BIOPSY Diagnosis:       Screen for colon cancer      (SCREENING)    Surgeons: Shmuel Fisher MD Responsible Provider: Nathan Noguera MD    Anesthesia Type: MAC ASA Status: 3          Anesthesia Type: No value filed.     Nadine Phase I: Nadine Score: 10    Nadine Phase II:        Anesthesia Post Evaluation    Patient location during evaluation: PACU  Patient participation: complete - patient participated  Level of consciousness: awake  Pain score: 3  Airway patency: patent  Nausea & Vomiting: no nausea and no vomiting  Complications: no  Cardiovascular status: blood pressure returned to baseline  Respiratory status: acceptable  Hydration status: euvolemic

## 2022-09-14 NOTE — ANESTHESIA PRE PROCEDURE
Department of Anesthesiology  Preprocedure Note       Name:  Chelsey Mejía   Age:  45 y.o.  :  1970                                          MRN:  [id number]         Date:  2022      Surgeon: Lien Calvillo):  Danyel Pemberton MD    Procedure: Procedure(s):  COLORECTAL CANCER SCREENING, NOT HIGH RISK    Medications prior to admission:   Prior to Admission medications    Medication Sig Start Date End Date Taking? Authorizing Provider   Potassium 99 MG TABS Take by mouth at bedtime   Yes Historical Provider, MD   traMADol (ULTRAM) 50 MG tablet Take 1 tablet by mouth every 8 hours as needed for Pain for up to 30 days. 22  Jayme Hazard, DO   levothyroxine (SYNTHROID) 75 MCG tablet Take 1 tablet by mouth Daily 22   Jayme Hazard, DO   Multiple Vitamins-Minerals (PRESERVISION AREDS PO) Take by mouth    Historical Provider, MD   lisinopril (PRINIVIL;ZESTRIL) 5 MG tablet Take 1 tablet by mouth in the morning. 7/15/22   [facility] Chun, DO   gabapentin (NEURONTIN) 100 MG capsule Take 2 capsules by mouth at bedtime for 180 days.  Intended supply: 90 days 6/6/22 12/3/22  Leonidas Russell MD   celecoxib (CELEBREX) 200 MG capsule Take 1 capsule by mouth 2 times daily as needed for Pain 22   Margoth King DO   Handicap Placard MISC by Does not apply route DURATION 5 YEARS 21   Margoth King DO   CINNAMON PO Take by mouth    Historical Provider, MD   TURMERIC PO Take by mouth    Historical Provider, MD   Calcium Citrate-Vitamin D (CALCIUM + D PO) Take by mouth    Historical Provider, MD   Ferrous Sulfate (IRON PO) Take by mouth    Historical Provider, MD   b complex vitamins capsule Take 1 capsule by mouth daily    Historical Provider, MD   ZINC PO Take by mouth    Historical Provider, MD   Ascorbic Acid (VITAMIN C PO) Take by mouth    Historical Provider, MD   Omega-3 Fatty Acids (FISH OIL PO) Take by mouth    Historical Provider, MD       Current medications:    Current Facility-Administered Medications   Medication Dose Route Frequency Provider Last Rate Last Admin    0.9 % sodium chloride infusion   IntraVENous Continuous Tiny Kim MD        sodium chloride flush 0.9 % injection 5-40 mL  5-40 mL IntraVENous 2 times per day Tiny Kim MD        sodium chloride flush 0.9 % injection 5-40 mL  5-40 mL IntraVENous PRN Tiny Kim MD        0.9 % sodium chloride infusion  25 mL IntraVENous PRN Tiny Kim MD           Allergies:  No Known Allergies    Problem List:    Patient Active Problem List   Diagnosis Code    Hypothyroidism E03.9    Type 2 diabetes mellitus with diabetic polyneuropathy, without long-term current use of insulin ([facility] E11.42    Primary osteoarthritis of both knees M17.0    Polyarthritis with positive rheumatoid factor (HCC) M05.80    Class 3 severe obesity due to excess calories with serious comorbidity and body mass index (BMI) of 45.0 to 49.9 in adult ([facility] E66.01, Z68.42    Burning sensation of feet R20.8    Mild intermittent asthma without complication A50.13    Chronic pain of both knees M25.561, M25.562, G89.29    MELCHOR (obstructive sleep apnea) G47.33    Diverticulitis K57.92    Osteoarthritis M19.90    Basal cell carcinoma (BCC) of skin of nose C44.311    Essential hypertension I10    History of DVT in adulthood Z86.718    Kidney stone N20.0    PCOS (polycystic ovarian syndrome) E28.2       Past Medical History:        Diagnosis Date    Basal cell carcinoma (BCC) of skin of nose     Chronic pain of both knees     Class 3 severe obesity due to excess calories with serious comorbidity and body mass index (BMI) of 50.0 to 59.9 in adult ([facility]     Diverticulitis     Essential hypertension     History of DVT in adulthood     superficial, right lower extremity, provoked by surgery    Hypothyroidism     Kidney stone     Mild intermittent asthma without complication     MELCHOR (obstructive sleep apnea)     on CPAP, does not follow with Sleep Medicine since moving to [address] [place] [address] PCOS (polycystic ovarian syndrome)     Type 2 diabetes mellitus with diabetic polyneuropathy, without long-term current use of insulin (HCC)        Past Surgical History:        Procedure Laterality Date    ANKLE SURGERY Bilateral      SECTION N/A     x 1    CYST REMOVAL N/A     chest    KIDNEY STONE REMOVAL      UMBILICAL HERNIA REPAIR N/A     x 2       Social History:    Social History     Tobacco Use    Smoking status: Never    Smokeless tobacco: Never   Substance Use Topics    Alcohol use: Not Currently     Comment: rare                                Counseling given: Not Answered      Vital Signs (Current):   Vitals:    22 1135 22 0914   BP:  (!) 120/57   Pulse:  81   Resp:  18   Temp:  36.4 °C (97.6 °F)   TempSrc:  Temporal   SpO2:  97%   Weight: (!) 326 lb (147.9 kg) (!) 326 lb (147.9 kg)   Height: 5' 9\" (1.753 m) 5' 9\" (1.753 m)                                              BP Readings from Last 3 Encounters:   22 (!) 120/57   22 138/74   22 133/74       NPO Status: Time of last liquid consumption: 0230 (sip w meds)                        Time of last solid consumption: 2200                        Date of last liquid consumption: 22                        Date of last solid food consumption: 22    BMI:   Wt Readings from Last 3 Encounters:   22 (!) 326 lb (147.9 kg)   22 (!) 328 lb (148.8 kg)   22 (!) 331 lb 9.6 oz (150.4 kg)     Body mass index is 48.14 kg/m².     CBC:   Lab Results   Component Value Date/Time    WBC 6.8 2022 10:01 AM    RBC 4.32 2022 10:01 AM    HGB 12.2 2022 10:01 AM    HCT 38.2 2022 10:01 AM    MCV 88.4 2022 10:01 AM    RDW 13.2 2022 10:01 AM     2022 10:01 AM       CMP:   Lab Results   Component Value Date/Time     2022 10:01 AM    K 4.2 2022 10:01 AM

## 2022-09-14 NOTE — OP NOTE
[facility]  PROCEDURE NOTE    DATE OF PROCEDURE: 9/14/2022    SURGEON: Ermias Valdez MD    ASSISTANT: None    PREOPERATIVE DIAGNOSIS: Low risk colorectal cancer screening    POSTOPERATIVE DIAGNOSIS: Diverticulosis coli, sigmoid colon polyp, rectal polyp, external skin tags    OPERATION: Total colonoscopy cold snare polypectomy and rectal polyp biopsy    ANESTHESIA: Local monitored anesthesia. ESTIMATED BLOOD LOSS (ml): less than 50     COMPLICATIONS: None    SPECIMENS:  Was Obtained: Sigmoid colon polyp and rectal biopsy    HISTORY: The patient is a 45y.o. year old female with history of above preop diagnosis. I recommended colonoscopy with possible biopsy or polypectomy and I explained the risk, benefits, expected outcome, and alternatives to the procedure. Risks included but are not limited to bleeding, infection, respiratory distress, hypotension, and perforation of the colon. The patient understands and is in agreement. PROCEDURE: The patient was given IV conscious sedation per anesthesia. The patient was given supplemental oxygen by nasal cannula. The colonoscope was inserted per rectum and advanced under direct vision to the cecum without difficulty. The prep was excellent so exam was adequate. [place] bowel prep scale: 3+3+3 = 9    FINDINGS:  Cecum/Ascending colon: Appendiceal orifice identified. Convergence of tinea identified. Ileocecal valve identified. Did not intubate terminal ileum. Did not retroflex. Diverticulosis present    Transverse colon: Diverticulosis present    Descending/Sigmoid colon: Diverticulosis medium to large in size many, 8 mm polyp removed with cold snare from sigmoid colon    Rectum/Anus: examined in normal and retroflexed positions and was 3 to 4 mm low rectal polyp attempted removed with cold snare but ultimately removed with a cold forceps. Multiple large external skin tags    The colon was decompressed and the scope was removed. The withdraw time was approximately 14 minutes. The patient tolerated the procedure well.      ASSESSMENT/PLAN:   Colon polyps--follow-up pathology  Recommend follow up colonoscopy in 10 years depending on pathology    Electronically signed by Tosin Donohue MD on 9/14/22 at 10:27 AM EDT

## 2022-09-14 NOTE — DISCHARGE INSTRUCTIONS
Colon polyps--follow-up pathology  Recommend follow up colonoscopy in 10 years depending on pathology

## 2022-09-14 NOTE — INTERVAL H&P NOTE
Update History & Physical    The patient's History and Physical of August 8, 2022 was reviewed with the patient and I examined the patient. There was no change. The surgical site was confirmed by the patient and me. Plan: The risks, benefits, expected outcome, and alternative to the recommended procedure have been discussed with the patient. Patient understands and wants to proceed with the procedure.      Electronically signed by Elvin Amanda MD on 9/14/2022 at 9:25 AM

## 2022-09-16 ENCOUNTER — TELEPHONE (OUTPATIENT)
Dept: SURGERY | Age: 52
End: 2022-09-16

## 2022-09-28 DIAGNOSIS — G89.29 BILATERAL CHRONIC KNEE PAIN: ICD-10-CM

## 2022-09-28 DIAGNOSIS — M25.561 BILATERAL CHRONIC KNEE PAIN: ICD-10-CM

## 2022-09-28 DIAGNOSIS — M25.562 BILATERAL CHRONIC KNEE PAIN: ICD-10-CM

## 2022-09-28 RX ORDER — TRAMADOL HYDROCHLORIDE 50 MG/1
50 TABLET ORAL EVERY 8 HOURS PRN
Qty: 90 TABLET | Refills: 0 | Status: SHIPPED
Start: 2022-10-02 | End: 2022-10-31

## 2022-10-15 DIAGNOSIS — I10 ESSENTIAL HYPERTENSION: ICD-10-CM

## 2022-10-17 RX ORDER — LISINOPRIL 5 MG/1
TABLET ORAL
Qty: 90 TABLET | Refills: 0 | Status: SHIPPED
Start: 2022-10-17 | End: 2022-11-29

## 2022-10-30 DIAGNOSIS — G89.29 BILATERAL CHRONIC KNEE PAIN: ICD-10-CM

## 2022-10-30 DIAGNOSIS — M25.562 BILATERAL CHRONIC KNEE PAIN: ICD-10-CM

## 2022-10-30 DIAGNOSIS — M25.561 BILATERAL CHRONIC KNEE PAIN: ICD-10-CM

## 2022-10-31 RX ORDER — TRAMADOL HYDROCHLORIDE 50 MG/1
TABLET ORAL
Qty: 90 TABLET | Refills: 0 | Status: SHIPPED
Start: 2022-11-02 | End: 2022-11-28

## 2022-11-08 ENCOUNTER — OFFICE VISIT (OUTPATIENT)
Dept: BARIATRICS/WEIGHT MGMT | Age: 52
End: 2022-11-08
Payer: COMMERCIAL

## 2022-11-08 VITALS
HEIGHT: 69 IN | WEIGHT: 293 LBS | BODY MASS INDEX: 43.4 KG/M2 | SYSTOLIC BLOOD PRESSURE: 111 MMHG | TEMPERATURE: 97.3 F | HEART RATE: 70 BPM | DIASTOLIC BLOOD PRESSURE: 58 MMHG

## 2022-11-08 DIAGNOSIS — M25.562 CHRONIC PAIN OF BOTH KNEES: Primary | ICD-10-CM

## 2022-11-08 DIAGNOSIS — E66.01 CLASS 3 SEVERE OBESITY DUE TO EXCESS CALORIES WITH SERIOUS COMORBIDITY AND BODY MASS INDEX (BMI) OF 45.0 TO 49.9 IN ADULT (HCC): ICD-10-CM

## 2022-11-08 DIAGNOSIS — G89.29 CHRONIC PAIN OF BOTH KNEES: Primary | ICD-10-CM

## 2022-11-08 DIAGNOSIS — M25.561 CHRONIC PAIN OF BOTH KNEES: Primary | ICD-10-CM

## 2022-11-08 PROBLEM — E66.813 CLASS 3 SEVERE OBESITY DUE TO EXCESS CALORIES WITH SERIOUS COMORBIDITY AND BODY MASS INDEX (BMI) OF 45.0 TO 49.9 IN ADULT: Status: RESOLVED | Noted: 2021-11-11 | Resolved: 2022-11-08

## 2022-11-08 PROCEDURE — G8428 CUR MEDS NOT DOCUMENT: HCPCS | Performed by: INTERNAL MEDICINE

## 2022-11-08 PROCEDURE — G8417 CALC BMI ABV UP PARAM F/U: HCPCS | Performed by: INTERNAL MEDICINE

## 2022-11-08 PROCEDURE — G8484 FLU IMMUNIZE NO ADMIN: HCPCS | Performed by: INTERNAL MEDICINE

## 2022-11-08 PROCEDURE — 1036F TOBACCO NON-USER: CPT | Performed by: INTERNAL MEDICINE

## 2022-11-08 PROCEDURE — 99214 OFFICE O/P EST MOD 30 MIN: CPT | Performed by: INTERNAL MEDICINE

## 2022-11-08 PROCEDURE — 3078F DIAST BP <80 MM HG: CPT | Performed by: INTERNAL MEDICINE

## 2022-11-08 PROCEDURE — 3017F COLORECTAL CA SCREEN DOC REV: CPT | Performed by: INTERNAL MEDICINE

## 2022-11-08 PROCEDURE — 3074F SYST BP LT 130 MM HG: CPT | Performed by: INTERNAL MEDICINE

## 2022-11-08 PROCEDURE — 99211 OFF/OP EST MAY X REQ PHY/QHP: CPT

## 2022-11-08 NOTE — PATIENT INSTRUCTIONS
Rules:  Count every calorie every day (you can resume using nutritionix christopher)  Limit sweets to one day per month  Limit chips/crackers/pretzels/nuts/popcorn to 100 martir/day  Eliminate all sugar sweetened beverages (including fruit juice)  Limit restaurants (including fast food and food from a convenience store) to one time every two weeks while in town     Requirements:  Make sure protein intake is at least 75 grams per day   Make sure that fiber intake is at least 25 grams per day  Take one multivitamin every day     Targets:  Limit calorie intake to 1400 calories/day  Walk 30 minutes daily or equivalent (210 min/week)  Avoid eating 2 hours within bedtime. Tips:  Do not eat outside of the dining room or the kitchen  Do not eat while watching TV, videos, working on the computer or using a smart phone  Do not eat food out of a multi-serving bag or container. Follow up in 2 months.

## 2022-11-08 NOTE — PROGRESS NOTES
CC -   Follow up of: Chronic knee pain - arthritis - needs BMI <42 for TKA, weight gain. BACKGROUND -   Last visit: 8/29/22  First visit: 2/14/22    Obesity (all weight in lbs)  Began early adulthood  Initial BMI 55.50, Wt 375.8, Ht 69\"  HS Grad wt 230   Lowest   wt 230   Highest  wt 380 (2021 June)  Pattern of wt gain: gradual  Wt change past yr: -5 lbs  Most wt lost: 40 lbs  Other diets attempted: Jayesh Phoenix. Did a lot of exercise when young    Desire to lose weight: 8/10  Problem posed by appetite: 4-5/10    Initial Diet:    Number of meals per day - 3    Number of snacks per day - 0-1 (about once a week)    Meal volume - 12\" plate,  occasional seconds    Fast food/convenience store - 0-1x/week    Restaurants (not fast food) - 0x/week (included in fast food)   Sweets - 3d/week (pastries, pumpkin bread, fruity chewy)   Chips - 0-1d/week   Crackers/pretzels - 0-1d/week   Nuts - 0d/week (diverticulitis)   Peanut Butter - 1d/week   Popcorn - 0d/week   Dried fruit - 0d/week   Whole fruit - 7d/week (bananas, apples, clementines, pineapples grapes)   Breakfast cereal - 0-1d/week   Granola/Protein/Energy bar - 0d/week   Sugar sweetened beverages - sweet tea ([place] 8-16 oz/day). Coffee with creamer (75 Eric/tbsp) - 3-4 tbsp/cup 3-4x/wk + 1 tbsp sugar. No soda/pops. Protein - No supplements   Fiber - No supplements (used to have fiber one bars)    Breakfast: cottage cheese with cherry tomatoes etc, or toast with peanut butter or cheese etc.  Lunch: TV dinner (pot pie, chinese etc), or yogurt + slice of cheese etc.  Dinner: varies, baked potatoes, roast and broccoli. Had honey fried chicken breast, 2 eggs, country fried potatoes served with biscuit (half of it). Initial Exercise:    Gym membership - no    Walking - no    Running - no    Resistance - no    Aerobic class - no. Has total body vibration machine.         Initial Sleep: Bedtime: 10-midnight, wake up time: 7-8 am - usually rested with CPAP + o2 2l/min, daytime naps: no.    ______________________    [facility] -  Past Medical History:   Diagnosis Date    Basal cell carcinoma (BCC) of skin of nose     Chronic pain of both knees     Class 3 severe obesity due to excess calories with serious comorbidity and body mass index (BMI) of 50.0 to 59.9 in adult [facility])     Diverticulitis     Diverticulosis 2022    Essential hypertension     History of DVT in adulthood     superficial, right lower extremity, provoked by surgery    Hypothyroidism     Kidney stone     Mild intermittent asthma without complication     MELCHOR (obstructive sleep apnea)     on CPAP, does not follow with Sleep Medicine since moving to [place]    Osteoarthritis     PCOS (polycystic ovarian syndrome)     Tubular adenoma of colon 2022    Type 2 diabetes mellitus with diabetic polyneuropathy, without long-term current use of insulin ([facility] [address]    Unclear if patient has Sjogren's disease - was ruled out. RA - worked up for and ruled out. Past Surgical History:   Procedure Laterality Date    ANKLE SURGERY Bilateral      SECTION N/A     x 1    COLONOSCOPY N/A 2022    COLONOSCOPY POLYPECTOMY SNARE/COLD BIOPSY performed by Maylin Jacobs MD at [facility] N/A     chest    KIDNEY STONE REMOVAL      UMBILICAL HERNIA REPAIR N/A     x 2     Prior to Admission medications    Medication Sig Start Date End Date Taking? Authorizing Provider   traMADol (ULTRAM) 50 MG tablet TAKE ONE TABLET BY MOUTH EVERY 8 HOURS AS NEEDED FOR PAIN FOR UP TO 30 DAYS.  REDUCE DOSES TAKEN AS PAIN BECOMES MANAGEABLE 22  Ashok Howard, DO   lisinopril (PRINIVIL;ZESTRIL) 5 MG tablet TAKE ONE TABLET BY MOUTH IN THE MORNING 10/17/22   Michael Mccollum, DO   Potassium 99 MG TABS Take by mouth at bedtime    Historical Provider, MD   levothyroxine (SYNTHROID) 75 MCG tablet Take 1 tablet by mouth Daily 22   Ashok Howard, DO   Multiple Vitamins-Minerals (PRESERVISION AREDS PO) Take by mouth Historical Provider, MD   gabapentin (NEURONTIN) 100 MG capsule Take 2 capsules by mouth at bedtime for 180 days. Intended supply: 90 days 6/6/22 12/3/22  Ar Russell MD   celecoxib (CELEBREX) 200 MG capsule Take 1 capsule by mouth 2 times daily as needed for Pain 2/1/22   Pako Chakraborty DO   Handicap Placard MISC by Does not apply route DURATION 5 YEARS 11/23/21   Pako Chakraborty DO   CINNAMON PO Take by mouth    Historical Provider, MD   TURMERIC PO Take by mouth    Historical Provider, MD   Calcium Citrate-Vitamin D (CALCIUM + D PO) Take by mouth    Historical Provider, MD   Ferrous Sulfate (IRON PO) Take by mouth    Historical Provider, MD   b complex vitamins capsule Take 1 capsule by mouth daily    Historical Provider, MD   ZINC PO Take by mouth    Historical Provider, MD   Ascorbic Acid (VITAMIN C PO) Take by mouth    Historical Provider, MD   Omega-3 Fatty Acids (FISH OIL PO) Take by mouth    Historical Provider, MD     Allergies: No Known Allergies, environmental allergies. Family history: DM: mother, Heart disease: father (passed away with MI at 43 yrs of age), father side. Social history: smoking: never ; Alcohol: no , work: sedentary work. 3 steps and has difficulty climbing up and down. ROS -  Card - no CP. GI - no N/V, has loose BM (watery, unkown why but had workup) once to twice a day. PE -  Gen : BP (!) 111/58 (Site: Left Upper Arm, Position: Sitting, Cuff Size: Large Adult)   Pulse 70   Temp 97.3 °F (36.3 °C) (Temporal)   Ht 5' 9\" (1.753 m)   Wt (!) 323 lb 9.6 oz (146.8 kg)   BMI 47.79 kg/m²    WN, WD, NAD  Lung: Nml resp effort, CTA B/L at present  Heart:  RRR w/o MGR, trace - 1+ LE pitting edema b/l  Psych: Normal mood   Full affect  Neuro: Moves all ext well  ______________________    HISTORY & ASSESSMENT/PLAN -     Problem 1  - Bilateral knee pain, h/o ankle hardwares   HPI   - Ongoing, gradually progressing which pt feels like due to weight gain.  Was told it is bone on bone, will need BMI<42 for surgery (<284 lbs)  Assessment  - Uncontrolled, weight gain is likely primary contributor   Plan   - currently is on Celebrex and tramadol prn (takes tid) that seems to be helping. Weight reduction can help not only with pain but will help her undergo surgery as well. Weight reduction per plan below    Problem 2  - Obesity   HPI   - See above Background for description    Weight  Date    375.8 lbs 2/14/22    360.4  3/14/22    347.0  4/25/22    336.4  6/6/22    328.0  8/29/22    323.6  11/8/22    Total weight change to date: -52.2 lbs. Average daily energy variance:   2/14/2022 - 3/14/2022: -13.8 lbs (45536 Eric)/27 days = -1789 Eric/day deficit (after correction of 1.6 lbs for water weight and starting the day after last visit). 3/14/2022 - 4/25/2022: -13.4 lbs (30901 Eric)/41 days = -1144 Eric/day deficit. 4/25/2022 - 6/6/2022: -10.6 lbs (93901 Eric)/41 days = -905 Eric/day deficit. 6/6/2022 - 8/29/2022: -8.4 lbs (59839 Eric)/84 days = -350 Eric/day deficit. DEN = 2347 Eric/d    Update:  Calorie monitoring: calorie conscious (feels she may not be measuring as much)  Sweets: 2-3d/month  SSB: linwood tea on Saturday 1x/wk  Restaurant food: 6x/month  Protein: egg substitute, meat  Fiber: has backed off on fiber d/t difficulty balancing BM (digestive advantage)  Water: cirkul (still not getting abt 96 oz/day)  Activities: Walking trail about 1x/wk, ([place] not been [place] [facility] lately)    Assessment  - improving    Plan   - She is doing well with current plan and would like to continue. She does feel it has been slowing down, so wants to resume counting calories again. Does not feel the need for a appetite suppressant.  Planned as follows  Patient Instructions   Rules:  Count every calorie every day (you can resume using nutritionix christopher)  Limit sweets to one day per month  Limit chips/crackers/pretzels/nuts/popcorn to 100 eric/day  Eliminate all sugar sweetened beverages (including fruit juice)  Limit restaurants (including fast food and food from a convenience store) to one time every two weeks while in town     Requirements:  Make sure protein intake is at least 75 grams per day   Make sure that fiber intake is at least 25 grams per day  Take one multivitamin every day     Targets:  Limit calorie intake to 1400 calories/day  Walk 30 minutes daily or equivalent (210 min/week)  Avoid eating 2 hours within bedtime. Tips:  Do not eat outside of the dining room or the kitchen  Do not eat while watching TV, videos, working on the computer or using a smart phone  Do not eat food out of a multi-serving bag or container. Follow up in 2 months. Total time spent on encounter: 38 min. Robert Bobo MD  Internal Medicine/Obesity Medicine  11/8/2022.

## 2022-11-21 RX ORDER — CELECOXIB 200 MG/1
CAPSULE ORAL
Qty: 180 CAPSULE | Refills: 0 | OUTPATIENT
Start: 2022-11-21

## 2022-11-25 ENCOUNTER — HOSPITAL ENCOUNTER (OUTPATIENT)
Age: 52
Discharge: HOME | End: 2022-11-25
Payer: COMMERCIAL

## 2022-11-25 DIAGNOSIS — E11.9 TYPE 2 DIABETES MELLITUS WITHOUT COMPLICATION, WITHOUT LONG-TERM CURRENT USE OF INSULIN (HCC): ICD-10-CM

## 2022-11-25 DIAGNOSIS — E03.9 ACQUIRED HYPOTHYROIDISM: ICD-10-CM

## 2022-11-25 LAB
ALBUMIN SERPL-MCNC: 4 G/DL (ref 3.5–5.2)
ALP BLD-CCNC: 56 U/L (ref 35–104)
ALT SERPL-CCNC: 11 U/L (ref 0–32)
ANION GAP SERPL CALCULATED.3IONS-SCNC: 10 MMOL/L (ref 7–16)
AST SERPL-CCNC: 13 U/L (ref 0–31)
BASOPHILS ABSOLUTE: 0.02 E9/L (ref 0–0.2)
BASOPHILS RELATIVE PERCENT: 0.3 % (ref 0–2)
BILIRUB SERPL-MCNC: 0.7 MG/DL (ref 0–1.2)
BUN BLDV-MCNC: 10 MG/DL (ref 6–20)
CALCIUM SERPL-MCNC: 9.7 MG/DL (ref 8.6–10.2)
CHLORIDE BLD-SCNC: 103 MMOL/L (ref 98–107)
CHOLESTEROL, TOTAL: 135 MG/DL (ref 0–199)
CO2: 28 MMOL/L (ref 22–29)
CREAT SERPL-MCNC: 0.7 MG/DL (ref 0.5–1)
EOSINOPHILS ABSOLUTE: 0.12 E9/L (ref 0.05–0.5)
EOSINOPHILS RELATIVE PERCENT: 1.6 % (ref 0–6)
GFR SERPL CREATININE-BSD FRML MDRD: >60 ML/MIN/1.73
GLUCOSE BLD-MCNC: 112 MG/DL (ref 74–99)
HBA1C MFR BLD: 5.6 % (ref 4–5.6)
HCT VFR BLD CALC: 38.2 % (ref 34–48)
HDLC SERPL-MCNC: 53 MG/DL
HEMOGLOBIN: 12.7 G/DL (ref 11.5–15.5)
IMMATURE GRANULOCYTES #: 0.02 E9/L
IMMATURE GRANULOCYTES %: 0.3 % (ref 0–5)
LDL CHOLESTEROL CALCULATED: 67 MG/DL (ref 0–99)
LYMPHOCYTES ABSOLUTE: 2.02 E9/L (ref 1.5–4)
LYMPHOCYTES RELATIVE PERCENT: 26.8 % (ref 20–42)
MCH RBC QN AUTO: 29.5 PG (ref 26–35)
MCHC RBC AUTO-ENTMCNC: 33.2 % (ref 32–34.5)
MCV RBC AUTO: 88.8 FL (ref 80–99.9)
MONOCYTES ABSOLUTE: 0.38 E9/L (ref 0.1–0.95)
MONOCYTES RELATIVE PERCENT: 5 % (ref 2–12)
NEUTROPHILS ABSOLUTE: 4.98 E9/L (ref 1.8–7.3)
NEUTROPHILS RELATIVE PERCENT: 66 % (ref 43–80)
PDW BLD-RTO: 13.5 FL (ref 11.5–15)
PLATELET # BLD: 225 E9/L (ref 130–450)
PMV BLD AUTO: 10.4 FL (ref 7–12)
POTASSIUM SERPL-SCNC: 4.3 MMOL/L (ref 3.5–5)
RBC # BLD: 4.3 E12/L (ref 3.5–5.5)
SODIUM BLD-SCNC: 141 MMOL/L (ref 132–146)
TOTAL PROTEIN: 7.1 G/DL (ref 6.4–8.3)
TRIGL SERPL-MCNC: 75 MG/DL (ref 0–149)
TSH SERPL DL<=0.05 MIU/L-ACNC: 1.27 UIU/ML (ref 0.27–4.2)
VLDLC SERPL CALC-MCNC: 15 MG/DL
WBC # BLD: 7.5 E9/L (ref 4.5–11.5)

## 2022-11-25 PROCEDURE — 85025 COMPLETE CBC W/AUTO DIFF WBC: CPT

## 2022-11-25 PROCEDURE — 36415 COLL VENOUS BLD VENIPUNCTURE: CPT

## 2022-11-25 PROCEDURE — 84443 ASSAY THYROID STIM HORMONE: CPT

## 2022-11-25 PROCEDURE — 83036 HEMOGLOBIN GLYCOSYLATED A1C: CPT

## 2022-11-25 PROCEDURE — 80053 COMPREHEN METABOLIC PANEL: CPT

## 2022-11-25 PROCEDURE — 80061 LIPID PANEL: CPT

## 2022-11-28 DIAGNOSIS — G89.29 BILATERAL CHRONIC KNEE PAIN: ICD-10-CM

## 2022-11-28 DIAGNOSIS — M25.562 BILATERAL CHRONIC KNEE PAIN: ICD-10-CM

## 2022-11-28 DIAGNOSIS — M25.561 BILATERAL CHRONIC KNEE PAIN: ICD-10-CM

## 2022-11-28 RX ORDER — TRAMADOL HYDROCHLORIDE 50 MG/1
50 TABLET ORAL EVERY 8 HOURS PRN
Qty: 90 TABLET | Refills: 0 | Status: SHIPPED | OUTPATIENT
Start: 2022-12-02 | End: 2023-01-01

## 2022-11-28 NOTE — TELEPHONE ENCOUNTER
Medication Refill Request    LOV 8/25/2022  NOV 11/29/2022    Lab Results   Component Value Date    CREATININE 0.7 11/25/2022

## 2022-11-29 ENCOUNTER — OFFICE VISIT (OUTPATIENT)
Dept: FAMILY MEDICINE CLINIC | Age: 52
End: 2022-11-29
Payer: COMMERCIAL

## 2022-11-29 VITALS
SYSTOLIC BLOOD PRESSURE: 100 MMHG | DIASTOLIC BLOOD PRESSURE: 70 MMHG | HEART RATE: 76 BPM | OXYGEN SATURATION: 98 % | BODY MASS INDEX: 47.55 KG/M2 | WEIGHT: 293 LBS | TEMPERATURE: 97.6 F

## 2022-11-29 DIAGNOSIS — G89.29 BILATERAL CHRONIC KNEE PAIN: ICD-10-CM

## 2022-11-29 DIAGNOSIS — M25.562 BILATERAL CHRONIC KNEE PAIN: ICD-10-CM

## 2022-11-29 DIAGNOSIS — M25.561 BILATERAL CHRONIC KNEE PAIN: ICD-10-CM

## 2022-11-29 DIAGNOSIS — Z23 FLU VACCINE NEED: ICD-10-CM

## 2022-11-29 DIAGNOSIS — E03.9 ACQUIRED HYPOTHYROIDISM: ICD-10-CM

## 2022-11-29 DIAGNOSIS — E11.40 TYPE 2 DIABETES MELLITUS WITH DIABETIC NEUROPATHY, WITHOUT LONG-TERM CURRENT USE OF INSULIN (HCC): Primary | ICD-10-CM

## 2022-11-29 PROCEDURE — G8427 DOCREV CUR MEDS BY ELIG CLIN: HCPCS | Performed by: FAMILY MEDICINE

## 2022-11-29 PROCEDURE — 3078F DIAST BP <80 MM HG: CPT | Performed by: FAMILY MEDICINE

## 2022-11-29 PROCEDURE — 1036F TOBACCO NON-USER: CPT | Performed by: FAMILY MEDICINE

## 2022-11-29 PROCEDURE — 99214 OFFICE O/P EST MOD 30 MIN: CPT | Performed by: FAMILY MEDICINE

## 2022-11-29 PROCEDURE — 90471 IMMUNIZATION ADMIN: CPT | Performed by: FAMILY MEDICINE

## 2022-11-29 PROCEDURE — 3044F HG A1C LEVEL LT 7.0%: CPT | Performed by: FAMILY MEDICINE

## 2022-11-29 PROCEDURE — 90674 CCIIV4 VAC NO PRSV 0.5 ML IM: CPT | Performed by: FAMILY MEDICINE

## 2022-11-29 PROCEDURE — G8417 CALC BMI ABV UP PARAM F/U: HCPCS | Performed by: FAMILY MEDICINE

## 2022-11-29 PROCEDURE — 3017F COLORECTAL CA SCREEN DOC REV: CPT | Performed by: FAMILY MEDICINE

## 2022-11-29 PROCEDURE — 2022F DILAT RTA XM EVC RTNOPTHY: CPT | Performed by: FAMILY MEDICINE

## 2022-11-29 PROCEDURE — 3074F SYST BP LT 130 MM HG: CPT | Performed by: FAMILY MEDICINE

## 2022-11-29 PROCEDURE — G8482 FLU IMMUNIZE ORDER/ADMIN: HCPCS | Performed by: FAMILY MEDICINE

## 2022-11-29 RX ORDER — TRAMADOL HYDROCHLORIDE 50 MG/1
50 TABLET ORAL EVERY 8 HOURS PRN
Qty: 90 TABLET | Refills: 0 | Status: CANCELLED | OUTPATIENT
Start: 2022-12-02 | End: 2023-01-01

## 2022-11-29 RX ORDER — GABAPENTIN 100 MG/1
200 CAPSULE ORAL NIGHTLY
Qty: 180 CAPSULE | Refills: 0 | Status: SHIPPED | OUTPATIENT
Start: 2022-12-20 | End: 2023-03-20

## 2022-11-29 RX ORDER — LEVOTHYROXINE SODIUM 0.07 MG/1
75 TABLET ORAL DAILY
Qty: 90 TABLET | Refills: 1 | Status: SHIPPED | OUTPATIENT
Start: 2022-11-29

## 2022-11-29 RX ORDER — CELECOXIB 200 MG/1
200 CAPSULE ORAL 2 TIMES DAILY PRN
Qty: 180 CAPSULE | Refills: 1 | Status: SHIPPED | OUTPATIENT
Start: 2022-11-29 | End: 2023-02-27

## 2022-11-29 RX ORDER — UBIDECARENONE 75 MG
CAPSULE ORAL DAILY
COMMUNITY

## 2022-11-29 ASSESSMENT — ENCOUNTER SYMPTOMS
NAUSEA: 0
DIARRHEA: 0
SHORTNESS OF BREATH: 0
COUGH: 0
BLOOD IN STOOL: 0
EYE PAIN: 0
WHEEZING: 0
CONSTIPATION: 0
ABDOMINAL PAIN: 0
VOMITING: 0

## 2022-11-29 NOTE — PROGRESS NOTES
11/29/2022    Tommy Kamara is a 45 y.o. female here for:    Chief Complaint   Patient presents with    Diabetes     3 month follow-up     Hypothyroidism     3 month follow-up    Knee Pain     3 month follow-up        HPI:  T2DM  - Last HgbA1c= 5.6% on 11/25/2022.   - Not currently on DM medications. Metformin was stopped in the past.  - On an ACE inhibitor for renal protection. On lisinopril 5 mg QD.   - Not on a statin. Last lipid panel on 11/25/2022 showed LDL= 67. Hypothyroidism   - On Synthroid 75 mcg QD. Reports compliance with medication. Denies side effects of medication.   - Admits to constipation.   - Denies hair loss and palpitations. Chronic knee pain   - Started at age 13.   - Has failed corticosteroid injections while living in [place], [place]. - X-rays from 09/2021 showed bilateral osteoarthritis. - Was referred by prior PCP to [facility]. Saw Dr. Maricarmen Hardy. Dr. Maricarmen Hardy told patient that she needs bilateral knee replacements, but she needs to lose weight first. Patient is following with [facility].   - On Celebrex 200 mg BID and tramadol 50 mg TID PRN. Current Outpatient Medications   Medication Sig Dispense Refill    celecoxib (CELEBREX) 200 MG capsule Take 1 capsule by mouth 2 times daily as needed for Pain 180 capsule 1    [START ON 12/20/2022] gabapentin (NEURONTIN) 100 MG capsule Take 2 capsules by mouth at bedtime for 90 days. 180 capsule 0    levothyroxine (SYNTHROID) 75 MCG tablet Take 1 tablet by mouth Daily 90 tablet 1    vitamin B-12 (CYANOCOBALAMIN) 100 MCG tablet Take by mouth daily      Sennosides (SENOKOT PO) Take by mouth at bedtime      [START ON 12/2/2022] traMADol (ULTRAM) 50 MG tablet Take 1 tablet by mouth every 8 hours as needed for Pain for up to 30 days.  90 tablet 0    Potassium 99 MG TABS Take by mouth at bedtime      Multiple Vitamins-Minerals (PRESERVISION AREDS PO) Take by mouth      Handicap Placard MISC by Does not apply route DURATION 5 YEARS 1 each 0    CINNAMON PO Take by mouth      TURMERIC PO Take by mouth      Calcium Citrate-Vitamin D (CALCIUM + D PO) Take by mouth      Ferrous Sulfate (IRON PO) Take by mouth      b complex vitamins capsule Take 1 capsule by mouth daily      ZINC PO Take by mouth      Ascorbic Acid (VITAMIN C PO) Take by mouth      Omega-3 Fatty Acids (FISH OIL PO) Take by mouth       No current facility-administered medications for this visit.       No Known Allergies    Past Medical & Surgical History:      Diagnosis Date    Basal cell carcinoma (BCC) of skin of nose     Chronic pain of both knees     Class 3 severe obesity due to excess calories with serious comorbidity and body mass index (BMI) of 50.0 to 59.9 in adult [facility])     Diverticulitis     Diverticulosis 2022    History of DVT in adulthood     superficial, right lower extremity, provoked by surgery    Hypothyroidism     Kidney stone     Mild intermittent asthma without complication     MELCHOR (obstructive sleep apnea)     on CPAP, does not follow with Sleep Medicine since moving to [place]    Osteoarthritis     PCOS (polycystic ovarian syndrome)     Tubular adenoma of colon 2022    Type 2 diabetes mellitus with diabetic polyneuropathy, without long-term current use of insulin (HCC)      Past Surgical History:   Procedure Laterality Date    ANKLE SURGERY Bilateral      SECTION N/A     x 1    COLONOSCOPY N/A 2022    COLONOSCOPY POLYPECTOMY SNARE/COLD BIOPSY performed by Devon Benson MD at [facility] N/A     chest    KIDNEY STONE REMOVAL      UMBILICAL HERNIA REPAIR N/A     x 2       Family History:      Problem Relation Age of Onset    Thyroid Disease Mother     High Blood Pressure Mother     Diabetes Mother     Depression Mother     Osteoporosis Mother     Obesity Mother     Stroke Mother     Glaucoma Mother     Heart Attack Father 43    Drug Abuse Sister         methamphetamine    Heart Disease Brother     Gout Brother     Pancreatic Cancer Maternal Uncle         half brother    Prostate Cancer Paternal Uncle     Diabetes Maternal Grandmother     Alcohol Abuse Maternal Grandfather     Lung Cancer Maternal Grandfather     Stroke Paternal Grandmother     Heart Attack Paternal Grandfather        Social History:  Social History     Tobacco Use    Smoking status: Never    Smokeless tobacco: Never   Substance Use Topics    Alcohol use: Not Currently     Comment: rare       Review of Systems   Constitutional:  Negative for chills and fever. Eyes:  Negative for pain and visual disturbance. Respiratory:  Negative for cough, shortness of breath and wheezing. Cardiovascular:  Negative for chest pain, palpitations and leg swelling. Gastrointestinal:  Negative for abdominal pain, blood in stool, constipation, diarrhea, nausea and vomiting. Neurological:  Negative for dizziness, syncope and light-headedness. BP Readings from Last 3 Encounters:   11/29/22 100/70   11/08/22 (!) 111/58   09/14/22 116/68       VS:  /70 (Site: Right Upper Arm, Position: Sitting, Cuff Size: Large Adult)   Pulse 76   Temp 97.6 °F (36.4 °C)   Wt (!) 322 lb (146.1 kg)   SpO2 98%   BMI 47.55 kg/m²     Physical Exam  Vitals reviewed. Constitutional:       General: She is awake. She is not in acute distress. Appearance: She is well-developed and well-groomed. She is morbidly obese. She is not ill-appearing, toxic-appearing or diaphoretic. Neck:      Vascular: No carotid bruit. Cardiovascular:      Rate and Rhythm: Normal rate and regular rhythm. Heart sounds: S1 normal and S2 normal. No murmur heard. Pulmonary:      Breath sounds: No decreased breath sounds, wheezing, rhonchi or rales. Abdominal:      General: Bowel sounds are normal. There is no distension. Palpations: Abdomen is soft. Tenderness: There is no abdominal tenderness. There is no guarding or rebound.    Musculoskeletal:      Cervical back: Neck supple. Right lower leg: No tenderness. No edema. Left lower leg: No tenderness. No edema. Skin:     General: Skin is warm and dry. Neurological:      General: No focal deficit present. Mental Status: She is alert. Psychiatric:         Attention and Perception: Attention and perception normal.         Mood and Affect: Mood and affect normal.         Speech: Speech normal.         Behavior: Behavior normal. Behavior is cooperative. Thought Content: Thought content normal.         Cognition and Memory: Cognition and memory normal.         Judgment: Judgment normal.       Assessment/Plan:  1. Type 2 diabetes mellitus with diabetic neuropathy, without long-term current use of insulin ([address]  Well-controlled. HgbA1c= 5.6%. Not on DM medications. Due to low BP, will stop lisinopril 5 mg QD. Continue gabapentin 200 mg HS for neuropathy. Follow-up in 3 months with blood work. - gabapentin (NEURONTIN) 100 MG capsule; Take 2 capsules by mouth at bedtime for 90 days. Dispense: 180 capsule; Refill: 0  - Hemoglobin A1C; Future  - LIPID PANEL; Future  - CBC with Auto Differential; Future  - Comprehensive Metabolic Panel; Future    2. Acquired hypothyroidism  Well-controlled. TSH from 11/25/2022 within normal range. Continue Synthroid 75 mcg QD. Follow-up in 3 months. - levothyroxine (SYNTHROID) 75 MCG tablet; Take 1 tablet by mouth Daily  Dispense: 90 tablet; Refill: 1  - TSH; Future    3. Bilateral chronic knee pain  Stable. Continue Celebrex 200 mg BID PRN and tramadol 50 mg TID PRN. Check UDS. Follow-up in 3 months. - celecoxib (CELEBREX) 200 MG capsule; Take 1 capsule by mouth 2 times daily as needed for Pain  Dispense: 180 capsule; Refill: 1  - PAIN MANAGEMENT PROFILE 1 W/ CONFIRMATION, URINE; Future  - OPIATE, Quantitative, Urine Panel; Future    4.  Flu vaccine need  - Influenza, FLUCELVAX, (age 10 mo+), IM, Preservative Free, 0.5 mL      Return in about 3 months (around 2/28/2023) for 3 month med check.       Holli Bahena, DO  Family Medicine

## 2022-11-30 LAB
6-MAM, QUANTITATIVE, URINE: <10
6-MONOACETYLMORPHINE, URINE: NOT DETECTED
ALCOHOL URINE: NOT DETECTED
AMPHETAMINE SCREEN, URINE: NOT DETECTED
BARBITURATE SCREEN URINE: NOT DETECTED
BENZODIAZEPINE SCREEN, URINE: NOT DETECTED
BUPRENORPHINE URINE: NOT DETECTED
CANNABINOID SCREEN URINE: NOT DETECTED
COCAINE METABOLITE SCREEN URINE: NOT DETECTED
CODEINE, QUANTITATIVE, URINE: <50
COMMENT: NORMAL
FENTANYL SCREEN, URINE: NOT DETECTED
HYDROCODONE, QUANTITATIVE, URINE: <50
HYDROMORPHONE, QUANTITATIVE, URINE: <50
INTEGRITY CHECK, CREATININE, URINE: 183.1
INTEGRITY CHECK, OXIDANT, URINE: <40
INTEGRITY CHECK, PH, URINE: 5.6 (ref 4.5–9)
INTEGRITY CHECK, SPECIFIC GRAVITY, URINE: 1.03 (ref 1–1.03)
INTEGRITY CHECK, SPECIMEN INTEGRITY, URINE: ABNORMAL
Lab: ABNORMAL
METHADONE SCREEN, URINE: NOT DETECTED
MORPHINE, QUANTITATIVE, URINE: <50
NORHYDROCODONE, QUANTITATIVE, URINE: <50
NOROXYCODONE, QUANTITATIVE, URINE: <50
O-DESMETHYLTRAMADOL, QUANTITATIVE, URINE: >1000
OPIATE SCREEN URINE: NOT DETECTED
OXYCODONE URINE, QUANTITATIVE: <50
OXYCODONE URINE: NOT DETECTED
OXYMORPHONE, QUANTITATIVE, URINE: <50
PHENCYCLIDINE SCREEN URINE: NOT DETECTED
TRAMADOL SCREEN URINE: POSITIVE
TRAMADOL,UR,QN: >1000

## 2023-01-04 DIAGNOSIS — M25.562 BILATERAL CHRONIC KNEE PAIN: ICD-10-CM

## 2023-01-04 DIAGNOSIS — G89.29 BILATERAL CHRONIC KNEE PAIN: ICD-10-CM

## 2023-01-04 DIAGNOSIS — M25.561 BILATERAL CHRONIC KNEE PAIN: ICD-10-CM

## 2023-01-04 RX ORDER — TRAMADOL HYDROCHLORIDE 50 MG/1
TABLET ORAL
Qty: 90 TABLET | Refills: 0 | Status: SHIPPED | OUTPATIENT
Start: 2023-01-04 | End: 2023-02-03

## 2023-01-30 DIAGNOSIS — M25.561 BILATERAL CHRONIC KNEE PAIN: ICD-10-CM

## 2023-01-30 DIAGNOSIS — G89.29 BILATERAL CHRONIC KNEE PAIN: ICD-10-CM

## 2023-01-30 DIAGNOSIS — M25.562 BILATERAL CHRONIC KNEE PAIN: ICD-10-CM

## 2023-01-30 RX ORDER — TRAMADOL HYDROCHLORIDE 50 MG/1
TABLET ORAL
Qty: 90 TABLET | Refills: 0 | Status: SHIPPED
Start: 2023-02-04 | End: 2023-03-02

## 2023-02-14 ENCOUNTER — OFFICE VISIT (OUTPATIENT)
Dept: BARIATRICS/WEIGHT MGMT | Age: 53
End: 2023-02-14
Payer: COMMERCIAL

## 2023-02-14 VITALS
SYSTOLIC BLOOD PRESSURE: 131 MMHG | HEART RATE: 74 BPM | HEIGHT: 69 IN | TEMPERATURE: 97.7 F | WEIGHT: 293 LBS | DIASTOLIC BLOOD PRESSURE: 76 MMHG | BODY MASS INDEX: 43.4 KG/M2

## 2023-02-14 DIAGNOSIS — M25.562 CHRONIC PAIN OF BOTH KNEES: Primary | ICD-10-CM

## 2023-02-14 DIAGNOSIS — G89.29 CHRONIC PAIN OF BOTH KNEES: Primary | ICD-10-CM

## 2023-02-14 DIAGNOSIS — M25.561 CHRONIC PAIN OF BOTH KNEES: Primary | ICD-10-CM

## 2023-02-14 DIAGNOSIS — E66.01 CLASS 3 SEVERE OBESITY DUE TO EXCESS CALORIES WITH SERIOUS COMORBIDITY AND BODY MASS INDEX (BMI) OF 45.0 TO 49.9 IN ADULT (HCC): ICD-10-CM

## 2023-02-14 PROCEDURE — 3078F DIAST BP <80 MM HG: CPT | Performed by: INTERNAL MEDICINE

## 2023-02-14 PROCEDURE — 3074F SYST BP LT 130 MM HG: CPT | Performed by: INTERNAL MEDICINE

## 2023-02-14 PROCEDURE — 99213 OFFICE O/P EST LOW 20 MIN: CPT | Performed by: INTERNAL MEDICINE

## 2023-02-14 PROCEDURE — 99211 OFF/OP EST MAY X REQ PHY/QHP: CPT

## 2023-02-14 NOTE — PROGRESS NOTES
CC -   Follow up of: Chronic knee pain - arthritis - needs BMI <42 for TKA, weight gain. BACKGROUND -   Last visit: 11/8/22  First visit: 2/14/22    Obesity (all weight in lbs)  Began early adulthood  Initial BMI 55.50, Wt 375.8, Ht 69\"  HS Grad wt 230   Lowest   wt 230   Highest  wt 380 (2021 June)  Pattern of wt gain: gradual  Wt change past yr: -5 lbs  Most wt lost: 40 lbs  Other diets attempted: [employer]. Did a lot of exercise when young    Desire to lose weight: 8/10  Problem posed by appetite: 4-5/10    Initial Diet:    Number of meals per day - 3    Number of snacks per day - 0-1 (about once a week)    Meal volume - 12\" plate,  occasional seconds    Fast food/convenience store - 0-1x/week    Restaurants (not fast food) - 0x/week (included in fast food)   Sweets - 3d/week (pastries, pumpkin bread, fruity chewy)   Chips - 0-1d/week   Crackers/pretzels - 0-1d/week   Nuts - 0d/week (diverticulitis)   Peanut Butter - 1d/week   Popcorn - 0d/week   Dried fruit - 0d/week   Whole fruit - 7d/week (bananas, apples, clementines, pineapples grapes)   Breakfast cereal - 0-1d/week   Granola/Protein/Energy bar - 0d/week   Sugar sweetened beverages - sweet tea ([place] 8-16 oz/day). Coffee with creamer (75 Eric/tbsp) - 3-4 tbsp/cup 3-4x/wk + 1 tbsp sugar. No soda/pops. Protein - No supplements   Fiber - No supplements (used to have fiber one bars)    Breakfast: cottage cheese with cherry tomatoes etc, or toast with peanut butter or cheese etc.  Lunch: TV dinner (pot pie, chinese etc), or yogurt + slice of cheese etc.  Dinner: varies, baked potatoes, roast and broccoli. Had honey fried chicken breast, 2 eggs, country fried potatoes served with biscuit (half of it). Initial Exercise:    Gym membership - no    Walking - no    Running - no    Resistance - no    Aerobic class - no. Has total body vibration machine.         Initial Sleep: Bedtime: 10-midnight, wake up time: 7-8 am - usually rested with CPAP + o2 2l/min, daytime naps: no.    ______________________    [facility] -  Past Medical History:   Diagnosis Date    Basal cell carcinoma (BCC) of skin of nose     Chronic pain of both knees     Class 3 severe obesity due to excess calories with serious comorbidity and body mass index (BMI) of 50.0 to 59.9 in adult [facility])     Diverticulitis     Diverticulosis 2022    History of DVT in adulthood     superficial, right lower extremity, provoked by surgery    Hypothyroidism     Kidney stone     Mild intermittent asthma without complication     MELCHOR (obstructive sleep apnea)     on CPAP, does not follow with Sleep Medicine since moving to [place]    Osteoarthritis     PCOS (polycystic ovarian syndrome)     Tubular adenoma of colon 2022    Type 2 diabetes mellitus with diabetic polyneuropathy, without long-term current use of insulin ([address]    Unclear if patient has Sjogren's disease - was ruled out. RA - worked up for and ruled out. Past Surgical History:   Procedure Laterality Date    ANKLE SURGERY Bilateral      SECTION N/A     x 1    COLONOSCOPY N/A 2022    COLONOSCOPY POLYPECTOMY SNARE/COLD BIOPSY performed by Shannan Sneed MD at [facility] N/A     chest    KIDNEY STONE REMOVAL      UMBILICAL HERNIA REPAIR N/A     x 2     Prior to Admission medications    Medication Sig Start Date End Date Taking? Authorizing Provider   traMADol (ULTRAM) 50 MG tablet TAKE ONE TABLET BY MOUTH EVERY 8 HOURS AS NEEDED FOR PAIN for up to 30 days. reduce doses taken as pain becomes manageable. 2/4/23 3/6/23  Rafat Wright, DO   celecoxib (CELEBREX) 200 MG capsule Take 1 capsule by mouth 2 times daily as needed for Pain 22  [place],    gabapentin (NEURONTIN) 100 MG capsule Take 2 capsules by mouth at bedtime for 90 days.  12/20/22 3/20/23  Rafat Wright, DO   levothyroxine (SYNTHROID) 75 MCG tablet Take 1 tablet by mouth Daily 22   Rafat Wright, DO   vitamin B-12 (CYANOCOBALAMIN) 100 MCG tablet Take by mouth daily    Historical Provider, MD   Sennosides (SENOKOT PO) Take by mouth at bedtime    Historical Provider, MD   Potassium 99 MG TABS Take by mouth at bedtime    Historical Provider, MD   Multiple Vitamins-Minerals (PRESERVISION AREDS PO) Take by mouth    Historical Provider, MD   Handicap Placard MISC by Does not apply route DURATION 5 YEARS 11/23/21   Demi Posada,    CINNAMON PO Take by mouth    Historical Provider, MD   TURMERIC PO Take by mouth    Historical Provider, MD   Calcium Citrate-Vitamin D (CALCIUM + D PO) Take by mouth    Historical Provider, MD   Ferrous Sulfate (IRON PO) Take by mouth    Historical Provider, MD   b complex vitamins capsule Take 1 capsule by mouth daily    Historical Provider, MD   ZINC PO Take by mouth    Historical Provider, MD   Ascorbic Acid (VITAMIN C PO) Take by mouth    Historical Provider, MD   Omega-3 Fatty Acids (FISH OIL PO) Take by mouth    Historical Provider, MD       2/14/23 Not taking Metformin or BP medications as they are better. Allergies: No Known Allergies, environmental allergies. Family history: DM: mother, Heart disease: father (passed away with MI at 43 yrs of age), father side. Social history: smoking: never ; Alcohol: no , work: sedentary work. 3 steps and has difficulty climbing up and down. ROS -  Card - no CP. GI - no N/V, has loose BM (watery, unkown why but had workup) once to twice a day. PE -  Gen : /76 (Site: Left Upper Arm, Position: Sitting, Cuff Size: Large Adult)   Pulse 74   Temp 97.7 °F (36.5 °C) (Temporal)   Ht 5' 9\" (1.753 m)   Wt (!) 323 lb 9.6 oz (146.8 kg)   BMI 47.79 kg/m²    WN, WD, NAD  Lung: Nml resp effort, CTA B/L at present  Heart:  RRR w/o MGR, trace - 1+ LE pitting edema b/l  Psych: Normal mood   Full affect  Neuro:  Moves all ext well  ______________________    HISTORY & ASSESSMENT/PLAN -     Problem 1  - Bilateral knee pain, h/o ankle hardwares   HPI - Ongoing, gradually progressing which pt feels like due to weight gain. Was told it is bone on bone, will need BMI<42 for surgery (<284 lbs)  Assessment  - Uncontrolled, weight gain is likely primary contributor   Plan   - currently is on Celebrex and tramadol prn (takes tid) that seems to be helping. Weight reduction can help not only with pain but will help her undergo surgery as well. Weight reduction per plan below    Problem 2  - Obesity   HPI   - See above Background for description    Weight  Date    375.8 lbs 2/14/22    360.4  3/14/22    347.0  4/25/22    336.4  6/6/22    328.0  8/29/22    323.6  11/8/22    323.6  2/14/23 (326.3 at home)    Total weight change to date: -52.2 lbs. Average daily energy variance:   2/14/2022 - 3/14/2022: -13.8 lbs (76273 Eric)/27 days = -1789 Eric/day deficit (after correction of 1.6 lbs for water weight and starting the day after last visit). 3/14/2022 - 4/25/2022: -13.4 lbs (98710 Eric)/41 days = -1144 Eric/day deficit. 4/25/2022 - 6/6/2022: -10.6 lbs (11742 Eric)/41 days = -905 Eric/day deficit. 6/6/2022 - 8/29/2022: -8.4 lbs (82483 Eric)/84 days = -350 Eric/day deficit. 11/8/2022 - 2/15/2023: +0.0 lbs (0 Eric)/99 d = +0 Eric/d excess. DEN = 2347 Eric/d    Update:  Calorie monitoring: calorie conscious (feels she may not be measuring as much)  Sweets: 3d/wk  SSB: none except coffee and tea with flavored creamer  Restaurant food: 0x/month  Protein: egg substitute, meat, beans  Fiber: benefiber 1 glass every other day  Water: about 48 oz/d, circul  Activities: gym daily 5-6x/wk with son when he was home, up and down stairs    Assessment  - improving    Plan   - she has not gained weight back, but has not lost eighter. We planned to resume calorie counting, talked about macro and micronutrients. Well see how see does without any appetite suppressant at this time.  Planned as follows  Patient Instructions   Rules:  Count every calorie every day (you can resume using nutritionix christopher or any other free christopher)  Limit sweets to one day per month  Limit chips/crackers/pretzels/nuts/popcorn to 150 martir/day  Eliminate all sugar sweetened beverages (including fruit juice)  Limit restaurants (including fast food and food from a convenience store) to one time every two weeks while in town     Requirements:  Make sure protein intake is at least 70 grams per day   Make sure that fiber intake is at least 25 grams per day  Take one multivitamin every day     Targets:  Limit calorie intake to 1400 calories/day  Walk 30 minutes daily or equivalent (210 min/week)  Avoid eating 2 hours within bedtime. Tips:  Do not eat outside of the dining room or the kitchen  Do not eat while watching TV, videos, working on the computer or using a smart phone  Do not eat food out of a multi-serving bag or container. Follow up in 3 months. Total time spent on this encounter: 26 minutes. Francisco Randle MD  Internal Medicine/Obesity Medicine  2/14/2023.

## 2023-02-14 NOTE — PATIENT INSTRUCTIONS
Rules:  Count every calorie every day (you can resume using nutritionix christopher or any other free christopher)  Limit sweets to one day per month  Limit chips/crackers/pretzels/nuts/popcorn to 150 martir/day  Eliminate all sugar sweetened beverages (including fruit juice)  Limit restaurants (including fast food and food from a convenience store) to one time every two weeks while in town     Requirements:  Make sure protein intake is at least 70 grams per day   Make sure that fiber intake is at least 25 grams per day  Take one multivitamin every day     Targets:  Limit calorie intake to 1400 calories/day  Walk 30 minutes daily or equivalent (210 min/week)  Avoid eating 2 hours within bedtime. Tips:  Do not eat outside of the dining room or the kitchen  Do not eat while watching TV, videos, working on the computer or using a smart phone  Do not eat food out of a multi-serving bag or container. Follow up in 3 months.

## 2023-02-25 ENCOUNTER — HOSPITAL ENCOUNTER (OUTPATIENT)
Age: 53
Discharge: HOME | End: 2023-02-25
Payer: COMMERCIAL

## 2023-02-25 DIAGNOSIS — E03.9 ACQUIRED HYPOTHYROIDISM: ICD-10-CM

## 2023-02-25 DIAGNOSIS — E11.40 TYPE 2 DIABETES MELLITUS WITH DIABETIC NEUROPATHY, WITHOUT LONG-TERM CURRENT USE OF INSULIN (HCC): ICD-10-CM

## 2023-02-25 LAB
ALBUMIN SERPL-MCNC: 4.1 G/DL (ref 3.5–5.2)
ALP BLD-CCNC: 67 U/L (ref 35–104)
ALT SERPL-CCNC: 12 U/L (ref 0–32)
ANION GAP SERPL CALCULATED.3IONS-SCNC: 7 MMOL/L (ref 7–16)
AST SERPL-CCNC: 15 U/L (ref 0–31)
BASOPHILS ABSOLUTE: 0.04 E9/L (ref 0–0.2)
BASOPHILS RELATIVE PERCENT: 0.6 % (ref 0–2)
BILIRUB SERPL-MCNC: 0.8 MG/DL (ref 0–1.2)
BUN BLDV-MCNC: 11 MG/DL (ref 6–20)
CALCIUM SERPL-MCNC: 9.4 MG/DL (ref 8.6–10.2)
CHLORIDE BLD-SCNC: 100 MMOL/L (ref 98–107)
CHOLESTEROL, TOTAL: 133 MG/DL (ref 0–199)
CO2: 32 MMOL/L (ref 22–29)
CREAT SERPL-MCNC: 0.7 MG/DL (ref 0.5–1)
EOSINOPHILS ABSOLUTE: 0.23 E9/L (ref 0.05–0.5)
EOSINOPHILS RELATIVE PERCENT: 3.2 % (ref 0–6)
GFR SERPL CREATININE-BSD FRML MDRD: >60 ML/MIN/1.73
GLUCOSE BLD-MCNC: 101 MG/DL (ref 74–99)
HBA1C MFR BLD: 5.5 % (ref 4–5.6)
HCT VFR BLD CALC: 38.7 % (ref 34–48)
HDLC SERPL-MCNC: 55 MG/DL
HEMOGLOBIN: 12.6 G/DL (ref 11.5–15.5)
IMMATURE GRANULOCYTES #: 0.02 E9/L
IMMATURE GRANULOCYTES %: 0.3 % (ref 0–5)
LDL CHOLESTEROL CALCULATED: 60 MG/DL (ref 0–99)
LYMPHOCYTES ABSOLUTE: 1.81 E9/L (ref 1.5–4)
LYMPHOCYTES RELATIVE PERCENT: 25.3 % (ref 20–42)
MCH RBC QN AUTO: 29 PG (ref 26–35)
MCHC RBC AUTO-ENTMCNC: 32.6 % (ref 32–34.5)
MCV RBC AUTO: 89 FL (ref 80–99.9)
MONOCYTES ABSOLUTE: 0.38 E9/L (ref 0.1–0.95)
MONOCYTES RELATIVE PERCENT: 5.3 % (ref 2–12)
NEUTROPHILS ABSOLUTE: 4.68 E9/L (ref 1.8–7.3)
NEUTROPHILS RELATIVE PERCENT: 65.3 % (ref 43–80)
PDW BLD-RTO: 12.9 FL (ref 11.5–15)
PLATELET # BLD: 213 E9/L (ref 130–450)
PMV BLD AUTO: 10.6 FL (ref 7–12)
POTASSIUM SERPL-SCNC: 4.2 MMOL/L (ref 3.5–5)
RBC # BLD: 4.35 E12/L (ref 3.5–5.5)
SODIUM BLD-SCNC: 139 MMOL/L (ref 132–146)
TOTAL PROTEIN: 7.4 G/DL (ref 6.4–8.3)
TRIGL SERPL-MCNC: 90 MG/DL (ref 0–149)
TSH SERPL DL<=0.05 MIU/L-ACNC: 2.4 UIU/ML (ref 0.27–4.2)
VLDLC SERPL CALC-MCNC: 18 MG/DL
WBC # BLD: 7.2 E9/L (ref 4.5–11.5)

## 2023-02-25 PROCEDURE — 80061 LIPID PANEL: CPT

## 2023-02-25 PROCEDURE — 36415 COLL VENOUS BLD VENIPUNCTURE: CPT

## 2023-02-25 PROCEDURE — 85025 COMPLETE CBC W/AUTO DIFF WBC: CPT

## 2023-02-25 PROCEDURE — 80053 COMPREHEN METABOLIC PANEL: CPT

## 2023-02-25 PROCEDURE — 83036 HEMOGLOBIN GLYCOSYLATED A1C: CPT

## 2023-02-25 PROCEDURE — 84443 ASSAY THYROID STIM HORMONE: CPT

## 2023-02-28 ENCOUNTER — OFFICE VISIT (OUTPATIENT)
Dept: FAMILY MEDICINE CLINIC | Age: 53
End: 2023-02-28
Payer: COMMERCIAL

## 2023-02-28 VITALS
SYSTOLIC BLOOD PRESSURE: 126 MMHG | OXYGEN SATURATION: 94 % | TEMPERATURE: 97.4 F | HEART RATE: 69 BPM | WEIGHT: 293 LBS | BODY MASS INDEX: 48.41 KG/M2 | DIASTOLIC BLOOD PRESSURE: 84 MMHG

## 2023-02-28 DIAGNOSIS — M25.562 CHRONIC PAIN OF BOTH KNEES: ICD-10-CM

## 2023-02-28 DIAGNOSIS — J06.9 VIRAL URI: ICD-10-CM

## 2023-02-28 DIAGNOSIS — M25.561 CHRONIC PAIN OF BOTH KNEES: ICD-10-CM

## 2023-02-28 DIAGNOSIS — G89.29 CHRONIC PAIN OF BOTH KNEES: ICD-10-CM

## 2023-02-28 DIAGNOSIS — E11.9 TYPE 2 DIABETES MELLITUS WITHOUT COMPLICATION, WITHOUT LONG-TERM CURRENT USE OF INSULIN (HCC): Primary | ICD-10-CM

## 2023-02-28 DIAGNOSIS — L73.2 HIDRADENITIS SUPPURATIVA: ICD-10-CM

## 2023-02-28 DIAGNOSIS — E03.9 ACQUIRED HYPOTHYROIDISM: ICD-10-CM

## 2023-02-28 PROCEDURE — 3044F HG A1C LEVEL LT 7.0%: CPT | Performed by: FAMILY MEDICINE

## 2023-02-28 PROCEDURE — 3074F SYST BP LT 130 MM HG: CPT | Performed by: FAMILY MEDICINE

## 2023-02-28 PROCEDURE — 3079F DIAST BP 80-89 MM HG: CPT | Performed by: FAMILY MEDICINE

## 2023-02-28 PROCEDURE — 99214 OFFICE O/P EST MOD 30 MIN: CPT | Performed by: FAMILY MEDICINE

## 2023-02-28 RX ORDER — PREDNISONE 20 MG/1
40 TABLET ORAL DAILY
Qty: 10 TABLET | Refills: 0 | Status: SHIPPED | OUTPATIENT
Start: 2023-02-28 | End: 2023-03-05

## 2023-02-28 RX ORDER — UBIDECARENONE 10 MG
CAPSULE ORAL
COMMUNITY

## 2023-02-28 SDOH — ECONOMIC STABILITY: FOOD INSECURITY: WITHIN THE PAST 12 MONTHS, THE FOOD YOU BOUGHT JUST DIDN'T LAST AND YOU DIDN'T HAVE MONEY TO GET MORE.: SOMETIMES TRUE

## 2023-02-28 SDOH — ECONOMIC STABILITY: HOUSING INSECURITY
IN THE LAST 12 MONTHS, WAS THERE A TIME WHEN YOU DID NOT HAVE A STEADY PLACE TO SLEEP OR SLEPT IN A SHELTER (INCLUDING NOW)?: NO

## 2023-02-28 SDOH — ECONOMIC STABILITY: FOOD INSECURITY: WITHIN THE PAST 12 MONTHS, YOU WORRIED THAT YOUR FOOD WOULD RUN OUT BEFORE YOU GOT MONEY TO BUY MORE.: OFTEN TRUE

## 2023-02-28 SDOH — ECONOMIC STABILITY: INCOME INSECURITY: HOW HARD IS IT FOR YOU TO PAY FOR THE VERY BASICS LIKE FOOD, HOUSING, MEDICAL CARE, AND HEATING?: VERY HARD

## 2023-02-28 ASSESSMENT — ENCOUNTER SYMPTOMS
DIARRHEA: 0
NAUSEA: 0
SHORTNESS OF BREATH: 0
CONSTIPATION: 0
ABDOMINAL PAIN: 0
WHEEZING: 0
VOMITING: 0
BLOOD IN STOOL: 0
EYE PAIN: 0
COUGH: 1

## 2023-02-28 ASSESSMENT — PATIENT HEALTH QUESTIONNAIRE - PHQ9
SUM OF ALL RESPONSES TO PHQ QUESTIONS 1-9: 0
SUM OF ALL RESPONSES TO PHQ9 QUESTIONS 1 & 2: 0
SUM OF ALL RESPONSES TO PHQ QUESTIONS 1-9: 0
2. FEELING DOWN, DEPRESSED OR HOPELESS: 0
1. LITTLE INTEREST OR PLEASURE IN DOING THINGS: 0
SUM OF ALL RESPONSES TO PHQ QUESTIONS 1-9: 0
SUM OF ALL RESPONSES TO PHQ QUESTIONS 1-9: 0

## 2023-02-28 ASSESSMENT — LIFESTYLE VARIABLES
HOW OFTEN DO YOU HAVE A DRINK CONTAINING ALCOHOL: MONTHLY OR LESS
HOW MANY STANDARD DRINKS CONTAINING ALCOHOL DO YOU HAVE ON A TYPICAL DAY: 1 OR 2

## 2023-02-28 NOTE — PROGRESS NOTES
2/28/2023    Porter Vallejo is a 45 y.o. female here for:    Chief Complaint   Patient presents with    Chest Congestion     Cough started on Saturday, last Wednesday started sinus drainage, runny nose    Diabetes     3 month med check    Dermatitis     Would like a referral to dermatology for boils under stomach and back of thighs. Seen a commercial for a new RX out    Hypothyroidism     3 month med check    Chronic Pain     3 month med check        HPI:  T2DM  - Not currently on DM medications. Metformin was stopped in the past.  - Last eye exam: March 2022  - Last foot exam: due   - On gabapentin 200 mg HS for diabetic neuropathy. Reports compliance with medication. Denies side effects of medication.   - Not on an ACE inhibitor or ARB due to low BP.   - Not on a statin due to LDL <70 naturally. The 10-year ASCVD risk score (Dread MAGAÑA, et al., 2019) is: 2.1%    Values used to calculate the score:      Age: 45 years      Sex: Female      Is Non- : No      Diabetic: Yes      Tobacco smoker: No      Systolic Blood Pressure: 577 mmHg      Is BP treated: No      HDL Cholesterol: 55 mg/dL      Total Cholesterol: 133 mg/dL    Hypothyroidism   - On Synthroid 75 mcg QD. Reports compliance with medication. Denies side effects of medication.   - Denies unintended weight changes and palpitations. - Admits to occasional constipation. Chronic knee pain   - Started at age 13.   - Has failed corticosteroid injections while living in [place], [place]. - X-rays from 09/2021 showed bilateral osteoarthritis. - Was referred by prior PCP to [facility]. Saw Dr. Aniya Leslie. Dr. Aniya Leslie told patient that she needs bilateral knee replacements, but she needs to lose weight first. Patient is following [facility].   - On Celebrex 200 mg BID and tramadol 50 mg TID PRN.      URI-like symptoms   - Started Tuesday   - Symptoms include sore throat, rhinorrhea, congestion, chest congestion, and cough  - Denies fevers, chills, vomiting, and diarrhea   - Has tried NyQuil and DayQuil with no significant improvement in her symptoms     Current Outpatient Medications   Medication Sig Dispense Refill    Coenzyme Q10 10 MG CAPS Take by mouth      predniSONE (DELTASONE) 20 MG tablet Take 2 tablets by mouth daily for 5 days 10 tablet 0    traMADol (ULTRAM) 50 MG tablet TAKE ONE TABLET BY MOUTH EVERY 8 HOURS AS NEEDED FOR PAIN for up to 30 days. reduce doses taken as pain becomes manageable. 90 tablet 0    celecoxib (CELEBREX) 200 MG capsule Take 1 capsule by mouth 2 times daily as needed for Pain 180 capsule 1    gabapentin (NEURONTIN) 100 MG capsule Take 2 capsules by mouth at bedtime for 90 days. 180 capsule 0    levothyroxine (SYNTHROID) 75 MCG tablet Take 1 tablet by mouth Daily 90 tablet 1    Sennosides (SENOKOT PO) Take by mouth at bedtime      Potassium 99 MG TABS Take by mouth at bedtime      Multiple Vitamins-Minerals (PRESERVISION AREDS PO) Take by mouth      Handicap Placard MISC by Does not apply route DURATION 5 YEARS 1 each 0    CINNAMON PO Take by mouth      TURMERIC PO Take by mouth      Calcium Citrate-Vitamin D (CALCIUM + D PO) Take by mouth      Ferrous Sulfate (IRON PO) Take by mouth      b complex vitamins capsule Take 1 capsule by mouth daily      Ascorbic Acid (VITAMIN C PO) Take by mouth       No current facility-administered medications for this visit.      No Known Allergies    Past Medical & Surgical History:      Diagnosis Date    Basal cell carcinoma (BCC) of skin of nose     Chronic pain of both knees     Class 3 severe obesity due to excess calories with serious comorbidity and body mass index (BMI) of 50.0 to 59.9 in adult (HCC)     Diverticulitis     Diverticulosis 09/14/2022    History of DVT in adulthood     superficial, right lower extremity, provoked by surgery    Hypothyroidism     Kidney stone     Mild intermittent asthma without complication     MELCHOR  (obstructive sleep apnea)     on CPAP, does not follow with Sleep Medicine since moving to [address] [place] [address]     PCOS (polycystic ovarian syndrome)     Tubular adenoma of colon 2022    Type 2 diabetes mellitus with diabetic polyneuropathy, without long-term current use of insulin (HCC)      Past Surgical History:   Procedure Laterality Date    ANKLE SURGERY Bilateral      SECTION N/A     x 1    COLONOSCOPY N/A 2022    COLONOSCOPY POLYPECTOMY SNARE/COLD BIOPSY performed by Lisset Dukes MD at [facility] N/A     chest    KIDNEY STONE [facility] N/A     x 2       Family History:      Problem Relation Age of Onset    Thyroid Disease Mother     High Blood Pressure Mother     Diabetes Mother     Depression Mother     Osteoporosis Mother     Obesity Mother     Stroke Mother     Glaucoma Mother     Heart Attack Father 43    Drug Abuse Sister         methamphetamine    Heart Disease Brother     Gout Brother     Pancreatic Cancer Maternal Uncle         half brother    Prostate Cancer Paternal Uncle     Diabetes Maternal Grandmother     Alcohol Abuse Maternal Grandfather     Lung Cancer Maternal Grandfather     Stroke Paternal Grandmother     Heart Attack Paternal Grandfather        Social History:  Social History     Tobacco Use    Smoking status: Never    Smokeless tobacco: Never   Substance Use Topics    Alcohol use: Not Currently     Comment: rare       Review of Systems   Constitutional:  Negative for chills and fever. Eyes:  Negative for pain and visual disturbance. Respiratory:  Positive for cough. Negative for shortness of breath and wheezing. Cardiovascular:  Negative for chest pain, palpitations and leg swelling. Gastrointestinal:  Negative for abdominal pain, blood in stool, constipation, diarrhea, nausea and vomiting. Neurological:  Negative for dizziness, syncope and light-headedness.      BP Readings from Last 3 Encounters: 02/28/23 126/84   02/14/23 131/76   11/29/22 100/70       VS:  /84 (Site: Left Upper Arm, Position: Sitting, Cuff Size: Large Adult)   Pulse 69   Temp 97.4 °F (36.3 °C)   Wt (!) 327 lb 12.8 oz (148.7 kg)   SpO2 94%   BMI 48.41 kg/m²     Physical Exam  Vitals reviewed. Constitutional:       General: She is awake. She is not in acute distress. Appearance: She is well-developed and well-groomed. She is morbidly obese. She is not ill-appearing, toxic-appearing or diaphoretic. Neck:      Vascular: No carotid bruit. Cardiovascular:      Rate and Rhythm: Normal rate and regular rhythm. Heart sounds: S1 normal and S2 normal. No murmur heard. Pulmonary:      Breath sounds: Wheezing present. No decreased breath sounds, rhonchi or rales. Abdominal:      General: Bowel sounds are normal. There is no distension. Palpations: Abdomen is soft. Tenderness: There is no abdominal tenderness. There is no guarding or rebound. Musculoskeletal:      Cervical back: Neck supple. Right lower leg: No tenderness. No edema. Left lower leg: No tenderness. No edema. Skin:     General: Skin is warm and dry. Neurological:      General: No focal deficit present. Mental Status: She is alert. Psychiatric:         Attention and Perception: Attention and perception normal.         Mood and Affect: Mood and affect normal.         Speech: Speech normal.         Behavior: Behavior normal. Behavior is cooperative. Thought Content: Thought content normal.         Cognition and Memory: Cognition and memory normal.         Judgment: Judgment normal.       Assessment/Plan:  1. Type 2 diabetes mellitus without complication, without long-term current use of insulin (HCC)  Well-controlled off of DM medication. HgbA1c= 5.5%. Continue to monitor off of DM medication. Continue gabapentin 200 mg HS for neuropathy.   - Hemoglobin A1C; Future  - LIPID PANEL;  Future  - CBC with Auto Differential; Future  - Comprehensive Metabolic Panel; Future  - MICROALBUMIN / CREATININE URINE RATIO; Future    2. Acquired hypothyroidism  Well-controlled. Continue Synthroid 75 mcg QD.   - TSH; Future    3. Chronic pain of both knees  Stable. Continue Celebrex 200 mg BID and tramadol 50 mg TID PRN. PDMP reviewed today. Controlled substance contract on file. Check UDS. - TRAMADOL, Quantitative, Urine; Future  - PAIN MANAGEMENT PROFILE 1 W/ CONFIRMATION, URINE; Future    4. Viral URI  Acute problem. Wheezing on examination. Will do prednisone 40 mg QD x 5 days. - predniSONE (DELTASONE) 20 MG tablet; Take 2 tablets by mouth daily for 5 days  Dispense: 10 tablet; Refill: 0    5. Hidradenitis suppurativa  - Héctor Basurto MD,  Dermatology, [place] ([facility])        Return in about 3 months (around 5/28/2023) for 3 month med check.       En Acosta, DO  Family Medicine

## 2023-03-01 LAB
6-MONOACETYLMORPHINE, URINE: NOT DETECTED
ALCOHOL URINE: NOT DETECTED
AMPHETAMINE SCREEN, URINE: NOT DETECTED
BARBITURATE SCREEN URINE: NOT DETECTED
BENZODIAZEPINE SCREEN, URINE: NOT DETECTED
BUPRENORPHINE URINE: NOT DETECTED
CANNABINOID SCREEN URINE: NOT DETECTED
COCAINE METABOLITE SCREEN URINE: NOT DETECTED
COMMENT: NORMAL
FENTANYL SCREEN, URINE: NOT DETECTED
INTEGRITY CHECK, CREATININE, URINE: 111
INTEGRITY CHECK, OXIDANT, URINE: <40
INTEGRITY CHECK, PH, URINE: 5.8 (ref 4.5–9)
INTEGRITY CHECK, SPECIFIC GRAVITY, URINE: 1.02 (ref 1–1.03)
INTEGRITY CHECK, SPECIMEN INTEGRITY, URINE: ABNORMAL
Lab: ABNORMAL
METHADONE SCREEN, URINE: NOT DETECTED
O-DESMETHYLTRAMADOL, QUANTITATIVE, URINE: >1000
OPIATE SCREEN URINE: NOT DETECTED
OXYCODONE URINE: NOT DETECTED
PHENCYCLIDINE SCREEN URINE: NOT DETECTED
TRAMADOL SCREEN URINE: POSITIVE
TRAMADOL,UR,QN: >1000

## 2023-03-02 DIAGNOSIS — M25.561 BILATERAL CHRONIC KNEE PAIN: ICD-10-CM

## 2023-03-02 DIAGNOSIS — M25.562 BILATERAL CHRONIC KNEE PAIN: ICD-10-CM

## 2023-03-02 DIAGNOSIS — G89.29 BILATERAL CHRONIC KNEE PAIN: ICD-10-CM

## 2023-03-02 RX ORDER — TRAMADOL HYDROCHLORIDE 50 MG/1
TABLET ORAL
Qty: 90 TABLET | Refills: 0 | Status: SHIPPED | OUTPATIENT
Start: 2023-03-02 | End: 2023-04-01

## 2023-03-02 NOTE — TELEPHONE ENCOUNTER
Medication Refill Request    LOV 2/28/2023  NOV 6/1/2023    Lab Results   Component Value Date    CREATININE 0.7 02/25/2023

## 2023-03-29 DIAGNOSIS — M25.562 BILATERAL CHRONIC KNEE PAIN: ICD-10-CM

## 2023-03-29 DIAGNOSIS — M25.561 BILATERAL CHRONIC KNEE PAIN: ICD-10-CM

## 2023-03-29 DIAGNOSIS — G89.29 BILATERAL CHRONIC KNEE PAIN: ICD-10-CM

## 2023-03-30 RX ORDER — TRAMADOL HYDROCHLORIDE 50 MG/1
TABLET ORAL
Qty: 90 TABLET | Refills: 0 | Status: SHIPPED | OUTPATIENT
Start: 2023-03-30 | End: 2023-04-29

## 2023-05-11 DIAGNOSIS — G89.29 BILATERAL CHRONIC KNEE PAIN: ICD-10-CM

## 2023-05-11 DIAGNOSIS — M25.562 BILATERAL CHRONIC KNEE PAIN: ICD-10-CM

## 2023-05-11 DIAGNOSIS — M25.561 BILATERAL CHRONIC KNEE PAIN: ICD-10-CM

## 2023-05-11 RX ORDER — TRAMADOL HYDROCHLORIDE 50 MG/1
50 TABLET ORAL EVERY 8 HOURS PRN
Qty: 90 TABLET | Refills: 0 | Status: SHIPPED | OUTPATIENT
Start: 2023-05-11 | End: 2023-06-10

## 2023-05-15 ENCOUNTER — OFFICE VISIT (OUTPATIENT)
Dept: BARIATRICS/WEIGHT MGMT | Age: 53
End: 2023-05-15
Payer: COMMERCIAL

## 2023-05-15 VITALS
WEIGHT: 293 LBS | HEIGHT: 69 IN | SYSTOLIC BLOOD PRESSURE: 128 MMHG | BODY MASS INDEX: 43.4 KG/M2 | HEART RATE: 83 BPM | DIASTOLIC BLOOD PRESSURE: 71 MMHG

## 2023-05-15 DIAGNOSIS — E66.01 CLASS 3 SEVERE OBESITY DUE TO EXCESS CALORIES WITH SERIOUS COMORBIDITY AND BODY MASS INDEX (BMI) OF 45.0 TO 49.9 IN ADULT (HCC): ICD-10-CM

## 2023-05-15 DIAGNOSIS — M25.561 CHRONIC PAIN OF BOTH KNEES: Primary | ICD-10-CM

## 2023-05-15 DIAGNOSIS — G89.29 CHRONIC PAIN OF BOTH KNEES: Primary | ICD-10-CM

## 2023-05-15 DIAGNOSIS — M25.562 CHRONIC PAIN OF BOTH KNEES: Primary | ICD-10-CM

## 2023-05-15 PROCEDURE — 99214 OFFICE O/P EST MOD 30 MIN: CPT | Performed by: INTERNAL MEDICINE

## 2023-05-15 PROCEDURE — 99211 OFF/OP EST MAY X REQ PHY/QHP: CPT

## 2023-05-15 PROCEDURE — 3017F COLORECTAL CA SCREEN DOC REV: CPT | Performed by: INTERNAL MEDICINE

## 2023-05-15 PROCEDURE — 1036F TOBACCO NON-USER: CPT | Performed by: INTERNAL MEDICINE

## 2023-05-15 PROCEDURE — 3078F DIAST BP <80 MM HG: CPT | Performed by: INTERNAL MEDICINE

## 2023-05-15 PROCEDURE — G8428 CUR MEDS NOT DOCUMENT: HCPCS | Performed by: INTERNAL MEDICINE

## 2023-05-15 PROCEDURE — G8417 CALC BMI ABV UP PARAM F/U: HCPCS | Performed by: INTERNAL MEDICINE

## 2023-05-15 PROCEDURE — 3074F SYST BP LT 130 MM HG: CPT | Performed by: INTERNAL MEDICINE

## 2023-05-15 RX ORDER — PHENTERMINE HYDROCHLORIDE 37.5 MG/1
37.5 TABLET ORAL
Qty: 30 TABLET | Refills: 0 | Status: SHIPPED | OUTPATIENT
Start: 2023-05-15 | End: 2023-06-14

## 2023-05-15 NOTE — PATIENT INSTRUCTIONS
Rules:  Count every calorie every day  Limit sweets to one day per month  Limit chips/crackers/pretzels/nuts/popcorn to 150 martir/day  Eliminate all sugar sweetened beverages (including fruit juice)  Limit restaurants (including fast food and food from a convenience store) to one time every two weeks while in town     Requirements:  Make sure protein intake is at least 70 grams per day   Make sure that fiber intake is at least 25 grams per day  Take one multivitamin every day     Targets:  Limit calorie intake to 1400 calories/day  Walk 30 minutes daily or equivalent (210 min/week)  Avoid eating 2 hours within bedtime. Tips:  Do not eat outside of the dining room or the kitchen  Do not eat while watching TV, videos, working on the computer or using a smart phone  Do not eat food out of a multi-serving bag or container. Phentermine:  Take phentermine 37.5 mg, one-half to one tablet daily as needed for appetite suppression. Take each dose 30-90 min before effect will be needed. While taking phentermine, check the Blood Pressure every morning and every evening. If the systolic BP is >597 mmHg, the diastolic BP is >46 mm/Hg or the heart rate is > 100 beats per minute, do not take phentermine that day. If the systolic BP is consistently >155 mmHg, the diastolic BP is consistently above 90 mm/Hg or the heart rate is consistently > 100 beats per minute, then stop taking phentermine altogether. If the systolic BP >506 mmHg or the diastolic BP is >374 mmHg (even if it is only once), then phentermine should be stopped altogether without proving that any of these are consistently elevated. Follow up in 1 month.

## 2023-05-15 NOTE — PROGRESS NOTES
3d/wk, now able to walk 3/4 miles in gym, (1.5 miles in [address] 2x/wk), gym when son is at home    Assessment  - uncontrolled    Plan   - In the last 3 months, she has been having difficulty with appetite control girish in the evenings. Has not watched or counted calories. Would like to resume it, but feels she needs something for appetite suppression. After going over options and adverse effects, we planned to start Phentermine. Planned as follows  Patient Instructions   Rules:  Count every calorie every day  Limit sweets to one day per month  Limit chips/crackers/pretzels/nuts/popcorn to 150 martir/day  Eliminate all sugar sweetened beverages (including fruit juice)  Limit restaurants (including fast food and food from a convenience store) to one time every two weeks while in town     Requirements:  Make sure protein intake is at least 70 grams per day   Make sure that fiber intake is at least 25 grams per day  Take one multivitamin every day     Targets:  Limit calorie intake to 1400 calories/day  Walk 30 minutes daily or equivalent (210 min/week)  Avoid eating 2 hours within bedtime. Tips:  Do not eat outside of the dining room or the kitchen  Do not eat while watching TV, videos, working on the computer or using a smart phone  Do not eat food out of a multi-serving bag or container. Phentermine:  Take phentermine 37.5 mg, one-half to one tablet daily as needed for appetite suppression. Take each dose 30-90 min before effect will be needed. While taking phentermine, check the Blood Pressure every morning and every evening. If the systolic BP is >438 mmHg, the diastolic BP is >89 mm/Hg or the heart rate is > 100 beats per minute, do not take phentermine that day. If the systolic BP is consistently >155 mmHg, the diastolic BP is consistently above 90 mm/Hg or the heart rate is consistently > 100 beats per minute, then stop taking phentermine altogether.     If the systolic BP >365 mmHg or the

## 2023-05-30 ENCOUNTER — TELEPHONE (OUTPATIENT)
Dept: FAMILY MEDICINE CLINIC | Age: 53
End: 2023-05-30

## 2023-05-30 ENCOUNTER — HOSPITAL ENCOUNTER (OUTPATIENT)
Age: 53
Discharge: HOME | End: 2023-05-30
Payer: COMMERCIAL

## 2023-05-30 DIAGNOSIS — E03.9 ACQUIRED HYPOTHYROIDISM: ICD-10-CM

## 2023-05-30 DIAGNOSIS — E11.9 TYPE 2 DIABETES MELLITUS WITHOUT COMPLICATION, WITHOUT LONG-TERM CURRENT USE OF INSULIN (HCC): ICD-10-CM

## 2023-05-30 LAB
ALBUMIN SERPL-MCNC: 4 G/DL (ref 3.5–5.2)
ALP SERPL-CCNC: 61 U/L (ref 35–104)
ALT SERPL-CCNC: 12 U/L (ref 0–32)
ANION GAP SERPL CALCULATED.3IONS-SCNC: 9 MMOL/L (ref 7–16)
AST SERPL-CCNC: 12 U/L (ref 0–31)
BASOPHILS # BLD: 0.02 E9/L (ref 0–0.2)
BASOPHILS NFR BLD: 0.2 % (ref 0–2)
BILIRUB SERPL-MCNC: 0.5 MG/DL (ref 0–1.2)
BUN SERPL-MCNC: 16 MG/DL (ref 6–20)
CALCIUM SERPL-MCNC: 9.1 MG/DL (ref 8.6–10.2)
CHLORIDE SERPL-SCNC: 107 MMOL/L (ref 98–107)
CHOLESTEROL, TOTAL: 137 MG/DL (ref 0–199)
CO2 SERPL-SCNC: 29 MMOL/L (ref 22–29)
CREAT SERPL-MCNC: 0.8 MG/DL (ref 0.5–1)
CREAT UR-MCNC: 143 MG/DL (ref 29–226)
EOSINOPHIL # BLD: 0.18 E9/L (ref 0.05–0.5)
EOSINOPHIL NFR BLD: 2.2 % (ref 0–6)
ERYTHROCYTE [DISTWIDTH] IN BLOOD BY AUTOMATED COUNT: 13.2 FL (ref 11.5–15)
GLUCOSE SERPL-MCNC: 122 MG/DL (ref 74–99)
HBA1C MFR BLD: 5.5 % (ref 4–5.6)
HCT VFR BLD AUTO: 38.2 % (ref 34–48)
HDLC SERPL-MCNC: 50 MG/DL
HGB BLD-MCNC: 12.1 G/DL (ref 11.5–15.5)
IMM GRANULOCYTES # BLD: 0.01 E9/L
IMM GRANULOCYTES NFR BLD: 0.1 % (ref 0–5)
LDLC SERPL CALC-MCNC: 68 MG/DL (ref 0–99)
LYMPHOCYTES # BLD: 2.32 E9/L (ref 1.5–4)
LYMPHOCYTES NFR BLD: 27.8 % (ref 20–42)
MCH RBC QN AUTO: 28.7 PG (ref 26–35)
MCHC RBC AUTO-ENTMCNC: 31.7 % (ref 32–34.5)
MCV RBC AUTO: 90.7 FL (ref 80–99.9)
MICROALBUMIN UR-MCNC: <12 MG/L
MICROALBUMIN/CREAT UR-RTO: ABNORMAL (ref 0–30)
MONOCYTES # BLD: 0.54 E9/L (ref 0.1–0.95)
MONOCYTES NFR BLD: 6.5 % (ref 2–12)
NEUTROPHILS # BLD: 5.29 E9/L (ref 1.8–7.3)
NEUTS SEG NFR BLD: 63.2 % (ref 43–80)
PLATELET # BLD AUTO: 235 E9/L (ref 130–450)
PMV BLD AUTO: 10.8 FL (ref 7–12)
POTASSIUM SERPL-SCNC: 4.3 MMOL/L (ref 3.5–5)
PROT SERPL-MCNC: 7.1 G/DL (ref 6.4–8.3)
RBC # BLD AUTO: 4.21 E12/L (ref 3.5–5.5)
SODIUM SERPL-SCNC: 145 MMOL/L (ref 132–146)
TRIGL SERPL-MCNC: 94 MG/DL (ref 0–149)
TSH SERPL-MCNC: 4.35 UIU/ML (ref 0.27–4.2)
VLDLC SERPL CALC-MCNC: 19 MG/DL
WBC # BLD: 8.4 E9/L (ref 4.5–11.5)

## 2023-05-30 PROCEDURE — 84443 ASSAY THYROID STIM HORMONE: CPT

## 2023-05-30 PROCEDURE — 82570 ASSAY OF URINE CREATININE: CPT

## 2023-05-30 PROCEDURE — 85025 COMPLETE CBC W/AUTO DIFF WBC: CPT

## 2023-05-30 PROCEDURE — 80061 LIPID PANEL: CPT

## 2023-05-30 PROCEDURE — 83036 HEMOGLOBIN GLYCOSYLATED A1C: CPT

## 2023-05-30 PROCEDURE — 82044 UR ALBUMIN SEMIQUANTITATIVE: CPT

## 2023-05-30 PROCEDURE — 36415 COLL VENOUS BLD VENIPUNCTURE: CPT

## 2023-05-30 PROCEDURE — 80053 COMPREHEN METABOLIC PANEL: CPT

## 2023-05-30 NOTE — TELEPHONE ENCOUNTER
Advised pt to take thyroid meds separate from other medications this morning. She called back stating she takes Phentermine 37.5 1/2 tab and would like to know if this could be affecting her thyroid. She has an upcoming visit 06/01 and is ok w discussing at that time.

## 2023-06-01 ENCOUNTER — OFFICE VISIT (OUTPATIENT)
Dept: FAMILY MEDICINE CLINIC | Age: 53
End: 2023-06-01
Payer: COMMERCIAL

## 2023-06-01 VITALS
BODY MASS INDEX: 49.53 KG/M2 | HEART RATE: 80 BPM | TEMPERATURE: 97.3 F | WEIGHT: 293 LBS | OXYGEN SATURATION: 99 % | DIASTOLIC BLOOD PRESSURE: 78 MMHG | SYSTOLIC BLOOD PRESSURE: 114 MMHG

## 2023-06-01 DIAGNOSIS — G89.29 BILATERAL CHRONIC KNEE PAIN: ICD-10-CM

## 2023-06-01 DIAGNOSIS — E03.9 ACQUIRED HYPOTHYROIDISM: ICD-10-CM

## 2023-06-01 DIAGNOSIS — M25.561 BILATERAL CHRONIC KNEE PAIN: ICD-10-CM

## 2023-06-01 DIAGNOSIS — Z12.31 ENCOUNTER FOR SCREENING MAMMOGRAM FOR MALIGNANT NEOPLASM OF BREAST: ICD-10-CM

## 2023-06-01 DIAGNOSIS — M25.562 BILATERAL CHRONIC KNEE PAIN: ICD-10-CM

## 2023-06-01 DIAGNOSIS — E11.40 TYPE 2 DIABETES MELLITUS WITH DIABETIC NEUROPATHY, WITHOUT LONG-TERM CURRENT USE OF INSULIN (HCC): Primary | ICD-10-CM

## 2023-06-01 PROCEDURE — 2022F DILAT RTA XM EVC RTNOPTHY: CPT | Performed by: PHYSICIAN ASSISTANT

## 2023-06-01 PROCEDURE — 1036F TOBACCO NON-USER: CPT | Performed by: PHYSICIAN ASSISTANT

## 2023-06-01 PROCEDURE — 3044F HG A1C LEVEL LT 7.0%: CPT | Performed by: PHYSICIAN ASSISTANT

## 2023-06-01 PROCEDURE — 3074F SYST BP LT 130 MM HG: CPT | Performed by: PHYSICIAN ASSISTANT

## 2023-06-01 PROCEDURE — 3017F COLORECTAL CA SCREEN DOC REV: CPT | Performed by: PHYSICIAN ASSISTANT

## 2023-06-01 PROCEDURE — G8427 DOCREV CUR MEDS BY ELIG CLIN: HCPCS | Performed by: PHYSICIAN ASSISTANT

## 2023-06-01 PROCEDURE — G8417 CALC BMI ABV UP PARAM F/U: HCPCS | Performed by: PHYSICIAN ASSISTANT

## 2023-06-01 PROCEDURE — 3078F DIAST BP <80 MM HG: CPT | Performed by: PHYSICIAN ASSISTANT

## 2023-06-01 PROCEDURE — 99213 OFFICE O/P EST LOW 20 MIN: CPT | Performed by: PHYSICIAN ASSISTANT

## 2023-06-01 RX ORDER — LEVOTHYROXINE SODIUM 0.07 MG/1
75 TABLET ORAL DAILY
Qty: 90 TABLET | Refills: 1 | Status: SHIPPED | OUTPATIENT
Start: 2023-06-01

## 2023-06-01 RX ORDER — CELECOXIB 200 MG/1
200 CAPSULE ORAL 2 TIMES DAILY PRN
Qty: 180 CAPSULE | Refills: 1 | Status: SHIPPED | OUTPATIENT
Start: 2023-06-01 | End: 2023-08-30

## 2023-06-01 RX ORDER — GABAPENTIN 100 MG/1
200 CAPSULE ORAL NIGHTLY
Qty: 180 CAPSULE | Refills: 1 | Status: SHIPPED | OUTPATIENT
Start: 2023-06-01 | End: 2023-08-30

## 2023-06-01 RX ORDER — TRAMADOL HYDROCHLORIDE 50 MG/1
50 TABLET ORAL EVERY 8 HOURS PRN
Qty: 90 TABLET | Refills: 0 | Status: SHIPPED | OUTPATIENT
Start: 2023-06-01 | End: 2023-07-01

## 2023-06-01 NOTE — PROGRESS NOTES
Chief Complaint:  Hypertension and Diabetes    History of Present Illness:  Source of history provided by:  patient. T2DM  - Not currently on DM medications. Metformin was stopped in the past.  - Last eye exam: due; March 2022  - Last foot exam: due   - On gabapentin 200 mg HS for diabetic neuropathy. Reports compliance with medication. Denies side effects of medication.   - Not on an ACE inhibitor or ARB due to low BP.   - Not on a statin due to LDL <70 naturally. The 10-year ASCVD risk score (Dread MAGAÑA, et al., 2019) is: 2%    Values used to calculate the score:      Age: 45 years      Sex: Female      Is Non- : No      Diabetic: Yes      Tobacco smoker: No      Systolic Blood Pressure: 649 mmHg      Is BP treated: No      HDL Cholesterol: 50 mg/dL      Total Cholesterol: 137 mg/dL     Hypothyroidism   - On Synthroid 75 mcg QD.   - TSH slightly out of range with recent labs. Has been taking Synthroid with Tramadol. Also recently started Adipex     Chronic knee pain   - Started at age 13.   - Has failed corticosteroid injections while living in [place], [place]. - X-rays from 09/2021 showed bilateral osteoarthritis. - Was referred by prior PCP to [facility]. Saw Dr. Uziel Gamez. Dr. Uziel Gamez told patient that she needs bilateral knee replacements, but she needs to lose weight first. Patient is following with [facility].   - On Celebrex 200 mg BID and tramadol 50 mg TID PRN.      Review of Systems: Unless otherwise stated in this report or unable to obtain because of the patient's clinical or mental status as evidenced by the medical record, this patients's positive and negative responses for Review of Systems, constitutional, psych, eyes, ENT, cardiovascular, respiratory, gastrointestinal, neurological, genitourinary, musculoskeletal, integument systems and systems related to the presenting problem are either stated in the preceding or were not pertinent or were

## 2023-06-19 ENCOUNTER — OFFICE VISIT (OUTPATIENT)
Dept: BARIATRICS/WEIGHT MGMT | Age: 53
End: 2023-06-19
Payer: COMMERCIAL

## 2023-06-19 VITALS
BODY MASS INDEX: 43.4 KG/M2 | WEIGHT: 293 LBS | DIASTOLIC BLOOD PRESSURE: 69 MMHG | TEMPERATURE: 96.8 F | HEART RATE: 72 BPM | HEIGHT: 69 IN | SYSTOLIC BLOOD PRESSURE: 122 MMHG

## 2023-06-19 DIAGNOSIS — M25.561 CHRONIC PAIN OF BOTH KNEES: Primary | ICD-10-CM

## 2023-06-19 DIAGNOSIS — M25.562 CHRONIC PAIN OF BOTH KNEES: Primary | ICD-10-CM

## 2023-06-19 DIAGNOSIS — G89.29 CHRONIC PAIN OF BOTH KNEES: Primary | ICD-10-CM

## 2023-06-19 DIAGNOSIS — E66.01 CLASS 3 SEVERE OBESITY DUE TO EXCESS CALORIES WITH SERIOUS COMORBIDITY AND BODY MASS INDEX (BMI) OF 45.0 TO 49.9 IN ADULT (HCC): ICD-10-CM

## 2023-06-19 PROCEDURE — 3078F DIAST BP <80 MM HG: CPT | Performed by: INTERNAL MEDICINE

## 2023-06-19 PROCEDURE — G8417 CALC BMI ABV UP PARAM F/U: HCPCS | Performed by: INTERNAL MEDICINE

## 2023-06-19 PROCEDURE — 99211 OFF/OP EST MAY X REQ PHY/QHP: CPT | Performed by: INTERNAL MEDICINE

## 2023-06-19 PROCEDURE — 99214 OFFICE O/P EST MOD 30 MIN: CPT | Performed by: INTERNAL MEDICINE

## 2023-06-19 PROCEDURE — 3017F COLORECTAL CA SCREEN DOC REV: CPT | Performed by: INTERNAL MEDICINE

## 2023-06-19 PROCEDURE — G8428 CUR MEDS NOT DOCUMENT: HCPCS | Performed by: INTERNAL MEDICINE

## 2023-06-19 PROCEDURE — 1036F TOBACCO NON-USER: CPT | Performed by: INTERNAL MEDICINE

## 2023-06-19 PROCEDURE — 3074F SYST BP LT 130 MM HG: CPT | Performed by: INTERNAL MEDICINE

## 2023-06-19 RX ORDER — PHENTERMINE HYDROCHLORIDE 37.5 MG/1
37.5 TABLET ORAL
Qty: 30 TABLET | Refills: 0 | Status: SHIPPED | OUTPATIENT
Start: 2023-06-19 | End: 2023-07-19

## 2023-06-19 NOTE — PATIENT INSTRUCTIONS
Rules:  Count every calorie every day  Limit sweets to one day per month  Limit chips/crackers/pretzels/nuts/popcorn to 150 martir/day  Eliminate all sugar sweetened beverages (including fruit juice)  Limit restaurants (including fast food and food from a convenience store) to one time every two weeks while in town     Requirements:  Make sure protein intake is at least 70 grams per day   Make sure that fiber intake is at least 25 grams per day  Take one multivitamin every day     Targets:  Limit calorie intake to 1400 calories/day  Walk 30 minutes daily or equivalent (210 min/week)  Avoid eating 2 hours within bedtime. Phentermine:  Take phentermine 37.5 mg, one-half to one tablet daily as needed for appetite suppression. Take each dose 30-90 min before effect will be needed. While taking phentermine, check the Blood Pressure every morning and every evening. If the systolic BP is >801 mmHg, the diastolic BP is >65 mm/Hg or the heart rate is > 100 beats per minute, do not take phentermine that day. If the systolic BP is consistently >155 mmHg, the diastolic BP is consistently above 90 mm/Hg or the heart rate is consistently > 100 beats per minute, then stop taking phentermine altogether. If the systolic BP >881 mmHg or the diastolic BP is >330 mmHg (even if it is only once), then phentermine should be stopped altogether without proving that any of these are consistently elevated. Follow up in 1 month.

## 2023-06-19 NOTE — PROGRESS NOTES
CC -   Follow up of: Chronic knee pain - arthritis - needs BMI <42 for TKA, weight gain. BACKGROUND -   Last visit: 5/15/23  First visit: 2/14/22    Obesity (all weight in lbs)  Began early adulthood  Initial BMI 55.50, Wt 375.8, Ht 69\"  HS Grad wt 230   Lowest   wt 230   Highest  wt 380 (2021 June)  Pattern of wt gain: gradual  Wt change past yr: -5 lbs  Most wt lost: 40 lbs  Other diets attempted: [employer]. Did a lot of exercise when young    Desire to lose weight: 8/10  Problem posed by appetite: 4-5/10    Initial Diet:    Number of meals per day - 3    Number of snacks per day - 0-1 (about once a week)    Meal volume - 12\" plate,  occasional seconds    Fast food/convenience store - 0-1x/week    Restaurants (not fast food) - 0x/week (included in fast food)   Sweets - 3d/week (pastries, pumpkin bread, fruity chewy)   Chips - 0-1d/week   Crackers/pretzels - 0-1d/week   Nuts - 0d/week (diverticulitis)   Peanut Butter - 1d/week   Popcorn - 0d/week   Dried fruit - 0d/week   Whole fruit - 7d/week (bananas, apples, clementines, pineapples grapes)   Breakfast cereal - 0-1d/week   Granola/Protein/Energy bar - 0d/week   Sugar sweetened beverages - sweet tea ([place] 8-16 oz/day). Coffee with creamer (75 Eric/tbsp) - 3-4 tbsp/cup 3-4x/wk + 1 tbsp sugar. No soda/pops. Protein - No supplements   Fiber - No supplements (used to have fiber one bars)    Breakfast: cottage cheese with cherry tomatoes etc, or toast with peanut butter or cheese etc.  Lunch: TV dinner (pot pie, chinese etc), or yogurt + slice of cheese etc.  Dinner: varies, baked potatoes, roast and broccoli. Had honey fried chicken breast, 2 eggs, country fried potatoes served with biscuit (half of it). Initial Exercise:    Gym membership - no    Walking - no    Running - no    Resistance - no    Aerobic class - no. Has total body vibration machine.         Initial Sleep: Bedtime: 10-midnight, wake up time: 7-8 am - usually rested with CPAP + o2 2l/min, daytime

## 2023-06-23 ENCOUNTER — HOSPITAL ENCOUNTER (OUTPATIENT)
Dept: MAMMOGRAPHY | Age: 53
Discharge: HOME | End: 2023-06-23
Payer: COMMERCIAL

## 2023-06-23 DIAGNOSIS — Z12.31 ENCOUNTER FOR SCREENING MAMMOGRAM FOR MALIGNANT NEOPLASM OF BREAST: ICD-10-CM

## 2023-06-23 PROCEDURE — 77063 BREAST TOMOSYNTHESIS BI: CPT

## 2023-07-05 DIAGNOSIS — G89.29 BILATERAL CHRONIC KNEE PAIN: ICD-10-CM

## 2023-07-05 DIAGNOSIS — M25.562 BILATERAL CHRONIC KNEE PAIN: ICD-10-CM

## 2023-07-05 DIAGNOSIS — M25.561 BILATERAL CHRONIC KNEE PAIN: ICD-10-CM

## 2023-07-05 NOTE — TELEPHONE ENCOUNTER
Medication Refill Request    LOV 6/1/2023  NOV 9/8/2023    Lab Results   Component Value Date    CREATININE 0.8 05/30/2023

## 2023-07-06 RX ORDER — TRAMADOL HYDROCHLORIDE 50 MG/1
TABLET ORAL
Qty: 90 TABLET | Refills: 0 | Status: SHIPPED | OUTPATIENT
Start: 2023-07-06 | End: 2023-08-05

## 2023-07-09 DIAGNOSIS — M25.561 BILATERAL CHRONIC KNEE PAIN: ICD-10-CM

## 2023-07-09 DIAGNOSIS — M25.562 BILATERAL CHRONIC KNEE PAIN: ICD-10-CM

## 2023-07-09 DIAGNOSIS — G89.29 BILATERAL CHRONIC KNEE PAIN: ICD-10-CM

## 2023-07-10 RX ORDER — TRAMADOL HYDROCHLORIDE 50 MG/1
TABLET ORAL
Qty: 90 TABLET | Refills: 0 | OUTPATIENT
Start: 2023-07-10 | End: 2023-08-09

## 2023-07-17 ENCOUNTER — OFFICE VISIT (OUTPATIENT)
Dept: BARIATRICS/WEIGHT MGMT | Age: 53
End: 2023-07-17
Payer: COMMERCIAL

## 2023-07-17 VITALS
WEIGHT: 293 LBS | BODY MASS INDEX: 43.4 KG/M2 | HEIGHT: 69 IN | TEMPERATURE: 97.1 F | DIASTOLIC BLOOD PRESSURE: 77 MMHG | SYSTOLIC BLOOD PRESSURE: 126 MMHG | HEART RATE: 70 BPM

## 2023-07-17 DIAGNOSIS — E66.01 CLASS 3 SEVERE OBESITY DUE TO EXCESS CALORIES WITH SERIOUS COMORBIDITY AND BODY MASS INDEX (BMI) OF 45.0 TO 49.9 IN ADULT (HCC): ICD-10-CM

## 2023-07-17 DIAGNOSIS — G89.29 CHRONIC PAIN OF BOTH KNEES: Primary | ICD-10-CM

## 2023-07-17 DIAGNOSIS — M25.561 CHRONIC PAIN OF BOTH KNEES: Primary | ICD-10-CM

## 2023-07-17 DIAGNOSIS — M25.562 CHRONIC PAIN OF BOTH KNEES: Primary | ICD-10-CM

## 2023-07-17 PROCEDURE — G8428 CUR MEDS NOT DOCUMENT: HCPCS | Performed by: INTERNAL MEDICINE

## 2023-07-17 PROCEDURE — 99211 OFF/OP EST MAY X REQ PHY/QHP: CPT

## 2023-07-17 PROCEDURE — 3017F COLORECTAL CA SCREEN DOC REV: CPT | Performed by: INTERNAL MEDICINE

## 2023-07-17 PROCEDURE — 1036F TOBACCO NON-USER: CPT | Performed by: INTERNAL MEDICINE

## 2023-07-17 PROCEDURE — 3074F SYST BP LT 130 MM HG: CPT | Performed by: INTERNAL MEDICINE

## 2023-07-17 PROCEDURE — G8417 CALC BMI ABV UP PARAM F/U: HCPCS | Performed by: INTERNAL MEDICINE

## 2023-07-17 PROCEDURE — 99214 OFFICE O/P EST MOD 30 MIN: CPT | Performed by: INTERNAL MEDICINE

## 2023-07-17 PROCEDURE — 3078F DIAST BP <80 MM HG: CPT | Performed by: INTERNAL MEDICINE

## 2023-07-17 RX ORDER — PHENTERMINE HYDROCHLORIDE 37.5 MG/1
37.5 TABLET ORAL
Qty: 30 TABLET | Refills: 0 | Status: SHIPPED | OUTPATIENT
Start: 2023-07-17 | End: 2023-08-16

## 2023-07-17 NOTE — PATIENT INSTRUCTIONS
Patient:   VIVIAN AUGUSTINE            MRN: [id number]            FIN: [id number]              Age:   82 years     Sex:  MALE     :  32   Associated Diagnoses:   None   Author:   GUILLERMO MICHELLE     Subjective   Additional Info No acute events overnight. Patient recieved 1 dose digoxin at 12pm yesterday with ; HR has been in 80's this AM with tele showng Afib with PVC's; 1 HR at 110 10pm last night, no pauses. Patient is eating breakfast in chair and is no acute distress with 2L NC. He denies SOB, dizziness, chest pain, palpitations, edema, fevers, and chills.  .       Review of Systems   Constitutional:  Negative except as documented in history of present illness.   Eye:  Negative except as documented in history of present illness.   Ear/Nose/Mouth/Throat:  Negative except as documented in history of present illness.   Cardiovascular:  Negative except as documented in history of present illness.   Respiratory:  Negative except as documented in history of present illness.   Gastrointestinal:  Negative except as documented in history of present illness.   Genitourinary:  Negative except as documented in history of present illness.   Musculoskeletal:  Negative except as documented in history of present illness.   Integumentary:  Negative except as documented in history of present illness.   Hematology/Lymphatics:  Negative except as documented in history of present illness.   Neurologic:  Negative except as documented in history of present illness.   Endocrine:  Negative except as documented in history of present illness.   Allergy/Immunologic:  Negative except as documented in history of present illness.   Psychiatric:  Negative except as documented in history of present illness.      Physical Examination   VS/Measurements     Vitals between:   13-AUG-2019 07:56:01   TO   14-AUG-2019 07:56:01                   LAST RESULT MINIMUM MAXIMUM  Temperature 36.0 36.0 36.9  Heart Rate 88 78 106  Respiratory  Rules:  Continue calorie conscious diet  Limit sweets to one day per month  Limit chips/crackers/pretzels/nuts/popcorn to 150 martir/day  Eliminate all sugar sweetened beverages (including fruit juice)  Limit restaurants (including fast food and food from a convenience store) to one time every two weeks while in town     Requirements:  Make sure protein intake is at least 70 grams per day   Make sure that fiber intake is at least 25 grams per day  Take one multivitamin every day     Targets:  Limit calorie intake to 1400 calories/day  Walk 30 minutes daily or equivalent (210 min/week)  Avoid eating 2 hours within bedtime. Phentermine:  Take phentermine 37.5 mg, one-half to one tablet daily as needed for appetite suppression. Take each dose 30-90 min before effect will be needed. While taking phentermine, check the Blood Pressure every morning and every evening. If the systolic BP is >379 mmHg, the diastolic BP is >54 mm/Hg or the heart rate is > 100 beats per minute, do not take phentermine that day. If the systolic BP is consistently >155 mmHg, the diastolic BP is consistently above 90 mm/Hg or the heart rate is consistently > 100 beats per minute, then stop taking phentermine altogether. If the systolic BP >499 mmHg or the diastolic BP is >000 mmHg (even if it is only once), then phentermine should be stopped altogether without proving that any of these are consistently elevated. Follow up in 1 month. Rate 16 16 18  NISBP           110 100 126  NIDBP           60 60 83  NIMBP           77 76 97  SpO2                    95 94 96  , Measurements from flowsheet : Height and Weight   08/13/19 08:32 CDT CLINICALWEIGHT 92.4 kg    Weight Method Measured    Weight Scale Standing       Intake and Output   I&O 24 hr   I & O between:  13-AUG-2019 07:57 TO 14-AUG-2019 07:57  Med Dosing Weight:  101.3  kg   09-AUG-2019  24 Hour Intake:   961.00  ( 9.49 mL/kg )  24 Hour Output:   525.00           24 Hour Urine/Stool Output:   0.0  24 Hour Balance:   436.00           24 Hour Urine Output:   525.00  ( 0.22 mL/kg/hr )                   Urine Count:  4.00    Stool Count:  1       General:  Alert and oriented, No acute distress.    Eye:  Pupils are equal, round and reactive to light, Normal conjunctiva.   HENT:  Normocephalic.    Neck:  Supple, Non-tender, No jugular venous distention, No lymphadenopathy.   Respiratory:  Respirations are non-labored, Breath sounds are equal, bilateral inspiratory crackles lower lung fields.   Cardiovascular:  Normal rate, Regular rhythm, No murmur, trace pedal edema.   Gastrointestinal:  Soft, Non-tender, Non-distended, Normal bowel sounds.   Genitourinary:  No costovertebral angle tenderness.    Integumentary:  Warm, Dry, venous stasis dermatitis RLE.    Neurologic:  Alert, Oriented, No focal deficits.    Psychiatric:  Cooperative.      Review / Management   Laboratory results:     Labs between:  13-AUG-2019 07:56 to 14-AUG-2019 07:56  CBC:                 WBC  HgB  Hct  Plt  MCV  RDW   14-AUG-2019 7.2  15.9  49.1  214  92.8  12.8   BMP:                 Na  Cl  BUN  Glu   14-AUG-2019 140  99  (H) 31  (H) 156                              K  CO2  Cr  Ca                              3.8  (H) 40  0.74  9.4   POC GLU:                 Latest Result  Latest Date  Minimum  Min Date  Maximum  Max Date                             (H) 162  14-AUG-2019 (H) 162  14-AUG-2019 (H) 238  13-AUG-2019                  .      Impression and Plan   Dx and Plan:  Diagnosis     82 yoM with PMH significant for HFpEF, Afib, HTN, COPD, DM2, and JUAN JOSÉ who presents with resolving weakness, BLE edema and SOB likely 2/2 to acute on chronic HF vs COPD exacerbation.  #HFpEF with acute exacerbation   #acute on chronic hypoxic respiratory failure  -PO Bumex 1mg qDaily; net (-) 0.4L past 24 hr; patient weight currently at 92kg (from 98 on admission);  -ctm I/O's, weight and lytes  -arterial duplex to be ordered outpatient per cardiology  -Follow-up with CHF clinic scheduled for 8/16 @2:30pm  -Follow-up with cardiologist Dr. Oneal within 1 to 2 weeks.  #paroxsymal atrial fibrillation  -HR well controlled with 1 dose digoxin 8/13  -continue PO eliquis 5mg q12h  -continue metoprolol succinate 75mg po bid  -Digoxin 125mcg every other day on discharge per cardiology  -D/C to home per cardiology if rates improve  #COPD  -currently on daytime 2L NC sat 95% (2L at night is at home regimen)  -normally on Stiolto Respimat 60 ACT 2.5 mcg-2.5 mcg/inh inhalation aerosol 2 puff, Inhaled qDaily  -continue albuterol-ipratropium nebulizer plus prn treatments.   -pco2 is elevated, but he is chronically so given his normal pH  #Hypertensive heart disease  -currently on metoprolol xl 50mg po bid   #DM2   -home med: metformin 500 bid  -SSI, Accu-Cheks  FEN: no IVF, repletes lytes prn, cardiac diet  DVT prophylaxis - SCD boots, Eliquis   PCP: Nilson Samaniego DO  Will discuss with Dr. Patton   Note co-written with Rachael Perez, MS3  Lexy Olmos DO   PGY-1, Psychiatry   .     .

## 2023-07-24 ENCOUNTER — TELEPHONE (OUTPATIENT)
Dept: FAMILY MEDICINE CLINIC | Age: 53
End: 2023-07-24

## 2023-07-24 NOTE — TELEPHONE ENCOUNTER
Pt called she need an authorization sent into the insurance company for her tramadol.  Please advise

## 2023-07-25 NOTE — TELEPHONE ENCOUNTER
Spoke to patient about insurance authorization, I have printed the letter to look into it further of the denial of Tramadol for patient.   Advised Pt it may take a couple of days to contact insurance company, if patient does not hear from the office by Friday, advised patient to call office

## 2023-08-01 DIAGNOSIS — G89.29 BILATERAL CHRONIC KNEE PAIN: ICD-10-CM

## 2023-08-01 DIAGNOSIS — M25.561 BILATERAL CHRONIC KNEE PAIN: ICD-10-CM

## 2023-08-01 DIAGNOSIS — M25.562 BILATERAL CHRONIC KNEE PAIN: ICD-10-CM

## 2023-08-01 RX ORDER — TRAMADOL HYDROCHLORIDE 50 MG/1
50 TABLET ORAL EVERY 8 HOURS PRN
Qty: 90 TABLET | Refills: 0 | Status: SHIPPED | OUTPATIENT
Start: 2023-08-06 | End: 2023-09-05

## 2023-08-01 NOTE — TELEPHONE ENCOUNTER
Medication Refill Request    LOV 6/1/2023  NOV 8/1/2023    Lab Results   Component Value Date    CREATININE 0.8 05/30/2023

## 2023-08-21 ENCOUNTER — HOSPITAL ENCOUNTER (OUTPATIENT)
Age: 53
Discharge: HOME | End: 2023-08-21

## 2023-08-21 DIAGNOSIS — E03.9 ACQUIRED HYPOTHYROIDISM: ICD-10-CM

## 2023-08-21 DIAGNOSIS — E11.40 TYPE 2 DIABETES MELLITUS WITH DIABETIC NEUROPATHY, WITHOUT LONG-TERM CURRENT USE OF INSULIN (HCC): ICD-10-CM

## 2023-08-21 LAB
ALBUMIN SERPL-MCNC: 4.1 G/DL (ref 3.5–5.2)
ALP SERPL-CCNC: 65 U/L (ref 35–104)
ALT SERPL-CCNC: 12 U/L (ref 0–32)
ANION GAP SERPL CALCULATED.3IONS-SCNC: 8 MMOL/L (ref 7–16)
AST SERPL-CCNC: 12 U/L (ref 0–31)
BASOPHILS # BLD: 0.03 K/UL (ref 0–0.2)
BASOPHILS NFR BLD: 1 % (ref 0–2)
BILIRUB SERPL-MCNC: 0.7 MG/DL (ref 0–1.2)
BUN SERPL-MCNC: 12 MG/DL (ref 6–20)
CALCIUM SERPL-MCNC: 9.5 MG/DL (ref 8.6–10.2)
CHLORIDE SERPL-SCNC: 103 MMOL/L (ref 98–107)
CHOLEST SERPL-MCNC: 135 MG/DL
CO2 SERPL-SCNC: 30 MMOL/L (ref 22–29)
CREAT SERPL-MCNC: 0.7 MG/DL (ref 0.5–1)
EOSINOPHIL # BLD: 0.13 K/UL (ref 0.05–0.5)
EOSINOPHILS RELATIVE PERCENT: 2 % (ref 0–6)
ERYTHROCYTE [DISTWIDTH] IN BLOOD BY AUTOMATED COUNT: 13.2 % (ref 11.5–15)
GFR SERPL CREATININE-BSD FRML MDRD: >60 ML/MIN/1.73M2
GLUCOSE SERPL-MCNC: 122 MG/DL (ref 74–99)
HBA1C MFR BLD: 5.8 % (ref 4–5.6)
HCT VFR BLD AUTO: 39.5 % (ref 34–48)
HDLC SERPL-MCNC: 55 MG/DL
HGB BLD-MCNC: 12.8 G/DL (ref 11.5–15.5)
IMM GRANULOCYTES # BLD AUTO: <0.03 K/UL (ref 0–0.58)
IMM GRANULOCYTES NFR BLD: 0 % (ref 0–5)
LDLC SERPL CALC-MCNC: 63 MG/DL
LYMPHOCYTES NFR BLD: 1.49 K/UL (ref 1.5–4)
LYMPHOCYTES RELATIVE PERCENT: 23 % (ref 20–42)
MCH RBC QN AUTO: 29.2 PG (ref 26–35)
MCHC RBC AUTO-ENTMCNC: 32.4 G/DL (ref 32–34.5)
MCV RBC AUTO: 90 FL (ref 80–99.9)
MONOCYTES NFR BLD: 0.39 K/UL (ref 0.1–0.95)
MONOCYTES NFR BLD: 6 % (ref 2–12)
NEUTROPHILS NFR BLD: 68 % (ref 43–80)
NEUTS SEG NFR BLD: 4.38 K/UL (ref 1.8–7.3)
PLATELET # BLD AUTO: 211 K/UL (ref 130–450)
PMV BLD AUTO: 10.6 FL (ref 7–12)
POTASSIUM SERPL-SCNC: 4.4 MMOL/L (ref 3.5–5)
PROT SERPL-MCNC: 7.1 G/DL (ref 6.4–8.3)
RBC # BLD AUTO: 4.39 M/UL (ref 3.5–5.5)
SODIUM SERPL-SCNC: 141 MMOL/L (ref 132–146)
TRIGL SERPL-MCNC: 86 MG/DL
TSH SERPL DL<=0.05 MIU/L-ACNC: 2.32 UIU/ML (ref 0.27–4.2)
VLDLC SERPL CALC-MCNC: 17 MG/DL
WBC OTHER # BLD: 6.4 K/UL (ref 4.5–11.5)

## 2023-08-21 PROCEDURE — 84443 ASSAY THYROID STIM HORMONE: CPT

## 2023-08-21 PROCEDURE — 36415 COLL VENOUS BLD VENIPUNCTURE: CPT

## 2023-08-21 PROCEDURE — 83036 HEMOGLOBIN GLYCOSYLATED A1C: CPT

## 2023-08-21 PROCEDURE — 80061 LIPID PANEL: CPT

## 2023-08-21 PROCEDURE — 80053 COMPREHEN METABOLIC PANEL: CPT

## 2023-08-21 PROCEDURE — 85025 COMPLETE CBC W/AUTO DIFF WBC: CPT

## 2023-09-08 ENCOUNTER — OFFICE VISIT (OUTPATIENT)
Dept: FAMILY MEDICINE CLINIC | Age: 53
End: 2023-09-08

## 2023-09-08 VITALS
BODY MASS INDEX: 48.25 KG/M2 | DIASTOLIC BLOOD PRESSURE: 78 MMHG | SYSTOLIC BLOOD PRESSURE: 110 MMHG | TEMPERATURE: 97.1 F | OXYGEN SATURATION: 98 % | HEART RATE: 75 BPM | WEIGHT: 293 LBS

## 2023-09-08 DIAGNOSIS — E11.9 TYPE 2 DIABETES MELLITUS WITHOUT COMPLICATION, WITHOUT LONG-TERM CURRENT USE OF INSULIN (HCC): Primary | ICD-10-CM

## 2023-09-08 DIAGNOSIS — M25.561 BILATERAL CHRONIC KNEE PAIN: ICD-10-CM

## 2023-09-08 DIAGNOSIS — E03.9 ACQUIRED HYPOTHYROIDISM: ICD-10-CM

## 2023-09-08 DIAGNOSIS — G89.29 BILATERAL CHRONIC KNEE PAIN: ICD-10-CM

## 2023-09-08 DIAGNOSIS — M25.562 BILATERAL CHRONIC KNEE PAIN: ICD-10-CM

## 2023-09-08 PROCEDURE — 99214 OFFICE O/P EST MOD 30 MIN: CPT | Performed by: FAMILY MEDICINE

## 2023-09-08 PROCEDURE — 3074F SYST BP LT 130 MM HG: CPT | Performed by: FAMILY MEDICINE

## 2023-09-08 PROCEDURE — 3078F DIAST BP <80 MM HG: CPT | Performed by: FAMILY MEDICINE

## 2023-09-08 PROCEDURE — 3044F HG A1C LEVEL LT 7.0%: CPT | Performed by: FAMILY MEDICINE

## 2023-09-08 RX ORDER — TRAMADOL HYDROCHLORIDE 50 MG/1
50 TABLET ORAL EVERY 6 HOURS PRN
COMMUNITY
End: 2023-09-08

## 2023-09-08 RX ORDER — PHENTERMINE HYDROCHLORIDE 37.5 MG/1
37.5 TABLET ORAL
COMMUNITY

## 2023-09-08 RX ORDER — TRAMADOL HYDROCHLORIDE 50 MG/1
50 TABLET ORAL EVERY 8 HOURS PRN
Qty: 90 TABLET | Refills: 0 | Status: SHIPPED | OUTPATIENT
Start: 2023-09-08 | End: 2023-10-08

## 2023-10-04 DIAGNOSIS — M25.561 BILATERAL CHRONIC KNEE PAIN: ICD-10-CM

## 2023-10-04 DIAGNOSIS — M25.562 BILATERAL CHRONIC KNEE PAIN: ICD-10-CM

## 2023-10-04 DIAGNOSIS — G89.29 BILATERAL CHRONIC KNEE PAIN: ICD-10-CM

## 2023-10-05 RX ORDER — TRAMADOL HYDROCHLORIDE 50 MG/1
50 TABLET ORAL EVERY 8 HOURS PRN
Qty: 90 TABLET | Refills: 2 | Status: SHIPPED | OUTPATIENT
Start: 2023-10-05 | End: 2023-11-04

## 2023-10-05 NOTE — TELEPHONE ENCOUNTER
Medication Refill Request    LOV 9/8/2023  NOV 3/8/2024    Lab Results   Component Value Date    CREATININE 0.7 08/21/2023

## 2023-11-16 DIAGNOSIS — E03.9 ACQUIRED HYPOTHYROIDISM: ICD-10-CM

## 2023-11-16 RX ORDER — LEVOTHYROXINE SODIUM 0.07 MG/1
75 TABLET ORAL DAILY
Qty: 90 TABLET | Refills: 1 | Status: SHIPPED | OUTPATIENT
Start: 2023-11-16

## 2023-11-26 DIAGNOSIS — G89.29 BILATERAL CHRONIC KNEE PAIN: ICD-10-CM

## 2023-11-26 DIAGNOSIS — M25.561 BILATERAL CHRONIC KNEE PAIN: ICD-10-CM

## 2023-11-26 DIAGNOSIS — M25.562 BILATERAL CHRONIC KNEE PAIN: ICD-10-CM

## 2023-11-26 DIAGNOSIS — E11.40 TYPE 2 DIABETES MELLITUS WITH DIABETIC NEUROPATHY, WITHOUT LONG-TERM CURRENT USE OF INSULIN (HCC): ICD-10-CM

## 2023-11-27 RX ORDER — GABAPENTIN 100 MG/1
CAPSULE ORAL
Qty: 180 CAPSULE | Refills: 0 | Status: SHIPPED | OUTPATIENT
Start: 2023-11-27 | End: 2024-02-25

## 2023-11-27 RX ORDER — CELECOXIB 200 MG/1
CAPSULE ORAL
Qty: 180 CAPSULE | Refills: 0 | Status: SHIPPED | OUTPATIENT
Start: 2023-11-27

## 2023-11-27 NOTE — TELEPHONE ENCOUNTER
[address]        NAME: Karyna Can is a 50 y o  female  : 1971    MRN: [id number]  DATE: 2019  TIME: 5:09 PM    Assessment and Plan   Pharyngitis, unspecified etiology [J02 9]  1  Pharyngitis, unspecified etiology  Throat culture    POCT rapid strepA    methylPREDNISolone 4 MG tablet therapy pack         Patient Instructions     Patient Instructions   Take medrol dose abdirahman with food to prevent gi upset  May use tylenol as well  Recommended salt water gargles  Will call if culture comes back positive        Follow up with PCP in 3-5 days  Proceed to  ER if symptoms worsen  Chief Complaint     Chief Complaint   Patient presents with    Cold Like Symptoms     Pt c/o sore throat, cough, congestion since last night  Denies fever  Pt took Mucinex for symptoms  History of Present Illness       51 y/o F without any significant PMHx presents c/o sore throat since yesterday  Took mucinex without relief  Pain preventing her from eating  Has some congestion, post nasal drip, and cough that is occasionally productive  Denies sick contacts or contact with children  No f/c, ear pain, SOB, wheezing, GI upset, difficulty swallowing/breathing/speaking, neck stiffness      Review of Systems   Review of Systems   Constitutional: Negative for appetite change, chills, fatigue and fever  HENT: Positive for congestion, postnasal drip, rhinorrhea and sore throat  Negative for ear pain, facial swelling, hearing loss, sinus pressure, sinus pain and voice change  Eyes: Negative for pain, redness and visual disturbance  Respiratory: Positive for cough  Negative for chest tightness, shortness of breath and wheezing  Cardiovascular: Negative for chest pain, palpitations and leg swelling  Gastrointestinal: Negative for abdominal pain, constipation, diarrhea, nausea and vomiting  Genitourinary: Negative for difficulty urinating, dysuria and frequency     Musculoskeletal: Negative for Medication Refill Request    LOV 9/8/2023  NOV 3/8/2024    Lab Results   Component Value Date    CREATININE 0.7 08/21/2023 myalgias  Skin: Negative for color change, pallor and wound  Neurological: Negative for dizziness, syncope, numbness and headaches  Hematological: Negative for adenopathy           Current Medications       Current Outpatient Medications:     ALPRAZolam ER (XANAX XR) 0 5 MG 24 hr tablet, Take 1 tablet (0 5 mg total) by mouth daily as needed for anxiety, Disp: 30 tablet, Rfl: 0    amLODIPine (NORVASC) 5 mg tablet, TAKE 1 TABLET DAILY, Disp: 90 tablet, Rfl: 3    Calcium Carbonate-Vitamin D (CALCIUM 600+D) 600-200 MG-UNIT TABS, Take 1 tablet by mouth 2 (two) times a day, Disp: , Rfl:     Cholecalciferol (CVS VITAMIN D3) 1000 units capsule, Take by mouth, Disp: , Rfl:     fluticasone (FLONASE) 50 mcg/act nasal spray, into each nostril daily, Disp: , Rfl:     LO LOESTRIN FE 1 MG-10 MCG / 10 MCG TABS, Take 1 tablet by mouth daily, Disp: 84 tablet, Rfl: 4    methylPREDNISolone 4 MG tablet therapy pack, Use as directed on package, Disp: 1 each, Rfl: 0    Omega-3 Fatty Acids (FISH OIL) 1200 MG CAPS, Take by mouth, Disp: , Rfl:     pantoprazole (PROTONIX) 40 mg tablet, Take 1 tablet by mouth 2 (two) times a day, Disp: , Rfl:     sucralfate (CARAFATE) 1 g tablet, Take by mouth, Disp: , Rfl:     Current Allergies     Allergies as of 07/08/2019 - Reviewed 07/08/2019   Allergen Reaction Noted    Gluten meal  02/28/2018            The following portions of the patient's history were reviewed and updated as appropriate: allergies, current medications, past family history, past medical history, past social history, past surgical history and problem list      Past Medical History:   Diagnosis Date    Diabetes mellitus ([address] ) 02/2011    PRE DM, DIET CONTROLLED       Past Surgical History:   Procedure Laterality Date    KNEE ARTHROSCOPY W/ ACL RECONSTRUCTION AND EPIPHYSEAL HAMSTRING GRAFT  2013       Family History   Problem Relation Age of Onset    Diabetes Sister         Pre DM    Lung cancer Maternal Grandmother  Cancer Maternal Grandfather         Gastric    Stroke Paternal Grandmother         Syndrome    Lung cancer Paternal Grandfather          Medications have been verified  Objective   /90   Pulse 92   Temp 98 4 °F (36 9 °C)   Resp 18   Ht 5' 1 5" (1 562 m)   Wt 88 9 kg (196 lb)   SpO2 97%   BMI 36 43 kg/m²        Physical Exam     Physical Exam   Constitutional: She is oriented to person, place, and time  She appears well-developed and well-nourished  No distress  HENT:   Head: Normocephalic and atraumatic  Right Ear: Hearing, tympanic membrane, external ear and ear canal normal  No tenderness  No middle ear effusion  No decreased hearing is noted  Left Ear: Hearing, tympanic membrane, external ear and ear canal normal    Nose: Mucosal edema and rhinorrhea present  Right sinus exhibits no maxillary sinus tenderness and no frontal sinus tenderness  Left sinus exhibits no maxillary sinus tenderness and no frontal sinus tenderness  Mouth/Throat: Mucous membranes are normal  No uvula swelling  Posterior oropharyngeal erythema present  No oropharyngeal exudate, posterior oropharyngeal edema or tonsillar abscesses  Post nasal drip present   Eyes: Pupils are equal, round, and reactive to light  Conjunctivae and EOM are normal  Right eye exhibits no discharge  Left eye exhibits no discharge  No scleral icterus  Neck: Normal range of motion  Neck supple  Cardiovascular: Normal rate, regular rhythm and normal heart sounds  Exam reveals no gallop and no friction rub  No murmur heard  Pulmonary/Chest: Effort normal and breath sounds normal  No respiratory distress  She has no wheezes  She has no rales  Musculoskeletal: Normal range of motion  Lymphadenopathy:     She has no cervical adenopathy  Neurological: She is alert and oriented to person, place, and time  No cranial nerve deficit  Skin: Skin is warm and dry  No rash noted  She is not diaphoretic  No erythema  No pallor

## 2024-01-03 DIAGNOSIS — M25.562 BILATERAL CHRONIC KNEE PAIN: ICD-10-CM

## 2024-01-03 DIAGNOSIS — G89.29 BILATERAL CHRONIC KNEE PAIN: ICD-10-CM

## 2024-01-03 DIAGNOSIS — M25.561 BILATERAL CHRONIC KNEE PAIN: ICD-10-CM

## 2024-01-03 RX ORDER — TRAMADOL HYDROCHLORIDE 50 MG/1
50 TABLET ORAL EVERY 8 HOURS PRN
Qty: 90 TABLET | Refills: 0 | Status: SHIPPED | OUTPATIENT
Start: 2024-01-03 | End: 2024-02-02

## 2024-02-09 DIAGNOSIS — M25.561 BILATERAL CHRONIC KNEE PAIN: ICD-10-CM

## 2024-02-09 DIAGNOSIS — G89.29 BILATERAL CHRONIC KNEE PAIN: ICD-10-CM

## 2024-02-09 DIAGNOSIS — M25.562 BILATERAL CHRONIC KNEE PAIN: ICD-10-CM

## 2024-02-12 RX ORDER — TRAMADOL HYDROCHLORIDE 50 MG/1
50 TABLET ORAL
Qty: 90 TABLET | Refills: 0 | OUTPATIENT
Start: 2024-02-12

## 2024-02-15 DIAGNOSIS — M25.562 BILATERAL CHRONIC KNEE PAIN: ICD-10-CM

## 2024-02-15 DIAGNOSIS — M25.561 BILATERAL CHRONIC KNEE PAIN: ICD-10-CM

## 2024-02-15 DIAGNOSIS — G89.29 BILATERAL CHRONIC KNEE PAIN: ICD-10-CM

## 2024-02-15 RX ORDER — TRAMADOL HYDROCHLORIDE 50 MG/1
50 TABLET ORAL EVERY 8 HOURS PRN
Qty: 90 TABLET | Refills: 0 | Status: SHIPPED | OUTPATIENT
Start: 2024-02-15 | End: 2024-03-16

## 2024-02-15 NOTE — TELEPHONE ENCOUNTER
Pt called requesting a refill of tramadol sent to the [facility] pharmacy on [address]

## 2024-03-02 ENCOUNTER — HOSPITAL ENCOUNTER (OUTPATIENT)
Age: 54
Discharge: HOME | End: 2024-03-02
Payer: COMMERCIAL

## 2024-03-02 DIAGNOSIS — E11.9 TYPE 2 DIABETES MELLITUS WITHOUT COMPLICATION, WITHOUT LONG-TERM CURRENT USE OF INSULIN (HCC): ICD-10-CM

## 2024-03-02 DIAGNOSIS — E03.9 ACQUIRED HYPOTHYROIDISM: ICD-10-CM

## 2024-03-02 LAB
ALBUMIN SERPL-MCNC: 4.1 G/DL (ref 3.5–5.2)
ALP SERPL-CCNC: 66 U/L (ref 35–104)
ALT SERPL-CCNC: 13 U/L (ref 0–32)
ANION GAP SERPL CALCULATED.3IONS-SCNC: 8 MMOL/L (ref 7–16)
AST SERPL-CCNC: 12 U/L (ref 0–31)
BASOPHILS # BLD: 0.02 K/UL (ref 0–0.2)
BASOPHILS NFR BLD: 0 % (ref 0–2)
BILIRUB SERPL-MCNC: 0.6 MG/DL (ref 0–1.2)
BUN SERPL-MCNC: 10 MG/DL (ref 6–20)
CALCIUM SERPL-MCNC: 9.6 MG/DL (ref 8.6–10.2)
CHLORIDE SERPL-SCNC: 103 MMOL/L (ref 98–107)
CHOLEST SERPL-MCNC: 137 MG/DL
CO2 SERPL-SCNC: 31 MMOL/L (ref 22–29)
CREAT SERPL-MCNC: 0.7 MG/DL (ref 0.5–1)
CREAT UR-MCNC: 113.5 MG/DL (ref 29–226)
EOSINOPHIL # BLD: 0.14 K/UL (ref 0.05–0.5)
EOSINOPHILS RELATIVE PERCENT: 2 % (ref 0–6)
ERYTHROCYTE [DISTWIDTH] IN BLOOD BY AUTOMATED COUNT: 13.2 % (ref 11.5–15)
GFR SERPL CREATININE-BSD FRML MDRD: >60 ML/MIN/1.73M2
GLUCOSE SERPL-MCNC: 122 MG/DL (ref 74–99)
HBA1C MFR BLD: 6.1 % (ref 4–5.6)
HCT VFR BLD AUTO: 40.2 % (ref 34–48)
HDLC SERPL-MCNC: 57 MG/DL
HGB BLD-MCNC: 12.7 G/DL (ref 11.5–15.5)
IMM GRANULOCYTES # BLD AUTO: <0.03 K/UL (ref 0–0.58)
IMM GRANULOCYTES NFR BLD: 0 % (ref 0–5)
LDLC SERPL CALC-MCNC: 61 MG/DL
LYMPHOCYTES NFR BLD: 1.39 K/UL (ref 1.5–4)
LYMPHOCYTES RELATIVE PERCENT: 20 % (ref 20–42)
MCH RBC QN AUTO: 28.5 PG (ref 26–35)
MCHC RBC AUTO-ENTMCNC: 31.6 G/DL (ref 32–34.5)
MCV RBC AUTO: 90.3 FL (ref 80–99.9)
MICROALBUMIN UR-MCNC: <12 MG/L (ref 0–19)
MICROALBUMIN/CREAT UR-RTO: NORMAL MCG/MG CREAT (ref 0–30)
MONOCYTES NFR BLD: 0.41 K/UL (ref 0.1–0.95)
MONOCYTES NFR BLD: 6 % (ref 2–12)
NEUTROPHILS NFR BLD: 72 % (ref 43–80)
NEUTS SEG NFR BLD: 5.04 K/UL (ref 1.8–7.3)
PLATELET # BLD AUTO: 206 K/UL (ref 130–450)
PMV BLD AUTO: 10.5 FL (ref 7–12)
POTASSIUM SERPL-SCNC: 4.4 MMOL/L (ref 3.5–5)
PROT SERPL-MCNC: 7.1 G/DL (ref 6.4–8.3)
RBC # BLD AUTO: 4.45 M/UL (ref 3.5–5.5)
SODIUM SERPL-SCNC: 142 MMOL/L (ref 132–146)
TRIGL SERPL-MCNC: 94 MG/DL
TSH SERPL DL<=0.05 MIU/L-ACNC: 2.54 UIU/ML (ref 0.27–4.2)
VLDLC SERPL CALC-MCNC: 19 MG/DL
WBC OTHER # BLD: 7 K/UL (ref 4.5–11.5)

## 2024-03-02 PROCEDURE — 82043 UR ALBUMIN QUANTITATIVE: CPT

## 2024-03-02 PROCEDURE — 85025 COMPLETE CBC W/AUTO DIFF WBC: CPT

## 2024-03-02 PROCEDURE — 80061 LIPID PANEL: CPT

## 2024-03-02 PROCEDURE — 83036 HEMOGLOBIN GLYCOSYLATED A1C: CPT

## 2024-03-02 PROCEDURE — 82570 ASSAY OF URINE CREATININE: CPT

## 2024-03-02 PROCEDURE — 84443 ASSAY THYROID STIM HORMONE: CPT

## 2024-03-02 PROCEDURE — 80053 COMPREHEN METABOLIC PANEL: CPT

## 2024-03-03 DIAGNOSIS — E11.40 TYPE 2 DIABETES MELLITUS WITH DIABETIC NEUROPATHY, WITHOUT LONG-TERM CURRENT USE OF INSULIN (HCC): ICD-10-CM

## 2024-03-03 DIAGNOSIS — M25.561 BILATERAL CHRONIC KNEE PAIN: ICD-10-CM

## 2024-03-03 DIAGNOSIS — G89.29 BILATERAL CHRONIC KNEE PAIN: ICD-10-CM

## 2024-03-03 DIAGNOSIS — M25.562 BILATERAL CHRONIC KNEE PAIN: ICD-10-CM

## 2024-03-04 RX ORDER — GABAPENTIN 100 MG/1
CAPSULE ORAL
Qty: 180 CAPSULE | Refills: 0 | Status: SHIPPED | OUTPATIENT
Start: 2024-03-04 | End: 2024-06-02

## 2024-03-04 RX ORDER — CELECOXIB 200 MG/1
CAPSULE ORAL
Qty: 180 CAPSULE | Refills: 0 | Status: SHIPPED | OUTPATIENT
Start: 2024-03-04

## 2024-03-04 NOTE — TELEPHONE ENCOUNTER
Medication Refill Request    LOV 9/8/2023  NOV 3/8/2024    Lab Results   Component Value Date    CREATININE 0.7 03/02/2024

## 2024-03-08 ENCOUNTER — OFFICE VISIT (OUTPATIENT)
Dept: FAMILY MEDICINE CLINIC | Age: 54
End: 2024-03-08
Payer: COMMERCIAL

## 2024-03-08 VITALS
WEIGHT: 293 LBS | DIASTOLIC BLOOD PRESSURE: 80 MMHG | SYSTOLIC BLOOD PRESSURE: 120 MMHG | OXYGEN SATURATION: 97 % | BODY MASS INDEX: 52.48 KG/M2 | HEART RATE: 73 BPM | TEMPERATURE: 97.7 F

## 2024-03-08 DIAGNOSIS — M25.561 BILATERAL CHRONIC KNEE PAIN: ICD-10-CM

## 2024-03-08 DIAGNOSIS — G89.29 BILATERAL CHRONIC KNEE PAIN: ICD-10-CM

## 2024-03-08 DIAGNOSIS — M25.562 BILATERAL CHRONIC KNEE PAIN: ICD-10-CM

## 2024-03-08 DIAGNOSIS — Z23 NEED FOR PNEUMOCOCCAL VACCINATION: ICD-10-CM

## 2024-03-08 DIAGNOSIS — E03.9 ACQUIRED HYPOTHYROIDISM: ICD-10-CM

## 2024-03-08 DIAGNOSIS — E11.9 DIET-CONTROLLED DIABETES MELLITUS (HCC): Primary | ICD-10-CM

## 2024-03-08 DIAGNOSIS — Z23 NEED FOR TDAP VACCINATION: ICD-10-CM

## 2024-03-08 PROCEDURE — 90715 TDAP VACCINE 7 YRS/> IM: CPT | Performed by: FAMILY MEDICINE

## 2024-03-08 PROCEDURE — 3044F HG A1C LEVEL LT 7.0%: CPT | Performed by: FAMILY MEDICINE

## 2024-03-08 PROCEDURE — 90677 PCV20 VACCINE IM: CPT | Performed by: FAMILY MEDICINE

## 2024-03-08 PROCEDURE — 3079F DIAST BP 80-89 MM HG: CPT | Performed by: FAMILY MEDICINE

## 2024-03-08 PROCEDURE — 2022F DILAT RTA XM EVC RTNOPTHY: CPT | Performed by: FAMILY MEDICINE

## 2024-03-08 PROCEDURE — 90471 IMMUNIZATION ADMIN: CPT | Performed by: FAMILY MEDICINE

## 2024-03-08 PROCEDURE — G8417 CALC BMI ABV UP PARAM F/U: HCPCS | Performed by: FAMILY MEDICINE

## 2024-03-08 PROCEDURE — 3017F COLORECTAL CA SCREEN DOC REV: CPT | Performed by: FAMILY MEDICINE

## 2024-03-08 PROCEDURE — 99214 OFFICE O/P EST MOD 30 MIN: CPT | Performed by: FAMILY MEDICINE

## 2024-03-08 PROCEDURE — 90472 IMMUNIZATION ADMIN EACH ADD: CPT | Performed by: FAMILY MEDICINE

## 2024-03-08 PROCEDURE — G8484 FLU IMMUNIZE NO ADMIN: HCPCS | Performed by: FAMILY MEDICINE

## 2024-03-08 PROCEDURE — 3074F SYST BP LT 130 MM HG: CPT | Performed by: FAMILY MEDICINE

## 2024-03-08 PROCEDURE — 1036F TOBACCO NON-USER: CPT | Performed by: FAMILY MEDICINE

## 2024-03-08 PROCEDURE — G8427 DOCREV CUR MEDS BY ELIG CLIN: HCPCS | Performed by: FAMILY MEDICINE

## 2024-03-08 RX ORDER — TRAMADOL HYDROCHLORIDE 50 MG/1
50 TABLET ORAL EVERY 8 HOURS PRN
Qty: 90 TABLET | Refills: 2 | Status: SHIPPED | OUTPATIENT
Start: 2024-03-15 | End: 2024-06-13

## 2024-03-08 SDOH — ECONOMIC STABILITY: FOOD INSECURITY: WITHIN THE PAST 12 MONTHS, YOU WORRIED THAT YOUR FOOD WOULD RUN OUT BEFORE YOU GOT MONEY TO BUY MORE.: SOMETIMES TRUE

## 2024-03-08 SDOH — ECONOMIC STABILITY: FOOD INSECURITY: WITHIN THE PAST 12 MONTHS, THE FOOD YOU BOUGHT JUST DIDN'T LAST AND YOU DIDN'T HAVE MONEY TO GET MORE.: SOMETIMES TRUE

## 2024-03-08 SDOH — ECONOMIC STABILITY: INCOME INSECURITY: HOW HARD IS IT FOR YOU TO PAY FOR THE VERY BASICS LIKE FOOD, HOUSING, MEDICAL CARE, AND HEATING?: VERY HARD

## 2024-03-08 ASSESSMENT — PATIENT HEALTH QUESTIONNAIRE - PHQ9
1. LITTLE INTEREST OR PLEASURE IN DOING THINGS: 0
SUM OF ALL RESPONSES TO PHQ QUESTIONS 1-9: 0
SUM OF ALL RESPONSES TO PHQ9 QUESTIONS 1 & 2: 0
SUM OF ALL RESPONSES TO PHQ QUESTIONS 1-9: 0
2. FEELING DOWN, DEPRESSED OR HOPELESS: 0
SUM OF ALL RESPONSES TO PHQ QUESTIONS 1-9: 0
SUM OF ALL RESPONSES TO PHQ QUESTIONS 1-9: 0

## 2024-03-08 ASSESSMENT — LIFESTYLE VARIABLES
HOW MANY STANDARD DRINKS CONTAINING ALCOHOL DO YOU HAVE ON A TYPICAL DAY: PATIENT DOES NOT DRINK
HOW OFTEN DO YOU HAVE A DRINK CONTAINING ALCOHOL: NEVER

## 2024-03-08 NOTE — PROGRESS NOTES
Glaucoma Mother     Heart Attack Father 42    Drug Abuse Sister         methamphetamine    Heart Disease Brother     Gout Brother     Pancreatic Cancer Maternal Uncle         half brother    Prostate Cancer Paternal Uncle     Diabetes Maternal Grandmother     Alcohol Abuse Maternal Grandfather     Lung Cancer Maternal Grandfather     Stroke Paternal Grandmother     Heart Attack Paternal Grandfather        Social History:  Social History     Tobacco Use    Smoking status: Never    Smokeless tobacco: Never   Substance Use Topics    Alcohol use: Not Currently     Comment: rare       BP Readings from Last 3 Encounters:   03/08/24 120/80   09/08/23 110/78   07/17/23 126/77       VS:  /80 (Site: Left Upper Arm, Position: Sitting, Cuff Size: Large Adult)   Pulse 73   Temp 97.7 °F (36.5 °C)   Wt (!) 161.2 kg (355 lb 6.4 oz)   SpO2 97%   BMI 52.48 kg/m²     Physical Exam  Vitals reviewed.   Constitutional:       General: She is awake. She is not in acute distress.     Appearance: She is not ill-appearing, toxic-appearing or diaphoretic.   Neck:      Vascular: No carotid bruit.   Cardiovascular:      Rate and Rhythm: Normal rate and regular rhythm.      Heart sounds: S1 normal and S2 normal. No murmur heard.  Pulmonary:      Breath sounds: No decreased breath sounds, wheezing, rhonchi or rales.   Abdominal:      General: Bowel sounds are normal. There is no distension.      Palpations: Abdomen is soft.      Tenderness: There is no abdominal tenderness. There is no guarding or rebound.   Musculoskeletal:      Cervical back: Neck supple.      Right lower leg: No tenderness. No edema.      Left lower leg: No tenderness. No edema.   Skin:     General: Skin is warm and dry.   Neurological:      General: No focal deficit present.      Mental Status: She is alert.   Psychiatric:         Attention and Perception: Attention and perception normal.         Mood and Affect: Mood and affect normal.         Speech: Speech

## 2024-03-08 NOTE — PATIENT INSTRUCTIONS
Program  Phone: [phone]  Address: [address] [place] [address]      [facility] Food Resources*  (call 211 for more resources)     HELP NETWORK OF [facility]:  What they do: Provides 24-hr, 7 days a week access to information on community resources for food & clothing help. [facility] AND [facility]  Phone: 211 or 476-306-5995  Text “HELP NETWORK” to 740330 for local food resources  DEPARTMENT OF JOB AND FAMILY SERVICES:  What they do: SNAP, provide a card to use like cash to purchase healthy foods at approved retailers  [facility]  Phone: [phone], [phone]  [facility]  Phone: [phone]  [facility]  Phone: [phone]  Website: [place]fs.[place].[facility]: [address] [place] [address]  What they offer: Food and clothing distribution on Tuesdays from 9 am to 12pm & Thursdays from 4pm to 7pm.   Phone Number: [phone] ext. 503  Website: www.[employer]   [facility]  What they offer: food items that stop at various housing and community services organizations, follow a month schedule.  Call to learn more.  Phone Number: [phone]      [facility] KITCHEN: [address] [place] [address]  What they offer: Serves breakfast and lunch daily, 7AM - 9AM and 11 AM - 1PM, (close Sundays)  Contact: [phone]; [address] [place] [address]  [employer]: [address] [place] [address]  What they offer: Hot evening meals  Phone Number: [phone]  Website: [employer]    [employer] FAMILY [employer]ICE:  What they offer: Emergency food assistance  Phone number: [phone]  Website: [employer]  [employer]:  What they offer: Frozen meals for private pay, government funded programs and some insurances   Phone Number: [phone]  Website: [employer]  [employer]ELS ([facility]):  What they offer: Hot dinner and cold lunch

## 2024-03-11 LAB
6-MONOACETYLMORPHINE, URINE: NEGATIVE
ABNORMAL SPECIMEN VALIDITY TEST: ABNORMAL
ALCOHOL URINE: NOT DETECTED MG/DL
AMPHETAMINE SCREEN URINE: NEGATIVE
BARBITURATE SCREEN URINE: NEGATIVE
BENZODIAZEPINE SCREEN, URINE: NEGATIVE
BUPRENORPHINE URINE: NEGATIVE
CANNABINOID SCREEN URINE: NEGATIVE
COCAINE METABOLITE, URINE: NEGATIVE
FENTANYL URINE: NEGATIVE
INTEGRITY CHECK, CREATININE, URINE: 136.9 MG/DL (ref 22–250)
INTEGRITY CHECK, OXIDANT, URINE: <40 MG/L
INTEGRITY CHECK, PH, URINE: 5.7 (ref 4.5–9)
INTEGRITY CHECK, SPECIFIC GRAVITY, URINE: 1.03 (ref 1–1.03)
METHADONE SCREEN, URINE: NEGATIVE
OPIATES, URINE: NEGATIVE
OXYCODONE SCREEN URINE: NEGATIVE
PHENCYCLIDINE, URINE: NEGATIVE
TEST INFORMATION: ABNORMAL
TRAMADOL, URINE: POSITIVE

## 2024-03-12 LAB
COMPLIANCE DRUG ANALYSIS, URINE: NORMAL
O-DESMETHYLTRAMADOL, QUANTITATIVE, URINE: >1000 NG/ML
TRAMADOL,UR,QN: >1000 NG/ML

## 2024-03-27 NOTE — PATIENT INSTRUCTIONS
I suspect this is all osteoarthritis but will need further workup    Try celebrex one tab twice per day as needed with food    OK to try voltaren gel up to 4 times per day as needed Name band;

## 2024-05-04 ENCOUNTER — APPOINTMENT (OUTPATIENT)
Dept: CT IMAGING | Age: 54
End: 2024-05-04

## 2024-05-04 ENCOUNTER — HOSPITAL ENCOUNTER (EMERGENCY)
Age: 54
Discharge: HOME | End: 2024-05-04
Attending: STUDENT IN AN ORGANIZED HEALTH CARE EDUCATION/TRAINING PROGRAM

## 2024-05-04 VITALS
OXYGEN SATURATION: 100 % | TEMPERATURE: 97 F | BODY MASS INDEX: 43.4 KG/M2 | HEART RATE: 82 BPM | WEIGHT: 293 LBS | HEIGHT: 69 IN | DIASTOLIC BLOOD PRESSURE: 81 MMHG | RESPIRATION RATE: 20 BRPM | SYSTOLIC BLOOD PRESSURE: 142 MMHG

## 2024-05-04 DIAGNOSIS — R93.89 ABNORMAL CT SCAN: ICD-10-CM

## 2024-05-04 DIAGNOSIS — N20.0 KIDNEY STONE: Primary | ICD-10-CM

## 2024-05-04 LAB
ALBUMIN SERPL-MCNC: 4.1 G/DL (ref 3.5–5.2)
ALP SERPL-CCNC: 63 U/L (ref 35–104)
ALT SERPL-CCNC: 16 U/L (ref 0–32)
ANION GAP SERPL CALCULATED.3IONS-SCNC: 6 MMOL/L (ref 7–16)
AST SERPL-CCNC: 13 U/L (ref 0–31)
BACTERIA URNS QL MICRO: ABNORMAL
BASOPHILS # BLD: 0.03 K/UL (ref 0–0.2)
BASOPHILS NFR BLD: 0 % (ref 0–2)
BILIRUB SERPL-MCNC: 0.6 MG/DL (ref 0–1.2)
BILIRUB UR QL STRIP: NEGATIVE
BUN SERPL-MCNC: 17 MG/DL (ref 6–20)
CALCIUM SERPL-MCNC: 9 MG/DL (ref 8.6–10.2)
CHLORIDE SERPL-SCNC: 105 MMOL/L (ref 98–107)
CLARITY UR: ABNORMAL
CO2 SERPL-SCNC: 31 MMOL/L (ref 22–29)
COLOR UR: YELLOW
CREAT SERPL-MCNC: 0.8 MG/DL (ref 0.5–1)
EOSINOPHIL # BLD: 0.13 K/UL (ref 0.05–0.5)
EOSINOPHILS RELATIVE PERCENT: 2 % (ref 0–6)
ERYTHROCYTE [DISTWIDTH] IN BLOOD BY AUTOMATED COUNT: 13.5 % (ref 11.5–15)
GFR, ESTIMATED: >90 ML/MIN/1.73M2
GLUCOSE SERPL-MCNC: 170 MG/DL (ref 74–99)
GLUCOSE UR STRIP-MCNC: NEGATIVE MG/DL
HCT VFR BLD AUTO: 37.9 % (ref 34–48)
HGB BLD-MCNC: 12.2 G/DL (ref 11.5–15.5)
HGB UR QL STRIP.AUTO: ABNORMAL
IMM GRANULOCYTES # BLD AUTO: <0.03 K/UL (ref 0–0.58)
IMM GRANULOCYTES NFR BLD: 0 % (ref 0–5)
KETONES UR STRIP-MCNC: NEGATIVE MG/DL
LACTATE BLDV-SCNC: 1 MMOL/L (ref 0.5–2.2)
LEUKOCYTE ESTERASE UR QL STRIP: ABNORMAL
LIPASE SERPL-CCNC: 20 U/L (ref 13–60)
LYMPHOCYTES NFR BLD: 1.74 K/UL (ref 1.5–4)
LYMPHOCYTES RELATIVE PERCENT: 21 % (ref 20–42)
MCH RBC QN AUTO: 28.8 PG (ref 26–35)
MCHC RBC AUTO-ENTMCNC: 32.2 G/DL (ref 32–34.5)
MCV RBC AUTO: 89.4 FL (ref 80–99.9)
MONOCYTES NFR BLD: 0.6 K/UL (ref 0.1–0.95)
MONOCYTES NFR BLD: 7 % (ref 2–12)
NEUTROPHILS NFR BLD: 70 % (ref 43–80)
NEUTS SEG NFR BLD: 5.9 K/UL (ref 1.8–7.3)
NITRITE UR QL STRIP: NEGATIVE
PH UR STRIP: 6 [PH] (ref 5–9)
PLATELET # BLD AUTO: 194 K/UL (ref 130–450)
PMV BLD AUTO: 10.1 FL (ref 7–12)
POTASSIUM SERPL-SCNC: 4.2 MMOL/L (ref 3.5–5)
PROT SERPL-MCNC: 6.9 G/DL (ref 6.4–8.3)
PROT UR STRIP-MCNC: NEGATIVE MG/DL
RBC # BLD AUTO: 4.24 M/UL (ref 3.5–5.5)
RBC #/AREA URNS HPF: ABNORMAL /HPF
SODIUM SERPL-SCNC: 142 MMOL/L (ref 132–146)
SP GR UR STRIP: >1.03 (ref 1–1.03)
UROBILINOGEN UR STRIP-ACNC: 0.2 EU/DL (ref 0–1)
WBC #/AREA URNS HPF: ABNORMAL /HPF
WBC OTHER # BLD: 8.4 K/UL (ref 4.5–11.5)

## 2024-05-04 PROCEDURE — 80053 COMPREHEN METABOLIC PANEL: CPT

## 2024-05-04 PROCEDURE — 2580000003 HC RX 258: Performed by: STUDENT IN AN ORGANIZED HEALTH CARE EDUCATION/TRAINING PROGRAM

## 2024-05-04 PROCEDURE — 99285 EMERGENCY DEPT VISIT HI MDM: CPT

## 2024-05-04 PROCEDURE — 96375 TX/PRO/DX INJ NEW DRUG ADDON: CPT

## 2024-05-04 PROCEDURE — 81001 URINALYSIS AUTO W/SCOPE: CPT

## 2024-05-04 PROCEDURE — 6360000004 HC RX CONTRAST MEDICATION: Performed by: RADIOLOGY

## 2024-05-04 PROCEDURE — 87086 URINE CULTURE/COLONY COUNT: CPT

## 2024-05-04 PROCEDURE — 96374 THER/PROPH/DIAG INJ IV PUSH: CPT

## 2024-05-04 PROCEDURE — 74177 CT ABD & PELVIS W/CONTRAST: CPT

## 2024-05-04 PROCEDURE — 85025 COMPLETE CBC W/AUTO DIFF WBC: CPT

## 2024-05-04 PROCEDURE — 83690 ASSAY OF LIPASE: CPT

## 2024-05-04 PROCEDURE — 83605 ASSAY OF LACTIC ACID: CPT

## 2024-05-04 PROCEDURE — 6360000002 HC RX W HCPCS: Performed by: STUDENT IN AN ORGANIZED HEALTH CARE EDUCATION/TRAINING PROGRAM

## 2024-05-04 RX ORDER — OXYCODONE HYDROCHLORIDE AND ACETAMINOPHEN 5; 325 MG/1; MG/1
1 TABLET ORAL EVERY 6 HOURS PRN
Qty: 12 TABLET | Refills: 0 | Status: SHIPPED | OUTPATIENT
Start: 2024-05-04 | End: 2024-05-07

## 2024-05-04 RX ORDER — ONDANSETRON 2 MG/ML
4 INJECTION INTRAMUSCULAR; INTRAVENOUS ONCE
Status: COMPLETED | OUTPATIENT
Start: 2024-05-04 | End: 2024-05-04

## 2024-05-04 RX ORDER — TAMSULOSIN HYDROCHLORIDE 0.4 MG/1
0.4 CAPSULE ORAL DAILY
Qty: 30 CAPSULE | Refills: 0 | Status: SHIPPED | OUTPATIENT
Start: 2024-05-04

## 2024-05-04 RX ORDER — ONDANSETRON 4 MG/1
4 TABLET, FILM COATED ORAL EVERY 8 HOURS PRN
Qty: 20 TABLET | Refills: 0 | Status: SHIPPED | OUTPATIENT
Start: 2024-05-04

## 2024-05-04 RX ORDER — KETOROLAC TROMETHAMINE 10 MG/1
10 TABLET, FILM COATED ORAL EVERY 6 HOURS PRN
Qty: 20 TABLET | Refills: 0 | Status: SHIPPED | OUTPATIENT
Start: 2024-05-04 | End: 2024-05-09

## 2024-05-04 RX ORDER — KETOROLAC TROMETHAMINE 15 MG/ML
15 INJECTION, SOLUTION INTRAMUSCULAR; INTRAVENOUS ONCE
Status: COMPLETED | OUTPATIENT
Start: 2024-05-04 | End: 2024-05-04

## 2024-05-04 RX ORDER — CEFDINIR 300 MG/1
300 CAPSULE ORAL 2 TIMES DAILY
Qty: 14 CAPSULE | Refills: 0 | Status: SHIPPED | OUTPATIENT
Start: 2024-05-04 | End: 2024-05-11

## 2024-05-04 RX ORDER — 0.9 % SODIUM CHLORIDE 0.9 %
1000 INTRAVENOUS SOLUTION INTRAVENOUS ONCE
Status: COMPLETED | OUTPATIENT
Start: 2024-05-04 | End: 2024-05-04

## 2024-05-04 RX ADMIN — WATER 2000 MG: 1 INJECTION INTRAMUSCULAR; INTRAVENOUS; SUBCUTANEOUS at 04:50

## 2024-05-04 RX ADMIN — SODIUM CHLORIDE 1000 ML: 9 INJECTION, SOLUTION INTRAVENOUS at 03:41

## 2024-05-04 RX ADMIN — ONDANSETRON 4 MG: 2 INJECTION INTRAMUSCULAR; INTRAVENOUS at 03:43

## 2024-05-04 RX ADMIN — KETOROLAC TROMETHAMINE 15 MG: 15 INJECTION, SOLUTION INTRAMUSCULAR; INTRAVENOUS at 03:42

## 2024-05-04 RX ADMIN — IOPAMIDOL 75 ML: 755 INJECTION, SOLUTION INTRAVENOUS at 05:53

## 2024-05-04 ASSESSMENT — PAIN DESCRIPTION - DESCRIPTORS: DESCRIPTORS: SHARP

## 2024-05-04 ASSESSMENT — LIFESTYLE VARIABLES
HOW OFTEN DO YOU HAVE A DRINK CONTAINING ALCOHOL: NEVER
HOW MANY STANDARD DRINKS CONTAINING ALCOHOL DO YOU HAVE ON A TYPICAL DAY: PATIENT DOES NOT DRINK

## 2024-05-04 ASSESSMENT — PAIN - FUNCTIONAL ASSESSMENT: PAIN_FUNCTIONAL_ASSESSMENT: 0-10

## 2024-05-04 ASSESSMENT — PAIN SCALES - GENERAL: PAINLEVEL_OUTOF10: 6

## 2024-05-04 ASSESSMENT — PAIN DESCRIPTION - ORIENTATION: ORIENTATION: LOWER

## 2024-05-04 ASSESSMENT — PAIN DESCRIPTION - LOCATION: LOCATION: ABDOMEN

## 2024-05-04 NOTE — ED PROVIDER NOTES
[facility] EMERGENCY DEPARTMENT  EMERGENCY DEPARTMENT ENCOUNTER        Pt Name: Yaz Langford  MRN: [id number]  Birthdate 1970  Date of evaluation: 2024  Provider: Harriett Green DO  PCP: Marlene Mccollum DO  Note Started: 3:13 AM EDT 24    CHIEF COMPLAINT       Chief Complaint   Patient presents with    Abdominal Pain     Constant right lower abd pain; sharp in nature starting at 0217; c/o nausea       HISTORY OF PRESENT ILLNESS: 1 or more Elements   History From: patient    Limitations to history : None    Yaz Langford is a 53 y.o. female with a history of type 2 diabetes, hypothyroidism, PCOS presenting to the emergency department complaining of abdominal pain.  Patient states that she has been experiencing constant right lower quadrant abdominal pain.  Described as sharp in nature.  States that it started around 215 this evening.  Patient describes it as a 12 out of 10.  Patient describes as throbbing in nature.  She states that she was nauseated secondary to the pain. Patient denies any fevers, chills, vomiting, diarrhea, hematuria, vaginal bleeding, vaginal discharge, lightheadedness, dizziness, syncope, recent hospitalization, recent illness, or other acute symptoms or concerns.    Nursing Notes were all reviewed and agreed with or any disagreements were addressed in the HPI.    REVIEW OF SYSTEMS :      Review of Systems    Positives and Pertinent negatives as per HPI.     SURGICAL HISTORY     Past Surgical History:   Procedure Laterality Date    ANKLE SURGERY Bilateral      SECTION N/A     x 1    COLONOSCOPY N/A 2022    COLONOSCOPY POLYPECTOMY SNARE/COLD BIOPSY performed by Nikia Velazquez MD at [facility] ENDOSCOPY    CYST REMOVAL N/A     chest    KIDNEY STONE REMOVAL      UMBILICAL HERNIA REPAIR N/A     x 2       CURRENTMEDICATIONS       Discharge Medication List as of 2024  6:36 AM        CONTINUE these medications which have  found.    PROCEDURES   Unless otherwise noted below, none     Procedures    CRITICAL CARE TIME (.cct)   0    PAST MEDICAL HISTORY/Chronic Conditions Affecting Care      has a past medical history of Basal cell carcinoma (BCC) of skin of nose, Chronic pain of both knees, Class 3 severe obesity due to excess calories with serious comorbidity and body mass index (BMI) of 50.0 to 59.9 in adult ([facility]), Diverticulitis, Diverticulosis (09/14/2022), History of DVT in adulthood, Hypothyroidism, Kidney stone, Mild intermittent asthma without complication, MELCHOR (obstructive sleep apnea), Osteoarthritis, PCOS (polycystic ovarian syndrome), Tubular adenoma of colon (09/14/2022), and Type 2 diabetes mellitus with diabetic polyneuropathy, without long-term current use of insulin ([facility]).     EMERGENCY DEPARTMENT COURSE    Vitals:    Vitals:    05/04/24 0256 05/04/24 0257   BP:  (!) 142/81   Pulse: 82    Resp: 20    Temp: 97 °F (36.1 °C)    SpO2: 100%    Weight: (!) 163.3 kg (360 lb)    Height: 1.753 m (5' 9\")        Patient was given the following medications:  Medications   sodium chloride 0.9 % bolus 1,000 mL (0 mLs IntraVENous Stopped 5/4/24 0424)   ondansetron (ZOFRAN) injection 4 mg (4 mg IntraVENous Given 5/4/24 0343)   ketorolac (TORADOL) injection 15 mg (15 mg IntraVENous Given 5/4/24 0342)   cefTRIAXone (ROCEPHIN) 2,000 mg in sterile water 20 mL IV syringe (2,000 mg IntraVENous Given 5/4/24 0450)   iopamidol (ISOVUE-370) 76 % injection 75 mL (75 mLs IntraVENous Given 5/4/24 0553)       ED Course as of 05/05/24 0401   Sat May 04, 2024   0632 Patient is a better in no acute distress [KG]      ED Course User Index  [KG] Harriett Green DO          Medical Decision Making/Differential Diagnosis:    CC/HPI Summary, Social Determinants of health, Records Reviewed, DDx, testing done/not done, ED Course, Reassessment, disposition considerations/shared decision making with patient, consults, disposition:        The patient is a  53-year-old female presenting emergency department with abdominal pain.  Patient is hemodynamically stable, nontoxic, and in no acute distress.    Patient is a non-smoker and there are no known social detriment to her health.  Records reviewed and patient follow-up with her doctors office 3-8-2024 records reviewed from this visit.    Differential diagnose includes not limited to kidney stone versus back strain versus UTI versus pyelonephritis versus bowel obstruction versus intra-abdominal abscess versus perforation.    CMP does not show significant acute abnormal findings.  CBC is reassuring.  Urinalysis shows small leukocytes but negative nitrates.  There are 6 9 white blood cells present.  CT abdomen pelvis shows 5 mm stone at the UVJ along with female obstructing stone at the left upper pole and lower pole of the kidney itself.  Patient has evidence of diverticulosis.  Patient has a liver mass as well which is an incidental finding.    Patient with IV fluids in addition to IV Zofran and IV Toradol.  Patient was covered with IV antibiotics including IV Rocephin.  Patient was feeling much better upon reassessment.  Patient is afebrile and nonseptic in appearance.  Pain had resolved. ecommended close follow-up with family doctor and urology as needed.  Strict return precautions were given and patient discharged in stable condition.      CONSULTS: (Who and What was discussed)  None        I am the Primary Clinician of Record.    FINAL IMPRESSION      1. Kidney stone    2. Abnormal CT scan          DISPOSITION/PLAN     DISPOSITION Decision To Discharge 05/04/2024 06:32:13 AM      PATIENT REFERRED TO:  Marlene Mccollum DO  [address]  [address] [address] [phone]    Schedule an appointment as soon as possible for a visit       Madi Alvarado DO  [address] [facility] [address] [phone]    Schedule an appointment as soon as possible for a visit         DISCHARGE MEDICATIONS:  Discharge

## 2024-05-04 NOTE — DISCHARGE INSTRUCTIONS
Follow-up with urology as needed.  Return to the emergency department immediately if you develop a fever, any significant worsening pain, or other acute symptoms or concerns.

## 2024-05-05 LAB
MICROORGANISM SPEC CULT: ABNORMAL
MICROORGANISM SPEC CULT: ABNORMAL
SPECIMEN DESCRIPTION: ABNORMAL

## 2024-05-08 ENCOUNTER — TELEPHONE (OUTPATIENT)
Dept: FAMILY MEDICINE CLINIC | Age: 54
End: 2024-05-08

## 2024-05-08 NOTE — TELEPHONE ENCOUNTER
[facility] ED Follow up Call    Reason for ED visit:  Abdominal Pain      Hi, this message is for  Yaz Langford    This is Justice from Marlene Whiteside office. Just calling to see how you are doing after your recent visit to the Emergency Room. Marlene Whiteside wants to make sure you were able to fill any prescriptions and that you understand your discharge instructions. Please return our call if you need to make a follow up appointment with your provider or have any further needs.   Our phone number is [phone]. Have a great day.

## 2024-05-12 DIAGNOSIS — E03.9 ACQUIRED HYPOTHYROIDISM: ICD-10-CM

## 2024-05-13 RX ORDER — LEVOTHYROXINE SODIUM 0.07 MG/1
75 TABLET ORAL DAILY
Qty: 90 TABLET | Refills: 0 | Status: SHIPPED | OUTPATIENT
Start: 2024-05-13

## 2024-05-13 NOTE — TELEPHONE ENCOUNTER
Medication Refill Request    LOV 3/8/2024  NOV 6/10/2024    Lab Results   Component Value Date    CREATININE 0.8 05/04/2024

## 2024-05-26 DIAGNOSIS — M25.561 BILATERAL CHRONIC KNEE PAIN: ICD-10-CM

## 2024-05-26 DIAGNOSIS — M25.562 BILATERAL CHRONIC KNEE PAIN: ICD-10-CM

## 2024-05-26 DIAGNOSIS — E11.40 TYPE 2 DIABETES MELLITUS WITH DIABETIC NEUROPATHY, WITHOUT LONG-TERM CURRENT USE OF INSULIN (HCC): ICD-10-CM

## 2024-05-26 DIAGNOSIS — G89.29 BILATERAL CHRONIC KNEE PAIN: ICD-10-CM

## 2024-05-28 RX ORDER — GABAPENTIN 100 MG/1
CAPSULE ORAL
Qty: 180 CAPSULE | Refills: 0 | Status: SHIPPED | OUTPATIENT
Start: 2024-06-04 | End: 2024-09-02

## 2024-05-28 RX ORDER — CELECOXIB 200 MG/1
CAPSULE ORAL
Qty: 180 CAPSULE | Refills: 0 | Status: SHIPPED | OUTPATIENT
Start: 2024-05-28

## 2024-06-02 DIAGNOSIS — M25.562 BILATERAL CHRONIC KNEE PAIN: ICD-10-CM

## 2024-06-02 DIAGNOSIS — M25.561 BILATERAL CHRONIC KNEE PAIN: ICD-10-CM

## 2024-06-02 DIAGNOSIS — G89.29 BILATERAL CHRONIC KNEE PAIN: ICD-10-CM

## 2024-06-02 DIAGNOSIS — E11.40 TYPE 2 DIABETES MELLITUS WITH DIABETIC NEUROPATHY, WITHOUT LONG-TERM CURRENT USE OF INSULIN (HCC): ICD-10-CM

## 2024-06-03 RX ORDER — CELECOXIB 200 MG/1
200 CAPSULE ORAL 2 TIMES DAILY
Qty: 180 CAPSULE | Refills: 0 | Status: SHIPPED | OUTPATIENT
Start: 2024-06-03

## 2024-06-03 RX ORDER — GABAPENTIN 100 MG/1
200 CAPSULE ORAL NIGHTLY
Qty: 180 CAPSULE | Refills: 0 | Status: SHIPPED | OUTPATIENT
Start: 2024-06-03 | End: 2024-09-01

## 2024-06-08 ENCOUNTER — HOSPITAL ENCOUNTER (OUTPATIENT)
Age: 54
Discharge: HOME | End: 2024-06-08
Payer: COMMERCIAL

## 2024-06-08 DIAGNOSIS — E03.9 ACQUIRED HYPOTHYROIDISM: ICD-10-CM

## 2024-06-08 DIAGNOSIS — E11.9 DIET-CONTROLLED DIABETES MELLITUS (HCC): ICD-10-CM

## 2024-06-08 LAB
ALBUMIN SERPL-MCNC: 3.9 G/DL (ref 3.5–5.2)
ALP SERPL-CCNC: 61 U/L (ref 35–104)
ALT SERPL-CCNC: 16 U/L (ref 0–32)
ANION GAP SERPL CALCULATED.3IONS-SCNC: 10 MMOL/L (ref 7–16)
AST SERPL-CCNC: 13 U/L (ref 0–31)
BASOPHILS # BLD: 0.03 K/UL (ref 0–0.2)
BASOPHILS NFR BLD: 0 % (ref 0–2)
BILIRUB SERPL-MCNC: 0.6 MG/DL (ref 0–1.2)
BUN SERPL-MCNC: 18 MG/DL (ref 6–20)
CALCIUM SERPL-MCNC: 9.2 MG/DL (ref 8.6–10.2)
CHLORIDE SERPL-SCNC: 102 MMOL/L (ref 98–107)
CHOLEST SERPL-MCNC: 151 MG/DL
CO2 SERPL-SCNC: 28 MMOL/L (ref 22–29)
CREAT SERPL-MCNC: 0.8 MG/DL (ref 0.5–1)
EOSINOPHIL # BLD: 0.13 K/UL (ref 0.05–0.5)
EOSINOPHILS RELATIVE PERCENT: 2 % (ref 0–6)
ERYTHROCYTE [DISTWIDTH] IN BLOOD BY AUTOMATED COUNT: 13.4 % (ref 11.5–15)
GFR, ESTIMATED: >90 ML/MIN/1.73M2
GLUCOSE SERPL-MCNC: 126 MG/DL (ref 74–99)
HBA1C MFR BLD: 6.9 % (ref 4–5.6)
HCT VFR BLD AUTO: 39.2 % (ref 34–48)
HDLC SERPL-MCNC: 58 MG/DL
HGB BLD-MCNC: 12.5 G/DL (ref 11.5–15.5)
IMM GRANULOCYTES # BLD AUTO: 0.03 K/UL (ref 0–0.58)
IMM GRANULOCYTES NFR BLD: 0 % (ref 0–5)
LDLC SERPL CALC-MCNC: 75 MG/DL
LYMPHOCYTES NFR BLD: 1.59 K/UL (ref 1.5–4)
LYMPHOCYTES RELATIVE PERCENT: 22 % (ref 20–42)
MCH RBC QN AUTO: 28.7 PG (ref 26–35)
MCHC RBC AUTO-ENTMCNC: 31.9 G/DL (ref 32–34.5)
MCV RBC AUTO: 90.1 FL (ref 80–99.9)
MONOCYTES NFR BLD: 0.48 K/UL (ref 0.1–0.95)
MONOCYTES NFR BLD: 7 % (ref 2–12)
NEUTROPHILS NFR BLD: 69 % (ref 43–80)
NEUTS SEG NFR BLD: 4.97 K/UL (ref 1.8–7.3)
PLATELET # BLD AUTO: 211 K/UL (ref 130–450)
PMV BLD AUTO: 11.1 FL (ref 7–12)
POTASSIUM SERPL-SCNC: 4.5 MMOL/L (ref 3.5–5)
PROT SERPL-MCNC: 7.4 G/DL (ref 6.4–8.3)
RBC # BLD AUTO: 4.35 M/UL (ref 3.5–5.5)
SODIUM SERPL-SCNC: 140 MMOL/L (ref 132–146)
TRIGL SERPL-MCNC: 91 MG/DL
TSH SERPL DL<=0.05 MIU/L-ACNC: 3.05 UIU/ML (ref 0.27–4.2)
VLDLC SERPL CALC-MCNC: 18 MG/DL
WBC OTHER # BLD: 7.2 K/UL (ref 4.5–11.5)

## 2024-06-08 PROCEDURE — 36415 COLL VENOUS BLD VENIPUNCTURE: CPT

## 2024-06-08 PROCEDURE — 83036 HEMOGLOBIN GLYCOSYLATED A1C: CPT

## 2024-06-08 PROCEDURE — 80061 LIPID PANEL: CPT

## 2024-06-08 PROCEDURE — 80053 COMPREHEN METABOLIC PANEL: CPT

## 2024-06-08 PROCEDURE — 84443 ASSAY THYROID STIM HORMONE: CPT

## 2024-06-08 PROCEDURE — 85025 COMPLETE CBC W/AUTO DIFF WBC: CPT

## 2024-06-10 ENCOUNTER — OFFICE VISIT (OUTPATIENT)
Dept: FAMILY MEDICINE CLINIC | Age: 54
End: 2024-06-10
Payer: COMMERCIAL

## 2024-06-10 VITALS
HEART RATE: 79 BPM | TEMPERATURE: 97.6 F | BODY MASS INDEX: 43.4 KG/M2 | WEIGHT: 293 LBS | HEIGHT: 69 IN | DIASTOLIC BLOOD PRESSURE: 82 MMHG | OXYGEN SATURATION: 98 % | SYSTOLIC BLOOD PRESSURE: 134 MMHG

## 2024-06-10 DIAGNOSIS — M25.562 CHRONIC PAIN OF BOTH KNEES: ICD-10-CM

## 2024-06-10 DIAGNOSIS — E03.9 ACQUIRED HYPOTHYROIDISM: ICD-10-CM

## 2024-06-10 DIAGNOSIS — M25.561 CHRONIC PAIN OF BOTH KNEES: ICD-10-CM

## 2024-06-10 DIAGNOSIS — E11.42 DIABETIC POLYNEUROPATHY ASSOCIATED WITH TYPE 2 DIABETES MELLITUS (HCC): ICD-10-CM

## 2024-06-10 DIAGNOSIS — G89.29 CHRONIC PAIN OF BOTH KNEES: ICD-10-CM

## 2024-06-10 DIAGNOSIS — R07.9 CHEST PAIN, UNSPECIFIED TYPE: ICD-10-CM

## 2024-06-10 DIAGNOSIS — E11.42 TYPE 2 DIABETES MELLITUS WITH DIABETIC POLYNEUROPATHY, WITHOUT LONG-TERM CURRENT USE OF INSULIN (HCC): Primary | ICD-10-CM

## 2024-06-10 PROBLEM — E11.40 DIABETIC NEUROPATHY ASSOCIATED WITH TYPE 2 DIABETES MELLITUS (HCC): Status: ACTIVE | Noted: 2024-06-10

## 2024-06-10 PROBLEM — K57.92 DIVERTICULITIS: Status: RESOLVED | Noted: 2022-04-15 | Resolved: 2024-06-10

## 2024-06-10 PROBLEM — N20.0 KIDNEY STONE: Status: RESOLVED | Noted: 2022-04-15 | Resolved: 2024-06-10

## 2024-06-10 LAB
6-MONOACETYLMORPHINE, URINE: NEGATIVE
ABNORMAL SPECIMEN VALIDITY TEST: ABNORMAL
ALCOHOL URINE: NOT DETECTED MG/DL
AMPHETAMINE SCREEN URINE: NEGATIVE
BARBITURATE SCREEN URINE: NEGATIVE
BENZODIAZEPINE SCREEN, URINE: NEGATIVE
BUPRENORPHINE URINE: NEGATIVE
CANNABINOID SCREEN URINE: NEGATIVE
COCAINE METABOLITE, URINE: NEGATIVE
FENTANYL URINE: NEGATIVE
INTEGRITY CHECK, CREATININE, URINE: 114.7 MG/DL (ref 22–250)
INTEGRITY CHECK, OXIDANT, URINE: <40 MG/L
INTEGRITY CHECK, PH, URINE: 5.7 (ref 4.5–9)
INTEGRITY CHECK, SPECIFIC GRAVITY, URINE: 1.03 (ref 1–1.03)
METHADONE SCREEN, URINE: NEGATIVE
OPIATES, URINE: NEGATIVE
OXYCODONE SCREEN URINE: NEGATIVE
PCP,URINE: NEGATIVE
TEST INFORMATION: ABNORMAL
TRAMADOL, URINE: POSITIVE

## 2024-06-10 PROCEDURE — 93000 ELECTROCARDIOGRAM COMPLETE: CPT | Performed by: FAMILY MEDICINE

## 2024-06-10 PROCEDURE — 99214 OFFICE O/P EST MOD 30 MIN: CPT | Performed by: FAMILY MEDICINE

## 2024-06-10 PROCEDURE — 3017F COLORECTAL CA SCREEN DOC REV: CPT | Performed by: FAMILY MEDICINE

## 2024-06-10 PROCEDURE — 2022F DILAT RTA XM EVC RTNOPTHY: CPT | Performed by: FAMILY MEDICINE

## 2024-06-10 PROCEDURE — 3075F SYST BP GE 130 - 139MM HG: CPT | Performed by: FAMILY MEDICINE

## 2024-06-10 PROCEDURE — G8417 CALC BMI ABV UP PARAM F/U: HCPCS | Performed by: FAMILY MEDICINE

## 2024-06-10 PROCEDURE — G8427 DOCREV CUR MEDS BY ELIG CLIN: HCPCS | Performed by: FAMILY MEDICINE

## 2024-06-10 PROCEDURE — 1036F TOBACCO NON-USER: CPT | Performed by: FAMILY MEDICINE

## 2024-06-10 PROCEDURE — 3078F DIAST BP <80 MM HG: CPT | Performed by: FAMILY MEDICINE

## 2024-06-10 PROCEDURE — 3044F HG A1C LEVEL LT 7.0%: CPT | Performed by: FAMILY MEDICINE

## 2024-06-10 NOTE — PROGRESS NOTES
presentation not consistent with angina. We will continue to monitor for recurrence.    - EKG 12 Lead - Clinic Performed      Return in about 2 weeks (around 6/24/2024) for Pap smear .      Marlene Mccollum DO  Family Medicine

## 2024-06-28 ENCOUNTER — OFFICE VISIT (OUTPATIENT)
Dept: FAMILY MEDICINE CLINIC | Age: 54
End: 2024-06-28
Payer: COMMERCIAL

## 2024-06-28 VITALS
BODY MASS INDEX: 43.4 KG/M2 | HEART RATE: 79 BPM | WEIGHT: 293 LBS | DIASTOLIC BLOOD PRESSURE: 74 MMHG | OXYGEN SATURATION: 97 % | SYSTOLIC BLOOD PRESSURE: 120 MMHG | HEIGHT: 69 IN | TEMPERATURE: 97.4 F

## 2024-06-28 DIAGNOSIS — Z01.419 WELL FEMALE EXAM WITH ROUTINE GYNECOLOGICAL EXAM: Primary | ICD-10-CM

## 2024-06-28 DIAGNOSIS — Z12.31 ENCOUNTER FOR SCREENING MAMMOGRAM FOR MALIGNANT NEOPLASM OF BREAST: ICD-10-CM

## 2024-06-28 DIAGNOSIS — M25.562 BILATERAL CHRONIC KNEE PAIN: ICD-10-CM

## 2024-06-28 DIAGNOSIS — E03.9 ACQUIRED HYPOTHYROIDISM: ICD-10-CM

## 2024-06-28 DIAGNOSIS — E11.9 TYPE 2 DIABETES MELLITUS WITHOUT COMPLICATION, WITHOUT LONG-TERM CURRENT USE OF INSULIN (HCC): ICD-10-CM

## 2024-06-28 DIAGNOSIS — G89.29 BILATERAL CHRONIC KNEE PAIN: ICD-10-CM

## 2024-06-28 DIAGNOSIS — M25.561 BILATERAL CHRONIC KNEE PAIN: ICD-10-CM

## 2024-06-28 PROBLEM — M05.80 POLYARTHRITIS WITH POSITIVE RHEUMATOID FACTOR (HCC): Status: RESOLVED | Noted: 2021-10-11 | Resolved: 2024-06-28

## 2024-06-28 PROCEDURE — 3078F DIAST BP <80 MM HG: CPT | Performed by: FAMILY MEDICINE

## 2024-06-28 PROCEDURE — 3074F SYST BP LT 130 MM HG: CPT | Performed by: FAMILY MEDICINE

## 2024-06-28 PROCEDURE — 99396 PREV VISIT EST AGE 40-64: CPT | Performed by: FAMILY MEDICINE

## 2024-06-28 RX ORDER — TRAMADOL HYDROCHLORIDE 50 MG/1
50 TABLET ORAL EVERY 8 HOURS PRN
Qty: 90 TABLET | Refills: 0 | Status: SHIPPED | OUTPATIENT
Start: 2024-06-28 | End: 2024-07-28

## 2024-06-28 NOTE — PROGRESS NOTES
6/28/2024    Yaz Langford is a 54 y.o. female here for:    Chief Complaint   Patient presents with    Gynecologic Exam     Pelvic Exam with Pap        HPI:  Well-female exam   - Menarche: age 9   - Menopause: in her mid-late 40s   - Denies postmenopausal vaginal bleeding   - 1 STD as a teenager  - Monogamous with    - Admits to chronic urinary frequency and urinary urgency. Drinks a lot of water though. Denies dysuria and hematuria. Denies vaginal discharge. Denies genital lesions.   - Last Pap smear:  11/19/2020 in [place], normal  - No history of abnormal Pap smears   - Last mammogram: 06/23/2023, normal   - Last DEXA scan: NA   - Last colonoscopy: 09/14/2022, colon polyps, due in 5 years; denies melena but has bright red blood on toilet paper after straining to have BMs  - Last LDCT lung: NA   - No FamHx of breast, uterine, ovarian, or cervical cancer; FamHx of colon cancer in paternal and maternal uncles       Current Outpatient Medications   Medication Sig Dispense Refill    traMADol (ULTRAM) 50 MG tablet Take 1 tablet by mouth every 8 hours as needed for Pain for up to 30 days. Max Daily Amount: 150 mg 90 tablet 0    celecoxib (CELEBREX) 200 MG capsule Take 1 capsule by mouth 2 times daily 180 capsule 0    gabapentin (NEURONTIN) 100 MG capsule Take 2 capsules by mouth nightly for 90 days. (Patient taking differently: Take 2 capsules by mouth nightly. Take 100 mg in the AM and 200 mg in the evening) 180 capsule 0    levothyroxine (SYNTHROID) 75 MCG tablet TAKE ONE TABLET BY MOUTH DAILY 90 tablet 0    minocycline (MINOCIN;DYNACIN) 100 MG capsule TAKE ONE CAPSULE BY MOUTH EVERY DAY WITH FOOD      metroNIDAZOLE (METROCREAM) 0.75 % cream APPLY TO FACE TWICE DAILY      clindamycin (CLEOCIN T) 1 % lotion APPLY TO AFFECTED AREAS UNDERARMS  UNDER BREASTS  AND BELLY FOLDS TWICE A DAY      Coenzyme Q10 10 MG CAPS Take by mouth daily      Sennosides (SENOKOT PO) Take by mouth at bedtime      Potassium 99

## 2024-07-04 LAB
HPV SAMPLE: NORMAL
HPV SOURCE: NORMAL
HPV, GENOTYPE 16: NOT DETECTED
HPV, GENOTYPE 18: NOT DETECTED
HPV, HIGH RISK OTHER: NOT DETECTED
HPV, INTERPRETATION: NORMAL

## 2024-07-05 LAB
CHLAMYDIA BY THIN PREP: NEGATIVE
GYNECOLOGY CYTOLOGY REPORT: NORMAL
N. GONORRHOEAE DNA, THIN PREP: NEGATIVE

## 2024-07-17 ENCOUNTER — PATIENT MESSAGE (OUTPATIENT)
Dept: FAMILY MEDICINE CLINIC | Age: 54
End: 2024-07-17

## 2024-07-17 NOTE — TELEPHONE ENCOUNTER
From: Yaz Langford  To: Dr. Marlene Mccollum  Sent: 7/17/2024 11:15 AM EDT  Subject: CPAP    Dr. Mccollum:  I just received a call from [facility]. They are my CPAP supply provider. I was told that they need to receive a medical report wherein we discuss my use and benefits from the CPAP machine. Can we discuss that at the September appointment and get it documented? Thanks so much, enjoy the rest of your summer!!

## 2024-07-24 DIAGNOSIS — G89.29 BILATERAL CHRONIC KNEE PAIN: Primary | ICD-10-CM

## 2024-07-24 DIAGNOSIS — M25.561 BILATERAL CHRONIC KNEE PAIN: Primary | ICD-10-CM

## 2024-07-24 DIAGNOSIS — M25.562 BILATERAL CHRONIC KNEE PAIN: Primary | ICD-10-CM

## 2024-07-25 RX ORDER — TRAMADOL HYDROCHLORIDE 50 MG/1
50 TABLET ORAL EVERY 8 HOURS PRN
Qty: 90 TABLET | Refills: 0 | Status: SHIPPED | OUTPATIENT
Start: 2024-07-28 | End: 2024-08-27

## 2024-07-25 NOTE — TELEPHONE ENCOUNTER
Medication Refill Request    LOV 6/28/2024  NOV 9/13/2024    Lab Results   Component Value Date    CREATININE 0.8 06/08/2024

## 2024-07-29 DIAGNOSIS — E11.40 TYPE 2 DIABETES MELLITUS WITH DIABETIC NEUROPATHY, WITHOUT LONG-TERM CURRENT USE OF INSULIN (HCC): ICD-10-CM

## 2024-07-29 RX ORDER — GABAPENTIN 100 MG/1
200 CAPSULE ORAL NIGHTLY
Qty: 180 CAPSULE | Refills: 0 | Status: SHIPPED | OUTPATIENT
Start: 2024-07-29 | End: 2024-10-27

## 2024-08-14 DIAGNOSIS — E03.9 ACQUIRED HYPOTHYROIDISM: ICD-10-CM

## 2024-08-14 RX ORDER — LEVOTHYROXINE SODIUM 0.07 MG/1
75 TABLET ORAL DAILY
Qty: 90 TABLET | Refills: 1 | Status: SHIPPED | OUTPATIENT
Start: 2024-08-14

## 2024-08-14 NOTE — TELEPHONE ENCOUNTER
Medication Refill Request    LOV 6/28/2024  NOV 9/13/2024    Lab Results   Component Value Date    CREATININE 0.8 06/08/2024        Statement Selected

## 2024-08-24 DIAGNOSIS — M25.561 BILATERAL CHRONIC KNEE PAIN: ICD-10-CM

## 2024-08-24 DIAGNOSIS — G89.29 BILATERAL CHRONIC KNEE PAIN: ICD-10-CM

## 2024-08-24 DIAGNOSIS — M25.562 BILATERAL CHRONIC KNEE PAIN: ICD-10-CM

## 2024-08-26 ENCOUNTER — TELEPHONE (OUTPATIENT)
Dept: FAMILY MEDICINE CLINIC | Age: 54
End: 2024-08-26

## 2024-08-26 DIAGNOSIS — M25.561 BILATERAL CHRONIC KNEE PAIN: ICD-10-CM

## 2024-08-26 DIAGNOSIS — M25.562 BILATERAL CHRONIC KNEE PAIN: ICD-10-CM

## 2024-08-26 DIAGNOSIS — G89.29 BILATERAL CHRONIC KNEE PAIN: ICD-10-CM

## 2024-08-26 RX ORDER — TRAMADOL HYDROCHLORIDE 50 MG/1
50 TABLET ORAL EVERY 8 HOURS PRN
Qty: 90 TABLET | Refills: 0 | Status: SHIPPED | OUTPATIENT
Start: 2024-08-26 | End: 2024-09-25

## 2024-08-26 RX ORDER — CELECOXIB 200 MG/1
200 CAPSULE ORAL 2 TIMES DAILY
Qty: 180 CAPSULE | Refills: 1 | Status: SHIPPED | OUTPATIENT
Start: 2024-08-26

## 2024-09-07 ENCOUNTER — HOSPITAL ENCOUNTER (OUTPATIENT)
Age: 54
Discharge: HOME | End: 2024-09-07
Payer: COMMERCIAL

## 2024-09-07 DIAGNOSIS — E03.9 ACQUIRED HYPOTHYROIDISM: ICD-10-CM

## 2024-09-07 DIAGNOSIS — E11.9 TYPE 2 DIABETES MELLITUS WITHOUT COMPLICATION, WITHOUT LONG-TERM CURRENT USE OF INSULIN (HCC): ICD-10-CM

## 2024-09-07 LAB
ALBUMIN SERPL-MCNC: 4 G/DL (ref 3.5–5.2)
ALP SERPL-CCNC: 59 U/L (ref 35–104)
ALT SERPL-CCNC: 15 U/L (ref 0–32)
ANION GAP SERPL CALCULATED.3IONS-SCNC: 12 MMOL/L (ref 7–16)
AST SERPL-CCNC: 13 U/L (ref 0–31)
BASOPHILS # BLD: 0.02 K/UL (ref 0–0.2)
BASOPHILS NFR BLD: 0 % (ref 0–2)
BILIRUB SERPL-MCNC: 0.7 MG/DL (ref 0–1.2)
BUN SERPL-MCNC: 11 MG/DL (ref 6–20)
CALCIUM SERPL-MCNC: 9.5 MG/DL (ref 8.6–10.2)
CHLORIDE SERPL-SCNC: 107 MMOL/L (ref 98–107)
CHOLEST SERPL-MCNC: 142 MG/DL
CO2 SERPL-SCNC: 28 MMOL/L (ref 22–29)
CREAT SERPL-MCNC: 0.7 MG/DL (ref 0.5–1)
EOSINOPHIL # BLD: 0.09 K/UL (ref 0.05–0.5)
EOSINOPHILS RELATIVE PERCENT: 2 % (ref 0–6)
ERYTHROCYTE [DISTWIDTH] IN BLOOD BY AUTOMATED COUNT: 13.3 % (ref 11.5–15)
GFR, ESTIMATED: >90 ML/MIN/1.73M2
GLUCOSE SERPL-MCNC: 137 MG/DL (ref 74–99)
HBA1C MFR BLD: 6.5 % (ref 4–5.6)
HCT VFR BLD AUTO: 39 % (ref 34–48)
HDLC SERPL-MCNC: 52 MG/DL
HGB BLD-MCNC: 12.6 G/DL (ref 11.5–15.5)
IMM GRANULOCYTES # BLD AUTO: <0.03 K/UL (ref 0–0.58)
IMM GRANULOCYTES NFR BLD: 0 % (ref 0–5)
LDLC SERPL CALC-MCNC: 76 MG/DL
LYMPHOCYTES NFR BLD: 1.25 K/UL (ref 1.5–4)
LYMPHOCYTES RELATIVE PERCENT: 21 % (ref 20–42)
MCH RBC QN AUTO: 28.3 PG (ref 26–35)
MCHC RBC AUTO-ENTMCNC: 32.3 G/DL (ref 32–34.5)
MCV RBC AUTO: 87.6 FL (ref 80–99.9)
MONOCYTES NFR BLD: 0.39 K/UL (ref 0.1–0.95)
MONOCYTES NFR BLD: 6 % (ref 2–12)
NEUTROPHILS NFR BLD: 71 % (ref 43–80)
NEUTS SEG NFR BLD: 4.33 K/UL (ref 1.8–7.3)
PLATELET # BLD AUTO: 221 K/UL (ref 130–450)
PMV BLD AUTO: 10.3 FL (ref 7–12)
POTASSIUM SERPL-SCNC: 4.3 MMOL/L (ref 3.5–5)
PROT SERPL-MCNC: 6.9 G/DL (ref 6.4–8.3)
RBC # BLD AUTO: 4.45 M/UL (ref 3.5–5.5)
SODIUM SERPL-SCNC: 147 MMOL/L (ref 132–146)
TRIGL SERPL-MCNC: 71 MG/DL
TSH SERPL DL<=0.05 MIU/L-ACNC: 1.82 UIU/ML (ref 0.27–4.2)
VLDLC SERPL CALC-MCNC: 14 MG/DL
WBC OTHER # BLD: 6.1 K/UL (ref 4.5–11.5)

## 2024-09-07 PROCEDURE — 80061 LIPID PANEL: CPT

## 2024-09-07 PROCEDURE — 80053 COMPREHEN METABOLIC PANEL: CPT

## 2024-09-07 PROCEDURE — 36415 COLL VENOUS BLD VENIPUNCTURE: CPT

## 2024-09-07 PROCEDURE — 83036 HEMOGLOBIN GLYCOSYLATED A1C: CPT

## 2024-09-07 PROCEDURE — 84443 ASSAY THYROID STIM HORMONE: CPT

## 2024-09-07 PROCEDURE — 85025 COMPLETE CBC W/AUTO DIFF WBC: CPT

## 2024-09-13 ENCOUNTER — OFFICE VISIT (OUTPATIENT)
Dept: FAMILY MEDICINE CLINIC | Age: 54
End: 2024-09-13

## 2024-09-13 VITALS
WEIGHT: 293 LBS | BODY MASS INDEX: 53.61 KG/M2 | TEMPERATURE: 97.3 F | OXYGEN SATURATION: 98 % | SYSTOLIC BLOOD PRESSURE: 130 MMHG | DIASTOLIC BLOOD PRESSURE: 80 MMHG | HEART RATE: 79 BPM

## 2024-09-13 DIAGNOSIS — E11.42 TYPE 2 DIABETES MELLITUS WITH DIABETIC POLYNEUROPATHY, WITHOUT LONG-TERM CURRENT USE OF INSULIN (HCC): Primary | ICD-10-CM

## 2024-09-13 DIAGNOSIS — L73.2 HIDRADENITIS SUPPURATIVA: ICD-10-CM

## 2024-09-13 DIAGNOSIS — E87.0 HYPERNATREMIA: ICD-10-CM

## 2024-09-13 DIAGNOSIS — E11.42 DIABETIC POLYNEUROPATHY ASSOCIATED WITH TYPE 2 DIABETES MELLITUS (HCC): ICD-10-CM

## 2024-09-13 DIAGNOSIS — Z23 NEED FOR INFLUENZA VACCINATION: ICD-10-CM

## 2024-09-13 DIAGNOSIS — E03.9 ACQUIRED HYPOTHYROIDISM: ICD-10-CM

## 2024-09-13 DIAGNOSIS — M17.0 PRIMARY OSTEOARTHRITIS OF BOTH KNEES: ICD-10-CM

## 2024-09-13 PROBLEM — I10 ESSENTIAL HYPERTENSION: Status: RESOLVED | Noted: 2022-04-15 | Resolved: 2024-09-13

## 2024-09-13 PROBLEM — M19.90 OSTEOARTHRITIS: Status: RESOLVED | Noted: 2022-04-15 | Resolved: 2024-09-13

## 2024-09-13 LAB
ANION GAP SERPL CALCULATED.3IONS-SCNC: 10 MMOL/L (ref 7–16)
BUN BLDV-MCNC: 10 MG/DL (ref 6–20)
CALCIUM SERPL-MCNC: 9.6 MG/DL (ref 8.6–10.2)
CHLORIDE BLD-SCNC: 98 MMOL/L (ref 98–107)
CO2: 30 MMOL/L (ref 22–29)
CREAT SERPL-MCNC: 0.7 MG/DL (ref 0.5–1)
GFR, ESTIMATED: >90 ML/MIN/1.73M2
GLUCOSE BLD-MCNC: 136 MG/DL (ref 74–99)
POTASSIUM SERPL-SCNC: 4.1 MMOL/L (ref 3.5–5)
SODIUM BLD-SCNC: 138 MMOL/L (ref 132–146)

## 2024-09-13 RX ORDER — MINOCYCLINE HYDROCHLORIDE 100 MG/1
100 CAPSULE ORAL 2 TIMES DAILY
Qty: 180 CAPSULE | Refills: 0 | Status: SHIPPED | OUTPATIENT
Start: 2024-09-13 | End: 2024-12-12

## 2024-09-13 RX ORDER — CLINDAMYCIN PHOSPHATE 10 UG/ML
LOTION TOPICAL 2 TIMES DAILY
Qty: 60 G | Refills: 2 | Status: SHIPPED | OUTPATIENT
Start: 2024-09-13

## 2024-09-22 DIAGNOSIS — M25.562 BILATERAL CHRONIC KNEE PAIN: ICD-10-CM

## 2024-09-22 DIAGNOSIS — G89.29 BILATERAL CHRONIC KNEE PAIN: ICD-10-CM

## 2024-09-22 DIAGNOSIS — M25.561 BILATERAL CHRONIC KNEE PAIN: ICD-10-CM

## 2024-09-23 RX ORDER — TRAMADOL HYDROCHLORIDE 50 MG/1
50 TABLET ORAL EVERY 8 HOURS PRN
Qty: 90 TABLET | Refills: 0 | Status: SHIPPED | OUTPATIENT
Start: 2024-09-26 | End: 2024-10-26

## 2024-10-23 DIAGNOSIS — M25.562 BILATERAL CHRONIC KNEE PAIN: ICD-10-CM

## 2024-10-23 DIAGNOSIS — G89.29 BILATERAL CHRONIC KNEE PAIN: ICD-10-CM

## 2024-10-23 DIAGNOSIS — M25.561 BILATERAL CHRONIC KNEE PAIN: ICD-10-CM

## 2024-10-23 RX ORDER — TRAMADOL HYDROCHLORIDE 50 MG/1
50 TABLET ORAL EVERY 8 HOURS PRN
Qty: 90 TABLET | Refills: 0 | Status: SHIPPED | OUTPATIENT
Start: 2024-10-26 | End: 2024-11-25

## 2024-10-23 NOTE — TELEPHONE ENCOUNTER
Name of Medication(s) Requested:  Requested Prescriptions     Pending Prescriptions Disp Refills    traMADol (ULTRAM) 50 MG tablet [Pharmacy Med Name: traMADol HCl Oral Tablet 50 MG] 90 tablet 0     Sig: Take 1 tablet by mouth every 8 hours as needed for Pain for up to 30 days. Max Daily Amount: 150 mg       Medication is on current medication list Yes    Dosage and directions were verified? Yes    Quantity verified: 30 day supply     Pharmacy Verified?  Yes    Last Appointment:  9/13/2024    Future appts:  Future Appointments   Date Time Provider Department Center   12/13/2024 10:00 AM Marlene Mccollum DO [place] [facility] ECC DEP        (If no appt send self scheduling link. .REFILLAPPT)  Scheduling request sent?     [] Yes  [x] No    Does patient need updated?  [] Yes  [x] No

## 2024-10-27 DIAGNOSIS — E11.40 TYPE 2 DIABETES MELLITUS WITH DIABETIC NEUROPATHY, WITHOUT LONG-TERM CURRENT USE OF INSULIN (HCC): ICD-10-CM

## 2024-10-28 RX ORDER — GABAPENTIN 400 MG/1
400 CAPSULE ORAL NIGHTLY
Qty: 90 CAPSULE | Refills: 0 | Status: SHIPPED | OUTPATIENT
Start: 2024-10-28 | End: 2025-01-26

## 2024-10-28 NOTE — TELEPHONE ENCOUNTER
Name of Medication(s) Requested:  Requested Prescriptions     Pending Prescriptions Disp Refills    gabapentin (NEURONTIN) 100 MG capsule [Pharmacy Med Name: Gabapentin Oral Capsule 100 MG] 180 capsule 0     Sig: Take 2 capsules by mouth nightly for 90 days.       Medication is on current medication list Yes    Dosage and directions were verified? Yes    Quantity verified: 90 day supply     Pharmacy Verified?  Yes    Last Appointment:  9/13/2024    Future appts:  Future Appointments   Date Time Provider Department Center   12/13/2024 10:00 AM Marlene Mccollum DO [place] [facility] ECC DEP        (If no appt send self scheduling link. .REFILLAPPT)  Scheduling request sent?     [] Yes  [x] No    Does patient need updated?  [] Yes  [x] No

## 2024-11-10 DIAGNOSIS — E11.40 TYPE 2 DIABETES MELLITUS WITH DIABETIC NEUROPATHY, WITHOUT LONG-TERM CURRENT USE OF INSULIN (HCC): ICD-10-CM

## 2024-11-11 RX ORDER — GABAPENTIN 100 MG/1
CAPSULE ORAL
Qty: 180 CAPSULE | Refills: 0 | OUTPATIENT
Start: 2024-11-11

## 2024-11-24 DIAGNOSIS — M25.562 BILATERAL CHRONIC KNEE PAIN: ICD-10-CM

## 2024-11-24 DIAGNOSIS — G89.29 BILATERAL CHRONIC KNEE PAIN: ICD-10-CM

## 2024-11-24 DIAGNOSIS — M25.561 BILATERAL CHRONIC KNEE PAIN: ICD-10-CM

## 2024-11-25 RX ORDER — TRAMADOL HYDROCHLORIDE 50 MG/1
50 TABLET ORAL EVERY 8 HOURS PRN
Qty: 90 TABLET | Refills: 0 | Status: SHIPPED | OUTPATIENT
Start: 2024-11-26 | End: 2024-12-26

## 2024-11-25 NOTE — TELEPHONE ENCOUNTER
Name of Medication(s) Requested:  Requested Prescriptions     Pending Prescriptions Disp Refills    traMADol (ULTRAM) 50 MG tablet [Pharmacy Med Name: traMADol HCl Oral Tablet 50 MG] 90 tablet 0     Sig: TAKE ONE TABLET BY MOUTH EVERY 8 HOURS AS NEEDED FOR PAIN FOR UP TO 30 DAYS. MAX DAILY AMOUNT: 150 MG. REDUCE DOSES TAKEN AS PAIN BECOMES MANAGEABLE       Medication is on current medication list Yes    Dosage and directions were verified? Yes    Quantity verified: 30 day supply     Pharmacy Verified?  Yes    Last Appointment:  9/13/2024    Future appts:  Future Appointments   Date Time Provider Department Center   12/13/2024 10:00 AM Marlene Mccollum DO [place] [facility] ECC DEP        (If no appt send self scheduling link. .REFILLAPPT)  Scheduling request sent?     [] Yes  [x] No    Does patient need updated?  [] Yes  [x] No

## 2024-12-02 ENCOUNTER — HOSPITAL ENCOUNTER (EMERGENCY)
Age: 54
Discharge: HOME | End: 2024-12-02
Payer: COMMERCIAL

## 2024-12-02 ENCOUNTER — APPOINTMENT (OUTPATIENT)
Dept: CT IMAGING | Age: 54
End: 2024-12-02
Payer: COMMERCIAL

## 2024-12-02 VITALS
WEIGHT: 293 LBS | HEART RATE: 88 BPM | HEIGHT: 70 IN | RESPIRATION RATE: 20 BRPM | BODY MASS INDEX: 41.95 KG/M2 | TEMPERATURE: 98.4 F | SYSTOLIC BLOOD PRESSURE: 155 MMHG | OXYGEN SATURATION: 98 % | DIASTOLIC BLOOD PRESSURE: 84 MMHG

## 2024-12-02 DIAGNOSIS — N20.0 KIDNEY STONE: Primary | ICD-10-CM

## 2024-12-02 LAB
ALBUMIN SERPL-MCNC: 4.1 G/DL (ref 3.5–5.2)
ALP SERPL-CCNC: 71 U/L (ref 35–104)
ALT SERPL-CCNC: 15 U/L (ref 0–32)
ANION GAP SERPL CALCULATED.3IONS-SCNC: 7 MMOL/L (ref 7–16)
AST SERPL-CCNC: 14 U/L (ref 0–31)
BASOPHILS # BLD: 0.04 K/UL (ref 0–0.2)
BASOPHILS NFR BLD: 0 % (ref 0–2)
BILIRUB SERPL-MCNC: 0.6 MG/DL (ref 0–1.2)
BILIRUB UR QL STRIP: NEGATIVE
BUN SERPL-MCNC: 11 MG/DL (ref 6–20)
CALCIUM SERPL-MCNC: 9.6 MG/DL (ref 8.6–10.2)
CHLORIDE SERPL-SCNC: 102 MMOL/L (ref 98–107)
CLARITY UR: CLEAR
CO2 SERPL-SCNC: 32 MMOL/L (ref 22–29)
COLOR UR: YELLOW
CREAT SERPL-MCNC: 0.7 MG/DL (ref 0.5–1)
EOSINOPHIL # BLD: 0.12 K/UL (ref 0.05–0.5)
EOSINOPHILS RELATIVE PERCENT: 1 % (ref 0–6)
ERYTHROCYTE [DISTWIDTH] IN BLOOD BY AUTOMATED COUNT: 13.2 % (ref 11.5–15)
GFR, ESTIMATED: >90 ML/MIN/1.73M2
GLUCOSE SERPL-MCNC: 192 MG/DL (ref 74–99)
GLUCOSE UR STRIP-MCNC: NEGATIVE MG/DL
HCT VFR BLD AUTO: 40.3 % (ref 34–48)
HGB BLD-MCNC: 12.8 G/DL (ref 11.5–15.5)
HGB UR QL STRIP.AUTO: NEGATIVE
IMM GRANULOCYTES # BLD AUTO: 0.04 K/UL (ref 0–0.58)
IMM GRANULOCYTES NFR BLD: 0 % (ref 0–5)
KETONES UR STRIP-MCNC: NEGATIVE MG/DL
LACTATE BLDV-SCNC: 1.8 MMOL/L (ref 0.5–2.2)
LEUKOCYTE ESTERASE UR QL STRIP: NEGATIVE
LIPASE SERPL-CCNC: 21 U/L (ref 13–60)
LYMPHOCYTES NFR BLD: 2.15 K/UL (ref 1.5–4)
LYMPHOCYTES RELATIVE PERCENT: 22 % (ref 20–42)
MAGNESIUM SERPL-MCNC: 1.8 MG/DL (ref 1.6–2.6)
MCH RBC QN AUTO: 28.5 PG (ref 26–35)
MCHC RBC AUTO-ENTMCNC: 31.8 G/DL (ref 32–34.5)
MCV RBC AUTO: 89.8 FL (ref 80–99.9)
MONOCYTES NFR BLD: 0.57 K/UL (ref 0.1–0.95)
MONOCYTES NFR BLD: 6 % (ref 2–12)
NEUTROPHILS NFR BLD: 70 % (ref 43–80)
NEUTS SEG NFR BLD: 6.73 K/UL (ref 1.8–7.3)
NITRITE UR QL STRIP: NEGATIVE
PH UR STRIP: 6 [PH] (ref 5–9)
PLATELET # BLD AUTO: 213 K/UL (ref 130–450)
PMV BLD AUTO: 11 FL (ref 7–12)
POTASSIUM SERPL-SCNC: 4.2 MMOL/L (ref 3.5–5)
PROT SERPL-MCNC: 7.1 G/DL (ref 6.4–8.3)
PROT UR STRIP-MCNC: NEGATIVE MG/DL
RBC # BLD AUTO: 4.49 M/UL (ref 3.5–5.5)
RBC #/AREA URNS HPF: ABNORMAL /HPF
SODIUM SERPL-SCNC: 141 MMOL/L (ref 132–146)
SP GR UR STRIP: >1.03 (ref 1–1.03)
UROBILINOGEN UR STRIP-ACNC: 0.2 EU/DL (ref 0–1)
WBC #/AREA URNS HPF: ABNORMAL /HPF
WBC OTHER # BLD: 9.7 K/UL (ref 4.5–11.5)

## 2024-12-02 PROCEDURE — 74176 CT ABD & PELVIS W/O CONTRAST: CPT

## 2024-12-02 PROCEDURE — 80053 COMPREHEN METABOLIC PANEL: CPT

## 2024-12-02 PROCEDURE — 81001 URINALYSIS AUTO W/SCOPE: CPT

## 2024-12-02 PROCEDURE — 96374 THER/PROPH/DIAG INJ IV PUSH: CPT

## 2024-12-02 PROCEDURE — 96375 TX/PRO/DX INJ NEW DRUG ADDON: CPT

## 2024-12-02 PROCEDURE — 96376 TX/PRO/DX INJ SAME DRUG ADON: CPT

## 2024-12-02 PROCEDURE — 83735 ASSAY OF MAGNESIUM: CPT

## 2024-12-02 PROCEDURE — 85025 COMPLETE CBC W/AUTO DIFF WBC: CPT

## 2024-12-02 PROCEDURE — 83690 ASSAY OF LIPASE: CPT

## 2024-12-02 PROCEDURE — 6360000002 HC RX W HCPCS: Performed by: NURSE PRACTITIONER

## 2024-12-02 PROCEDURE — 99284 EMERGENCY DEPT VISIT MOD MDM: CPT

## 2024-12-02 PROCEDURE — 83605 ASSAY OF LACTIC ACID: CPT

## 2024-12-02 RX ORDER — TAMSULOSIN HYDROCHLORIDE 0.4 MG/1
0.4 CAPSULE ORAL DAILY
Qty: 30 CAPSULE | Refills: 0 | Status: SHIPPED | OUTPATIENT
Start: 2024-12-02

## 2024-12-02 RX ORDER — ONDANSETRON 2 MG/ML
4 INJECTION INTRAMUSCULAR; INTRAVENOUS ONCE
Status: COMPLETED | OUTPATIENT
Start: 2024-12-02 | End: 2024-12-02

## 2024-12-02 RX ORDER — ONDANSETRON 4 MG/1
4 TABLET, ORALLY DISINTEGRATING ORAL 3 TIMES DAILY PRN
Qty: 21 TABLET | Refills: 0 | Status: SHIPPED | OUTPATIENT
Start: 2024-12-02

## 2024-12-02 RX ORDER — OXYCODONE AND ACETAMINOPHEN 5; 325 MG/1; MG/1
1 TABLET ORAL EVERY 6 HOURS PRN
Qty: 12 TABLET | Refills: 0 | Status: SHIPPED | OUTPATIENT
Start: 2024-12-02 | End: 2024-12-05

## 2024-12-02 RX ORDER — KETOROLAC TROMETHAMINE 30 MG/ML
30 INJECTION, SOLUTION INTRAMUSCULAR; INTRAVENOUS ONCE
Status: COMPLETED | OUTPATIENT
Start: 2024-12-02 | End: 2024-12-02

## 2024-12-02 RX ADMIN — HYDROMORPHONE HYDROCHLORIDE 0.5 MG: 1 INJECTION, SOLUTION INTRAMUSCULAR; INTRAVENOUS; SUBCUTANEOUS at 12:28

## 2024-12-02 RX ADMIN — KETOROLAC TROMETHAMINE 30 MG: 30 INJECTION, SOLUTION INTRAMUSCULAR at 11:31

## 2024-12-02 RX ADMIN — HYDROMORPHONE HYDROCHLORIDE 0.5 MG: 1 INJECTION, SOLUTION INTRAMUSCULAR; INTRAVENOUS; SUBCUTANEOUS at 13:46

## 2024-12-02 RX ADMIN — ONDANSETRON 4 MG: 2 INJECTION INTRAMUSCULAR; INTRAVENOUS at 11:30

## 2024-12-02 ASSESSMENT — PAIN SCALES - GENERAL
PAINLEVEL_OUTOF10: 9
PAINLEVEL_OUTOF10: 10
PAINLEVEL_OUTOF10: 8

## 2024-12-02 ASSESSMENT — PAIN DESCRIPTION - LOCATION: LOCATION: FLANK

## 2024-12-02 ASSESSMENT — PAIN DESCRIPTION - DESCRIPTORS: DESCRIPTORS: ACHING;STABBING;SHARP

## 2024-12-02 ASSESSMENT — LIFESTYLE VARIABLES: HOW OFTEN DO YOU HAVE A DRINK CONTAINING ALCOHOL: NEVER

## 2024-12-02 ASSESSMENT — PAIN DESCRIPTION - ORIENTATION: ORIENTATION: LEFT

## 2024-12-02 NOTE — ED PROVIDER NOTES
arrival.  Patient states sudden onset of pain in the left flank region that now radiates to the left lower quadrant of the abdomen.  Patient states history of kidney stones.  Patient denies dysuria or flank blood in urine.  Patient denies fever or chills.  Patient complains of nausea.  Patient states she did take 1 Percocet this morning that she had leftover from the last time that she had a kidney stone.  Patient states no relief.    Electrolytes within normal limits other than a slightly elevated CO2 level of 32 and an elevated glucose at 192.  Patient without leukocytosis or anemia on the CBC.  CT of the abdomen and pelvis shows a 2 mm distal left ureteral calculus with very mild left ureteral dilation.  2 mm nonobstructing left renal calculus.    Impression is being 2 mm distal left ureteral calculi with mild hydronephrosis.  Plan is to discharge patient to home and have her follow-up with urology this week.  Patient will be prescribed Flomax, Zofran, and Percocet for pain.  Patient is to take Motrin or Tylenol as needed for milder pain.  Patient is to encourage fluids.  Patient was provided with a strainer.  Patient has been advised of red flags of when to to return to the emergency department for reevaluation including fever or chills or inability to urinate.    I have spoken with the patient/caregiver and discussed today’s results, in addition to providing specific details for the plan of care and counseling regarding the diagnosis and prognosis and are agreeable with the plan. All results reviewed with pt and all questions answered.  I discussed the differential, results and discharge plan with the patient and/or family/friend/caregiver if present.  I emphasized the importance of follow-up with the physician I referred them to in the timeframe recommended.  I explained reasons for the patient to return to the Emergency Department. Additional verbal discharge instructions were also given and discussed with

## 2024-12-02 NOTE — ED NOTES
Received report from [employer] that urine sample sent was not secured and leaked. Needs another sample

## 2024-12-02 NOTE — ED NOTES
53 y/o female to the ed with a c/c of left lower quadrant abdominal pain and left flank pain that developed this morning just prior to arrival. Patient states sudden onset of pain in the left flank region that now radiates to the left lower quadrant of the abdomen. Patient states history of kidney stones.  Patient denies dysuria or flank blood in urine. Upon arrival, patient ambulatory without assistance, noted PW&D and presenting in NAD. Patient denies chest pain, sob or difficulty breathing. Patient + for nausea, - for fever or chills. Patient noted with + pms x 4, pupils PERRLA @ 3 and lung sounds that are clear and equil bilaterally. 12-lead EKG performed. Vitals noted as recorded. PIV placed with labs drawn and sent per FOSTER. Call light placed within patient's reach, and bed in lowest position with side rails up x 2 for safety. Currently awaiting transport for imaging.

## 2024-12-02 NOTE — ED NOTES
Patient resting in position of comfort on bed presenting in no acute/ apparent distress. Respirations are noted even and unlabored with good rise and fall of the chest observed. Patient updated with current plan of care (POC) and all questions/ concerns addressed. Patient voices no needs at this time. Bed noted in lowest position, locked, with side rails X 2 up for patient safety. Will continue to monitor patient for acute changes.      [x] Side rails up    [x] Cart in lowest position    [x] Family at bedside    [x] Call light within reach

## 2024-12-03 ENCOUNTER — TELEPHONE (OUTPATIENT)
Dept: FAMILY MEDICINE CLINIC | Age: 54
End: 2024-12-03

## 2024-12-03 DIAGNOSIS — N20.0 KIDNEY STONES: Primary | ICD-10-CM

## 2024-12-03 NOTE — TELEPHONE ENCOUNTER
Pt called today, was @  [facility] ER for kidney stone yesterday, which pt is waiting to pass. ER gave her Percocet, which she can take at night, but is too strong for day.   Pt is asking for scrip for Toradol, small qty (6+); said she had 2 of these left from when she had stones May 2024, and found if she takes one Toradol with 1 Tramadol, she is better during the day.     CATHY Mccoy Dr

## 2024-12-04 RX ORDER — KETOROLAC TROMETHAMINE 10 MG/1
10 TABLET, FILM COATED ORAL EVERY 6 HOURS PRN
Qty: 20 TABLET | Refills: 0 | Status: SHIPPED | OUTPATIENT
Start: 2024-12-04

## 2024-12-04 NOTE — TELEPHONE ENCOUNTER
Please call patient and let her know that I prescribed her oral Toradol 10 mg every 6 hours PRN. It looks like she received a 30 mg injection of Toradol in ED on 12/02/2024.     Please let patient know that she CANNOT use both Celebrex and Toradol together.

## 2024-12-07 ENCOUNTER — HOSPITAL ENCOUNTER (OUTPATIENT)
Age: 54
Discharge: HOME | End: 2024-12-07
Payer: COMMERCIAL

## 2024-12-07 DIAGNOSIS — E03.9 ACQUIRED HYPOTHYROIDISM: ICD-10-CM

## 2024-12-07 DIAGNOSIS — E11.42 TYPE 2 DIABETES MELLITUS WITH DIABETIC POLYNEUROPATHY, WITHOUT LONG-TERM CURRENT USE OF INSULIN (HCC): ICD-10-CM

## 2024-12-07 LAB
ALBUMIN SERPL-MCNC: 3.6 G/DL (ref 3.5–5.2)
ALP SERPL-CCNC: 66 U/L (ref 35–104)
ALT SERPL-CCNC: 11 U/L (ref 0–32)
ANION GAP SERPL CALCULATED.3IONS-SCNC: 9 MMOL/L (ref 7–16)
AST SERPL-CCNC: 14 U/L (ref 0–31)
BASOPHILS # BLD: 0.03 K/UL (ref 0–0.2)
BASOPHILS NFR BLD: 0 % (ref 0–2)
BILIRUB SERPL-MCNC: 0.8 MG/DL (ref 0–1.2)
BUN SERPL-MCNC: 12 MG/DL (ref 6–20)
CALCIUM SERPL-MCNC: 9.1 MG/DL (ref 8.6–10.2)
CHLORIDE SERPL-SCNC: 100 MMOL/L (ref 98–107)
CHOLEST SERPL-MCNC: 136 MG/DL
CO2 SERPL-SCNC: 29 MMOL/L (ref 22–29)
CREAT SERPL-MCNC: 1 MG/DL (ref 0.5–1)
EOSINOPHIL # BLD: 0.12 K/UL (ref 0.05–0.5)
EOSINOPHILS RELATIVE PERCENT: 2 % (ref 0–6)
ERYTHROCYTE [DISTWIDTH] IN BLOOD BY AUTOMATED COUNT: 13.2 % (ref 11.5–15)
GFR, ESTIMATED: 67 ML/MIN/1.73M2
GLUCOSE SERPL-MCNC: 150 MG/DL (ref 74–99)
HBA1C MFR BLD: 6.3 % (ref 4–5.6)
HCT VFR BLD AUTO: 35.8 % (ref 34–48)
HDLC SERPL-MCNC: 61 MG/DL
HGB BLD-MCNC: 11.3 G/DL (ref 11.5–15.5)
IMM GRANULOCYTES # BLD AUTO: <0.03 K/UL (ref 0–0.58)
IMM GRANULOCYTES NFR BLD: 0 % (ref 0–5)
LDLC SERPL CALC-MCNC: 59 MG/DL
LYMPHOCYTES NFR BLD: 1.25 K/UL (ref 1.5–4)
LYMPHOCYTES RELATIVE PERCENT: 16 % (ref 20–42)
MCH RBC QN AUTO: 29 PG (ref 26–35)
MCHC RBC AUTO-ENTMCNC: 31.6 G/DL (ref 32–34.5)
MCV RBC AUTO: 91.8 FL (ref 80–99.9)
MONOCYTES NFR BLD: 0.5 K/UL (ref 0.1–0.95)
MONOCYTES NFR BLD: 7 % (ref 2–12)
NEUTROPHILS NFR BLD: 75 % (ref 43–80)
NEUTS SEG NFR BLD: 5.75 K/UL (ref 1.8–7.3)
PLATELET # BLD AUTO: 188 K/UL (ref 130–450)
PMV BLD AUTO: 10.9 FL (ref 7–12)
POTASSIUM SERPL-SCNC: 4.5 MMOL/L (ref 3.5–5)
PROT SERPL-MCNC: 6.6 G/DL (ref 6.4–8.3)
RBC # BLD AUTO: 3.9 M/UL (ref 3.5–5.5)
SODIUM SERPL-SCNC: 138 MMOL/L (ref 132–146)
TRIGL SERPL-MCNC: 79 MG/DL
TSH SERPL DL<=0.05 MIU/L-ACNC: 3.04 UIU/ML (ref 0.27–4.2)
VLDLC SERPL CALC-MCNC: 16 MG/DL
WBC OTHER # BLD: 7.7 K/UL (ref 4.5–11.5)

## 2024-12-07 PROCEDURE — 84443 ASSAY THYROID STIM HORMONE: CPT

## 2024-12-07 PROCEDURE — 80053 COMPREHEN METABOLIC PANEL: CPT

## 2024-12-07 PROCEDURE — 85025 COMPLETE CBC W/AUTO DIFF WBC: CPT

## 2024-12-07 PROCEDURE — 80061 LIPID PANEL: CPT

## 2024-12-07 PROCEDURE — 36415 COLL VENOUS BLD VENIPUNCTURE: CPT

## 2024-12-07 PROCEDURE — 83036 HEMOGLOBIN GLYCOSYLATED A1C: CPT

## 2024-12-13 ENCOUNTER — OFFICE VISIT (OUTPATIENT)
Dept: FAMILY MEDICINE CLINIC | Age: 54
End: 2024-12-13
Payer: COMMERCIAL

## 2024-12-13 VITALS
WEIGHT: 293 LBS | BODY MASS INDEX: 52.31 KG/M2 | DIASTOLIC BLOOD PRESSURE: 80 MMHG | OXYGEN SATURATION: 100 % | TEMPERATURE: 97 F | HEART RATE: 65 BPM | SYSTOLIC BLOOD PRESSURE: 104 MMHG

## 2024-12-13 DIAGNOSIS — G47.33 OSA ON CPAP: ICD-10-CM

## 2024-12-13 DIAGNOSIS — D64.9 ANEMIA, UNSPECIFIED TYPE: ICD-10-CM

## 2024-12-13 DIAGNOSIS — E11.42 DIABETIC POLYNEUROPATHY ASSOCIATED WITH TYPE 2 DIABETES MELLITUS (HCC): ICD-10-CM

## 2024-12-13 DIAGNOSIS — L73.2 HIDRADENITIS SUPPURATIVA: ICD-10-CM

## 2024-12-13 DIAGNOSIS — G89.29 CHRONIC PAIN OF BOTH KNEES: ICD-10-CM

## 2024-12-13 DIAGNOSIS — E11.42 TYPE 2 DIABETES MELLITUS WITH DIABETIC POLYNEUROPATHY, WITHOUT LONG-TERM CURRENT USE OF INSULIN (HCC): Primary | ICD-10-CM

## 2024-12-13 DIAGNOSIS — M25.561 CHRONIC PAIN OF BOTH KNEES: ICD-10-CM

## 2024-12-13 DIAGNOSIS — Z12.31 ENCOUNTER FOR SCREENING MAMMOGRAM FOR MALIGNANT NEOPLASM OF BREAST: ICD-10-CM

## 2024-12-13 DIAGNOSIS — M25.562 CHRONIC PAIN OF BOTH KNEES: ICD-10-CM

## 2024-12-13 DIAGNOSIS — E03.9 ACQUIRED HYPOTHYROIDISM: ICD-10-CM

## 2024-12-13 PROCEDURE — 3017F COLORECTAL CA SCREEN DOC REV: CPT | Performed by: FAMILY MEDICINE

## 2024-12-13 PROCEDURE — 2022F DILAT RTA XM EVC RTNOPTHY: CPT | Performed by: FAMILY MEDICINE

## 2024-12-13 PROCEDURE — G8484 FLU IMMUNIZE NO ADMIN: HCPCS | Performed by: FAMILY MEDICINE

## 2024-12-13 PROCEDURE — 1036F TOBACCO NON-USER: CPT | Performed by: FAMILY MEDICINE

## 2024-12-13 PROCEDURE — G8427 DOCREV CUR MEDS BY ELIG CLIN: HCPCS | Performed by: FAMILY MEDICINE

## 2024-12-13 PROCEDURE — G8417 CALC BMI ABV UP PARAM F/U: HCPCS | Performed by: FAMILY MEDICINE

## 2024-12-13 PROCEDURE — 3044F HG A1C LEVEL LT 7.0%: CPT | Performed by: FAMILY MEDICINE

## 2024-12-13 PROCEDURE — 99214 OFFICE O/P EST MOD 30 MIN: CPT | Performed by: FAMILY MEDICINE

## 2024-12-13 RX ORDER — MINOCYCLINE HYDROCHLORIDE 100 MG/1
100 CAPSULE ORAL 2 TIMES DAILY
COMMUNITY

## 2024-12-13 NOTE — PROGRESS NOTES
12/13/2024    Yaz Langford is a 54 y.o. female here for:    Chief Complaint   Patient presents with    Diabetes    Hypothyroidism    Knee Pain    Hidradenitis Suppuritiva        HPI:  T2DM  - Not currently on DM medications. Metformin was stopped in the past.  - On gabapentin 400 mg HS for diabetic neuropathy. Reports compliance with medication. Denies side effects of medication. Neuropathy is well-controlled.   - Not on an ACE inhibitor or ARB due to low BP.   - Not on a statin due to LDL <70 naturally.    The 10-year ASCVD risk score (Dread MAGAÑA, et al., 2019) is: 1.8%    Values used to calculate the score:      Age: 54 years      Sex: Female      Is Non- : No      Diabetic: Yes      Tobacco smoker: No      Systolic Blood Pressure: 104 mmHg      Is BP treated: No      HDL Cholesterol: 61 mg/dL      Total Cholesterol: 136 mg/dL    Hypothyroidism   - On Synthroid 75 mcg QD. Reports compliance with medication. Denies side effects of medication.   - Denies unintended weight loss, palpitations, hair loss, and fatigue.     Chronic knee pain   - On Celebrex 200 mg BID and tramadol 50 mg TID PRN. Reports compliance with medications. Denies side effects of medications.   - Right knee pain is still throbbing. Having right-sided low back pain and right hip pain. Does not want to get X-rays or do further evaluation at this time.    HS  - On minocycline 100 mg BID and clindamycin lotion BID. Reports an improvement in her symptoms overall.     Current Outpatient Medications   Medication Sig Dispense Refill    minocycline (MINOCIN;DYNACIN) 100 MG capsule Take 1 capsule by mouth 2 times daily      traMADol (ULTRAM) 50 MG tablet Take 1 tablet by mouth every 8 hours as needed for Pain for up to 30 days. Max Daily Amount: 150 mg 90 tablet 0    gabapentin (NEURONTIN) 400 MG capsule Take 1 capsule by mouth nightly for 90 days. 90 capsule 0    clindamycin (CLEOCIN T) 1 % lotion Apply topically 2 times

## 2024-12-22 DIAGNOSIS — L73.2 HIDRADENITIS SUPPURATIVA: Primary | ICD-10-CM

## 2024-12-22 DIAGNOSIS — E11.40 TYPE 2 DIABETES MELLITUS WITH DIABETIC NEUROPATHY, WITHOUT LONG-TERM CURRENT USE OF INSULIN (HCC): ICD-10-CM

## 2024-12-23 RX ORDER — MINOCYCLINE HYDROCHLORIDE 100 MG/1
100 CAPSULE ORAL 2 TIMES DAILY
Qty: 180 CAPSULE | Refills: 0 | Status: SHIPPED | OUTPATIENT
Start: 2024-12-23

## 2024-12-23 RX ORDER — GABAPENTIN 400 MG/1
400 CAPSULE ORAL NIGHTLY
Qty: 90 CAPSULE | Refills: 0 | Status: SHIPPED | OUTPATIENT
Start: 2024-12-23 | End: 2025-03-23

## 2024-12-23 RX ORDER — GABAPENTIN 100 MG/1
CAPSULE ORAL
Qty: 180 CAPSULE | Refills: 0 | OUTPATIENT
Start: 2024-12-23

## 2024-12-23 RX ORDER — MINOCYCLINE HYDROCHLORIDE 100 MG/1
100 CAPSULE ORAL 2 TIMES DAILY
Qty: 180 CAPSULE | Refills: 0 | OUTPATIENT
Start: 2024-12-23

## 2024-12-23 NOTE — TELEPHONE ENCOUNTER
Name of Medication(s) Requested:  Requested Prescriptions     Refused Prescriptions Disp Refills    minocycline (MINOCIN;DYNACIN) 100 MG capsule [Pharmacy Med Name: Minocycline HCl Oral Capsule 100 MG] 180 capsule 0     Sig: TAKE ONE CAPSULE BY MOUTH TWO TIMES A DAY     Refused By: COLBY MCKENZIE     Reason for Refusal: Other    gabapentin (NEURONTIN) 100 MG capsule [Pharmacy Med Name: Gabapentin Oral Capsule 100 MG] 180 capsule 0     Sig: TAKE TWO CAPSULES BY MOUTH NIGHTLY FOR 90 DAYS     Refused By: COLBY MCKENZIE     Reason for Refusal: Medication discontinued       Medication is on current medication list Yes    Dosage and directions were verified? Yes    Quantity verified: 90 day supply     Pharmacy Verified?  Yes    Last Appointment:  12/13/2024    Future appts:  Future Appointments   Date Time Provider Department Center   3/14/2025 10:00 AM Marlene Mccollum DO [place] [facility] ECC DEP        (If no appt send self scheduling link. .REFILLAPPT)  Scheduling request sent?     [] Yes  [x] No    Does patient need updated?  [] Yes  [x] No

## 2025-01-18 DIAGNOSIS — G89.29 BILATERAL CHRONIC KNEE PAIN: ICD-10-CM

## 2025-01-18 DIAGNOSIS — M25.562 BILATERAL CHRONIC KNEE PAIN: ICD-10-CM

## 2025-01-18 DIAGNOSIS — M25.561 BILATERAL CHRONIC KNEE PAIN: ICD-10-CM

## 2025-01-20 RX ORDER — TRAMADOL HYDROCHLORIDE 50 MG/1
50 TABLET ORAL EVERY 8 HOURS PRN
Qty: 90 TABLET | Refills: 0 | Status: SHIPPED | OUTPATIENT
Start: 2025-01-20 | End: 2025-02-19

## 2025-01-20 NOTE — TELEPHONE ENCOUNTER
Name of Medication(s) Requested:  Requested Prescriptions     Pending Prescriptions Disp Refills    traMADol (ULTRAM) 50 MG tablet [Pharmacy Med Name: traMADol HCl Oral Tablet 50 MG] 90 tablet 0     Sig: Take 1 tablet by mouth every 8 hours as needed for Pain for up to 30 days. Max Daily Amount: 150 mg       Medication is on current medication list Yes    Dosage and directions were verified? Yes    Quantity verified: 30 day supply     Pharmacy Verified?  Yes    Last Appointment:  12/13/2024    Future appts:  Future Appointments   Date Time Provider Department Center   3/14/2025 10:00 AM Marlene Mccollum DO [place] [facility] E[facility] DEP        (If no appt send self scheduling link. .REFILLAPPT)  Scheduling request sent?     [] Yes  [x] No    Does patient need updated?  [] Yes  [x] No

## 2025-02-21 DIAGNOSIS — M25.561 BILATERAL CHRONIC KNEE PAIN: ICD-10-CM

## 2025-02-21 DIAGNOSIS — G89.29 BILATERAL CHRONIC KNEE PAIN: ICD-10-CM

## 2025-02-21 DIAGNOSIS — M25.562 BILATERAL CHRONIC KNEE PAIN: ICD-10-CM

## 2025-02-21 RX ORDER — TRAMADOL HYDROCHLORIDE 50 MG/1
50 TABLET ORAL EVERY 8 HOURS PRN
Qty: 90 TABLET | Refills: 0 | Status: SHIPPED | OUTPATIENT
Start: 2025-02-21 | End: 2025-03-23

## 2025-02-21 NOTE — TELEPHONE ENCOUNTER
Name of Medication(s) Requested:  Requested Prescriptions     Pending Prescriptions Disp Refills    traMADol (ULTRAM) 50 MG tablet 90 tablet 0     Sig: Take 1 tablet by mouth every 8 hours as needed for Pain for up to 30 days. Max Daily Amount: 150 mg       Medication is on current medication list Yes    Dosage and directions were verified? Yes    Quantity verified: 30 day supply     Pharmacy Verified?  Yes    Last Appointment:  12/13/2024    Future appts:  Future Appointments   Date Time Provider Department Center   3/17/2025  2:20 PM Deng Varela APRN - [facility]   3/21/2025 12:30 PM Marlene Mccollum DO [place] [facility] DEP        (If no appt send self scheduling link. .REFILLAPPT)  Scheduling request sent?     [] Yes  [x] No    Does patient need updated?  [] Yes  [x] No

## 2025-02-23 DIAGNOSIS — M25.561 BILATERAL CHRONIC KNEE PAIN: ICD-10-CM

## 2025-02-23 DIAGNOSIS — G89.29 BILATERAL CHRONIC KNEE PAIN: ICD-10-CM

## 2025-02-23 DIAGNOSIS — M25.562 BILATERAL CHRONIC KNEE PAIN: ICD-10-CM

## 2025-02-24 RX ORDER — CELECOXIB 200 MG/1
CAPSULE ORAL
Qty: 180 CAPSULE | Refills: 1 | Status: SHIPPED | OUTPATIENT
Start: 2025-02-24

## 2025-02-24 NOTE — TELEPHONE ENCOUNTER
Name of Medication(s) Requested:  Requested Prescriptions     Pending Prescriptions Disp Refills    celecoxib (CELEBREX) 200 MG capsule [Pharmacy Med Name: Celecoxib Oral Capsule 200 MG] 180 capsule 0     Sig: TAKE ONE CAPSULE BY MOUTH TWO TIMES DAILY       Medication is on current medication list Yes    Dosage and directions were verified? Yes    Quantity verified: 90 day supply     Pharmacy Verified?  Yes    Last Appointment:  12/13/2024    Future appts:  Future Appointments   Date Time Provider Department Center   3/17/2025  2:20 PM Deng Varela APRN - [facility]   3/21/2025 12:30 PM Marlene Mccollum DO [place] [facility] DEP        (If no appt send self scheduling link. .REFILLAPPT)  Scheduling request sent?     [] Yes  [x] No    Does patient need updated?  [] Yes  [x] No

## 2025-03-05 DIAGNOSIS — E03.9 ACQUIRED HYPOTHYROIDISM: ICD-10-CM

## 2025-03-05 RX ORDER — LEVOTHYROXINE SODIUM 75 UG/1
75 TABLET ORAL DAILY
Qty: 90 TABLET | Refills: 1 | Status: SHIPPED | OUTPATIENT
Start: 2025-03-05

## 2025-03-05 NOTE — TELEPHONE ENCOUNTER
Name of Medication(s) Requested:  Requested Prescriptions     Pending Prescriptions Disp Refills    levothyroxine (SYNTHROID) 75 MCG tablet [Pharmacy Med Name: Levothyroxine Sodium Oral Tablet 75 MCG] 90 tablet 1     Sig: TAKE ONE TABLET BY MOUTH DAILY       Medication is on current medication list Yes    Dosage and directions were verified? Yes    Quantity verified: 90 day supply     Pharmacy Verified?  Yes    Last Appointment:  12/13/2024    Future appts:  Future Appointments   Date Time Provider Department Center   3/17/2025  2:20 PM Deng Varela APRN - [facility]   3/21/2025 12:30 PM Marlene Mccollum DO [place] [facility] DEP        (If no appt send self scheduling link. .REFILLAPPT)  Scheduling request sent?     [] Yes  [x] No    Does patient need updated?  [] Yes  [x] No

## 2025-03-17 DIAGNOSIS — E03.9 ACQUIRED HYPOTHYROIDISM: ICD-10-CM

## 2025-03-17 DIAGNOSIS — E11.42 TYPE 2 DIABETES MELLITUS WITH DIABETIC POLYNEUROPATHY, WITHOUT LONG-TERM CURRENT USE OF INSULIN (HCC): ICD-10-CM

## 2025-03-17 DIAGNOSIS — D64.9 ANEMIA, UNSPECIFIED TYPE: ICD-10-CM

## 2025-03-17 LAB
ALBUMIN: 3.8 G/DL (ref 3.5–5.2)
ALP BLD-CCNC: 64 U/L (ref 35–104)
ALT SERPL-CCNC: 16 U/L (ref 0–32)
ANION GAP SERPL CALCULATED.3IONS-SCNC: 15 MMOL/L (ref 7–16)
AST SERPL-CCNC: 14 U/L (ref 0–31)
BASOPHILS ABSOLUTE: 0.03 K/UL (ref 0–0.2)
BASOPHILS RELATIVE PERCENT: 0 % (ref 0–2)
BILIRUB SERPL-MCNC: 0.6 MG/DL (ref 0–1.2)
BUN BLDV-MCNC: 10 MG/DL (ref 6–20)
CALCIUM SERPL-MCNC: 9.4 MG/DL (ref 8.6–10.2)
CHLORIDE BLD-SCNC: 100 MMOL/L (ref 98–107)
CHOLESTEROL, TOTAL: 145 MG/DL
CO2: 25 MMOL/L (ref 22–29)
CREAT SERPL-MCNC: 0.7 MG/DL (ref 0.5–1)
CREATININE URINE: 152.2 MG/DL (ref 29–226)
EOSINOPHILS ABSOLUTE: 0.16 K/UL (ref 0.05–0.5)
EOSINOPHILS RELATIVE PERCENT: 2 % (ref 0–6)
FERRITIN: 369 NG/ML
GFR, ESTIMATED: >90 ML/MIN/1.73M2
GLUCOSE BLD-MCNC: 159 MG/DL (ref 74–99)
HBA1C MFR BLD: 7.1 % (ref 4–5.6)
HCT VFR BLD CALC: 37.9 % (ref 34–48)
HDLC SERPL-MCNC: 54 MG/DL
HEMOGLOBIN: 12.1 G/DL (ref 11.5–15.5)
IMMATURE GRANULOCYTES %: 0 % (ref 0–5)
IMMATURE GRANULOCYTES ABSOLUTE: 0.03 K/UL (ref 0–0.58)
IRON % SATURATION: 37 % (ref 15–50)
IRON: 80 UG/DL (ref 37–145)
LDL CHOLESTEROL: 70 MG/DL
LYMPHOCYTES ABSOLUTE: 1.52 K/UL (ref 1.5–4)
LYMPHOCYTES RELATIVE PERCENT: 22 % (ref 20–42)
MCH RBC QN AUTO: 29 PG (ref 26–35)
MCHC RBC AUTO-ENTMCNC: 31.9 G/DL (ref 32–34.5)
MCV RBC AUTO: 90.9 FL (ref 80–99.9)
MICROALBUMIN/CREAT 24H UR: <12 MG/L (ref 0–19)
MICROALBUMIN/CREAT UR-RTO: NORMAL MCG/MG CREAT (ref 0–30)
MONOCYTES ABSOLUTE: 0.41 K/UL (ref 0.1–0.95)
MONOCYTES RELATIVE PERCENT: 6 % (ref 2–12)
NEUTROPHILS ABSOLUTE: 4.82 K/UL (ref 1.8–7.3)
NEUTROPHILS RELATIVE PERCENT: 69 % (ref 43–80)
PDW BLD-RTO: 13.6 % (ref 11.5–15)
PLATELET # BLD: 198 K/UL (ref 130–450)
PMV BLD AUTO: 11.4 FL (ref 7–12)
POTASSIUM SERPL-SCNC: 4.1 MMOL/L (ref 3.5–5)
RBC # BLD: 4.17 M/UL (ref 3.5–5.5)
SODIUM BLD-SCNC: 140 MMOL/L (ref 132–146)
TOTAL IRON BINDING CAPACITY: 218 UG/DL (ref 250–450)
TOTAL PROTEIN: 6.8 G/DL (ref 6.4–8.3)
TRIGL SERPL-MCNC: 105 MG/DL
TSH SERPL DL<=0.05 MIU/L-ACNC: 7.9 UIU/ML (ref 0.27–4.2)
VLDLC SERPL CALC-MCNC: 21 MG/DL
WBC # BLD: 7 K/UL (ref 4.5–11.5)

## 2025-03-21 ENCOUNTER — OFFICE VISIT (OUTPATIENT)
Dept: FAMILY MEDICINE CLINIC | Age: 55
End: 2025-03-21
Payer: COMMERCIAL

## 2025-03-21 VITALS
BODY MASS INDEX: 41.95 KG/M2 | HEIGHT: 70 IN | OXYGEN SATURATION: 96 % | SYSTOLIC BLOOD PRESSURE: 142 MMHG | TEMPERATURE: 98.1 F | DIASTOLIC BLOOD PRESSURE: 98 MMHG | RESPIRATION RATE: 20 BRPM | HEART RATE: 80 BPM | WEIGHT: 293 LBS

## 2025-03-21 DIAGNOSIS — M25.561 BILATERAL CHRONIC KNEE PAIN: ICD-10-CM

## 2025-03-21 DIAGNOSIS — G89.29 BILATERAL CHRONIC KNEE PAIN: ICD-10-CM

## 2025-03-21 DIAGNOSIS — E11.42 TYPE 2 DIABETES MELLITUS WITH DIABETIC POLYNEUROPATHY, WITHOUT LONG-TERM CURRENT USE OF INSULIN (HCC): Primary | ICD-10-CM

## 2025-03-21 DIAGNOSIS — M25.562 BILATERAL CHRONIC KNEE PAIN: ICD-10-CM

## 2025-03-21 DIAGNOSIS — I10 ESSENTIAL HYPERTENSION: ICD-10-CM

## 2025-03-21 DIAGNOSIS — E03.9 ACQUIRED HYPOTHYROIDISM: ICD-10-CM

## 2025-03-21 PROCEDURE — 3051F HG A1C>EQUAL 7.0%<8.0%: CPT | Performed by: FAMILY MEDICINE

## 2025-03-21 PROCEDURE — 3079F DIAST BP 80-89 MM HG: CPT | Performed by: FAMILY MEDICINE

## 2025-03-21 PROCEDURE — 3074F SYST BP LT 130 MM HG: CPT | Performed by: FAMILY MEDICINE

## 2025-03-21 PROCEDURE — 3017F COLORECTAL CA SCREEN DOC REV: CPT | Performed by: FAMILY MEDICINE

## 2025-03-21 PROCEDURE — G8427 DOCREV CUR MEDS BY ELIG CLIN: HCPCS | Performed by: FAMILY MEDICINE

## 2025-03-21 PROCEDURE — 2022F DILAT RTA XM EVC RTNOPTHY: CPT | Performed by: FAMILY MEDICINE

## 2025-03-21 PROCEDURE — 1036F TOBACCO NON-USER: CPT | Performed by: FAMILY MEDICINE

## 2025-03-21 PROCEDURE — G8417 CALC BMI ABV UP PARAM F/U: HCPCS | Performed by: FAMILY MEDICINE

## 2025-03-21 PROCEDURE — 99214 OFFICE O/P EST MOD 30 MIN: CPT | Performed by: FAMILY MEDICINE

## 2025-03-21 RX ORDER — LEVOTHYROXINE SODIUM 88 UG/1
88 TABLET ORAL DAILY
Qty: 90 TABLET | Refills: 0 | Status: SHIPPED | OUTPATIENT
Start: 2025-03-21

## 2025-03-21 RX ORDER — HYDROCHLOROTHIAZIDE 12.5 MG/1
12.5 CAPSULE ORAL EVERY MORNING
Qty: 90 CAPSULE | Refills: 0 | Status: SHIPPED | OUTPATIENT
Start: 2025-03-21

## 2025-03-21 RX ORDER — LISINOPRIL 5 MG/1
5 TABLET ORAL DAILY
Qty: 90 TABLET | Refills: 0 | Status: SHIPPED
Start: 2025-03-21 | End: 2025-03-21

## 2025-03-21 RX ORDER — TRAMADOL HYDROCHLORIDE 50 MG/1
50 TABLET ORAL EVERY 8 HOURS PRN
Qty: 90 TABLET | Refills: 0 | Status: SHIPPED | OUTPATIENT
Start: 2025-03-23 | End: 2025-04-22

## 2025-03-21 SDOH — ECONOMIC STABILITY: FOOD INSECURITY: WITHIN THE PAST 12 MONTHS, YOU WORRIED THAT YOUR FOOD WOULD RUN OUT BEFORE YOU GOT MONEY TO BUY MORE.: NEVER TRUE

## 2025-03-21 SDOH — ECONOMIC STABILITY: FOOD INSECURITY: WITHIN THE PAST 12 MONTHS, THE FOOD YOU BOUGHT JUST DIDN'T LAST AND YOU DIDN'T HAVE MONEY TO GET MORE.: NEVER TRUE

## 2025-03-21 ASSESSMENT — PATIENT HEALTH QUESTIONNAIRE - PHQ9
1. LITTLE INTEREST OR PLEASURE IN DOING THINGS: NOT AT ALL
SUM OF ALL RESPONSES TO PHQ QUESTIONS 1-9: 0
2. FEELING DOWN, DEPRESSED OR HOPELESS: NOT AT ALL
SUM OF ALL RESPONSES TO PHQ QUESTIONS 1-9: 0

## 2025-03-21 NOTE — PROGRESS NOTES
3/21/2025    Yaz Langford is a 54 y.o. female here for:    Chief Complaint   Patient presents with    Diabetes     3 month med check    Hypothyroidism     3 month med check    Knee Pain     3 month med check        HPI:  T2DM  - Not currently on DM medications. Metformin was stopped in the past.  - On gabapentin 400 mg HS for diabetic neuropathy. Reports compliance with medication. Denies side effects of medication. Neuropathy is well-controlled.   - Not on an ACE inhibitor or ARB due to low BP in the past.   - Not on a statin due to LDL <70 naturally.    The 10-year ASCVD risk score (Dread MAGAÑA, et al., 2019) is: 3.8%    Values used to calculate the score:      Age: 54 years      Sex: Female      Is Non- : No      Diabetic: Yes      Tobacco smoker: No      Systolic Blood Pressure: 142 mmHg      Is BP treated: No      HDL Cholesterol: 54 mg/dL      Total Cholesterol: 145 mg/dL    Hypothyroidism   - On Synthroid 75 mcg QD. Thinks that she may have missed 2-3 doses of medication recently. Denies side effects of medication.   - Denies palpitations, hair loss, and fatigue.     Chronic knee pain   - On Celebrex 200 mg BID and tramadol 50 mg TID PRN. Reports compliance with medications. Denies side effects of medications.   - Both knees are still very bothersome. Patient gained 15 lbs over the winter. Really wanting the weather to get nice so she can walk outside.     Current Outpatient Medications   Medication Sig Dispense Refill    [START ON 3/23/2025] traMADol (ULTRAM) 50 MG tablet Take 1 tablet by mouth every 8 hours as needed for Pain for up to 30 days. Max Daily Amount: 150 mg 90 tablet 0    levothyroxine (SYNTHROID) 88 MCG tablet Take 1 tablet by mouth daily 90 tablet 0    hydroCHLOROthiazide 12.5 MG capsule Take 1 capsule by mouth every morning 90 capsule 0    celecoxib (CELEBREX) 200 MG capsule TAKE ONE CAPSULE BY MOUTH TWO TIMES DAILY 180 capsule 1    minocycline (MINOCIN;DYNACIN)

## 2025-03-30 DIAGNOSIS — L73.2 HIDRADENITIS SUPPURATIVA: ICD-10-CM

## 2025-03-31 RX ORDER — MINOCYCLINE HYDROCHLORIDE 100 MG/1
CAPSULE ORAL
Qty: 180 CAPSULE | Refills: 1 | OUTPATIENT
Start: 2025-03-31

## 2025-04-06 DIAGNOSIS — L73.2 HIDRADENITIS SUPPURATIVA: ICD-10-CM

## 2025-04-07 RX ORDER — MINOCYCLINE HYDROCHLORIDE 100 MG/1
CAPSULE ORAL
Qty: 180 CAPSULE | Refills: 0 | OUTPATIENT
Start: 2025-04-07

## 2025-04-11 DIAGNOSIS — L73.2 HIDRADENITIS SUPPURATIVA: ICD-10-CM

## 2025-04-11 RX ORDER — MINOCYCLINE HYDROCHLORIDE 100 MG/1
100 CAPSULE ORAL 2 TIMES DAILY
Qty: 180 CAPSULE | Refills: 0 | Status: SHIPPED | OUTPATIENT
Start: 2025-04-11

## 2025-04-11 NOTE — TELEPHONE ENCOUNTER
Name of Medication(s) Requested:  Requested Prescriptions     Pending Prescriptions Disp Refills    minocycline (MINOCIN;DYNACIN) 100 MG capsule 180 capsule 0     Sig: Take 1 capsule by mouth 2 times daily       Medication is on current medication list Yes    Dosage and directions were verified? Yes    Quantity verified: 90 day supply     Pharmacy Verified?  Yes    Last Appointment:  3/21/2025    Future appts:  Future Appointments   Date Time Provider Department Center   5/9/2025 11:00 AM Marlene Mccollum DO [place] [facility] DEP        (If no appt send self scheduling link. .REFILLAPPT)  Scheduling request sent?     [] Yes  [x] No    Does patient need updated?  [] Yes  [x] No

## 2025-04-18 DIAGNOSIS — L73.2 HIDRADENITIS SUPPURATIVA: ICD-10-CM

## 2025-04-18 RX ORDER — CLINDAMYCIN PHOSPHATE 10 UG/ML
LOTION TOPICAL 2 TIMES DAILY
Qty: 60 ML | Refills: 5 | Status: SHIPPED | OUTPATIENT
Start: 2025-04-18

## 2025-04-27 DIAGNOSIS — G89.29 BILATERAL CHRONIC KNEE PAIN: ICD-10-CM

## 2025-04-27 DIAGNOSIS — M25.561 BILATERAL CHRONIC KNEE PAIN: ICD-10-CM

## 2025-04-27 DIAGNOSIS — E11.40 TYPE 2 DIABETES MELLITUS WITH DIABETIC NEUROPATHY, WITHOUT LONG-TERM CURRENT USE OF INSULIN (HCC): ICD-10-CM

## 2025-04-27 DIAGNOSIS — M25.562 BILATERAL CHRONIC KNEE PAIN: ICD-10-CM

## 2025-04-27 NOTE — TELEPHONE ENCOUNTER
Name of Medication(s) Requested:  Requested Prescriptions     Pending Prescriptions Disp Refills    gabapentin (NEURONTIN) 400 MG capsule [Pharmacy Med Name: Gabapentin Oral Capsule 400 MG] 90 capsule 0     Sig: TAKE ONE CAPSULE BY MOUTH NIGHTLY    traMADol (ULTRAM) 50 MG tablet [Pharmacy Med Name: traMADol HCl Oral Tablet 50 MG] 90 tablet 0     Sig: Take 1 tablet by mouth every 8 hours as needed for Pain for up to 30 days. Max Daily Amount: 150 mg       Medication is on current medication list Yes    Dosage and directions were verified? Yes    Quantity verified: 90 day supply     Pharmacy Verified?  Yes    Last Appointment:  3/21/2025    Future appts:  Future Appointments   Date Time Provider Department Center   5/9/2025 11:00 AM Marlene Mccollum DO [place] [facility] DEP        (If no appt send self scheduling link. .REFILLAPPT)  Scheduling request sent?     [] Yes  [x] No    Does patient need updated?  [] Yes  [x] No

## 2025-04-28 RX ORDER — GABAPENTIN 400 MG/1
400 CAPSULE ORAL DAILY
Qty: 90 CAPSULE | Refills: 0 | Status: SHIPPED | OUTPATIENT
Start: 2025-04-28 | End: 2025-07-27

## 2025-04-28 RX ORDER — TRAMADOL HYDROCHLORIDE 50 MG/1
50 TABLET ORAL EVERY 8 HOURS PRN
Qty: 90 TABLET | Refills: 0 | Status: SHIPPED | OUTPATIENT
Start: 2025-04-28 | End: 2025-05-28

## 2025-05-26 NOTE — TELEPHONE ENCOUNTER
Name of Medication(s) Requested:  Requested Prescriptions     Pending Prescriptions Disp Refills    clindamycin (CLEOCIN T) 1 % lotion [Pharmacy Med Name: Clindamycin Phosphate External Lotion 1 %] 60 mL 0     Sig: apply topically 2 times daily       Medication is on current medication list Yes    Dosage and directions were verified? Yes    Quantity verified: 30 day supply     Pharmacy Verified?  Yes    Last Appointment:  3/21/2025    Future appts:  Future Appointments   Date Time Provider Department Center   5/9/2025 11:00 AM Marlene Mccollum DO [place] [facility] DEP        (If no appt send self scheduling link. .REFILLAPPT)  Scheduling request sent?     [] Yes  [x] No    Does patient need updated?  [] Yes  [x] No  
[facility] provides services at a reduced cost to those who are determined to be eligible through [facility]’s financial assistance program. Information regarding [facility]’s financial assistance program can be found by going to [email or web address] or by calling [phone].

## 2025-06-01 DIAGNOSIS — G89.29 BILATERAL CHRONIC KNEE PAIN: ICD-10-CM

## 2025-06-01 DIAGNOSIS — M25.561 BILATERAL CHRONIC KNEE PAIN: ICD-10-CM

## 2025-06-01 DIAGNOSIS — M25.562 BILATERAL CHRONIC KNEE PAIN: ICD-10-CM

## 2025-06-02 RX ORDER — TRAMADOL HYDROCHLORIDE 50 MG/1
50 TABLET ORAL EVERY 8 HOURS PRN
Qty: 90 TABLET | Refills: 0 | Status: SHIPPED | OUTPATIENT
Start: 2025-06-02 | End: 2025-08-31

## 2025-06-02 NOTE — TELEPHONE ENCOUNTER
Name of Medication(s) Requested:  Requested Prescriptions     Pending Prescriptions Disp Refills    traMADol (ULTRAM) 50 MG tablet [Pharmacy Med Name: traMADol HCl Oral Tablet 50 MG] 90 tablet 0     Sig: TAKE ONE TABLET BY MOUTH EVERY 8 HOURS AS NEEDED FOR PAIN FOR UP TO 30 DAYS. MAX DAILY AMOUNT: 150MG. REDUCE DOSES TAKEN AS PAIN BECOMES MANAGEABLE.       Medication is on current medication list Yes    Dosage and directions were verified? Yes    Quantity verified: 30 day supply     Pharmacy Verified?  Yes    Last Appointment:  3/21/2025    Future appts:  Future Appointments   Date Time Provider Department Center   6/30/2025 11:30 AM Marlene Mccollum DO [place] [facility] DEP        (If no appt send self scheduling link. .REFILLAPPT)  Scheduling request sent?     [] Yes  [x] No    Does patient need updated?  [] Yes  [x] No

## 2025-06-14 DIAGNOSIS — E03.9 ACQUIRED HYPOTHYROIDISM: ICD-10-CM

## 2025-06-14 DIAGNOSIS — I10 ESSENTIAL HYPERTENSION: ICD-10-CM

## 2025-06-15 NOTE — TELEPHONE ENCOUNTER
Name of Medication(s) Requested:  Requested Prescriptions     Pending Prescriptions Disp Refills    hydroCHLOROthiazide 12.5 MG capsule [Pharmacy Med Name: hydroCHLOROthiazide Oral Capsule 12.5 MG] 90 capsule 1     Sig: TAKE ONE CAPSULE BY MOUTH EVERY MORNING    levothyroxine (SYNTHROID) 88 MCG tablet [Pharmacy Med Name: Levothyroxine Sodium Oral Tablet 88 MCG] 90 tablet 1     Sig: TAKE ONE TABLET BY MOUTH DAILY       Medication is on current medication list Yes    Dosage and directions were verified? Yes    Quantity verified: 90 day supply     Pharmacy Verified?  Yes    Last Appointment:  3/21/2025    Future appts:  Future Appointments   Date Time Provider Department Center   6/30/2025 11:30 AM Marlene Mccollum DO [place] [facility] DEP        (If no appt send self scheduling link. .REFILLAPPT)  Scheduling request sent?     [] Yes  [x] No    Does patient need updated?  [] Yes  [x] No

## 2025-06-16 RX ORDER — HYDROCHLOROTHIAZIDE 12.5 MG/1
12.5 CAPSULE ORAL EVERY MORNING
Qty: 90 CAPSULE | Refills: 0 | Status: SHIPPED | OUTPATIENT
Start: 2025-06-16

## 2025-06-16 RX ORDER — LEVOTHYROXINE SODIUM 88 UG/1
88 TABLET ORAL DAILY
Qty: 90 TABLET | Refills: 0 | Status: SHIPPED | OUTPATIENT
Start: 2025-06-16

## 2025-06-19 LAB — DIABETIC RETINOPATHY: NEGATIVE

## 2025-06-23 DIAGNOSIS — G89.29 BILATERAL CHRONIC KNEE PAIN: ICD-10-CM

## 2025-06-23 DIAGNOSIS — M25.562 BILATERAL CHRONIC KNEE PAIN: ICD-10-CM

## 2025-06-23 DIAGNOSIS — M25.561 BILATERAL CHRONIC KNEE PAIN: ICD-10-CM

## 2025-06-23 RX ORDER — TRAMADOL HYDROCHLORIDE 50 MG/1
50 TABLET ORAL EVERY 8 HOURS PRN
Qty: 90 TABLET | Refills: 0 | Status: SHIPPED | OUTPATIENT
Start: 2025-07-02 | End: 2025-08-01

## 2025-06-23 NOTE — TELEPHONE ENCOUNTER
Name of Medication(s) Requested:  Requested Prescriptions     Pending Prescriptions Disp Refills    traMADol (ULTRAM) 50 MG tablet 90 tablet 0     Sig: Take 1 tablet by mouth every 8 hours as needed for Pain for up to 90 days. Max Daily Amount: 150 mg       Medication is on current medication list Yes    Dosage and directions were verified? Yes    Quantity verified: 30 day supply     Pharmacy Verified?  Yes    Last Appointment:  3/21/2025    Future appts:  Future Appointments   Date Time Provider Department Center   6/30/2025 11:30 AM Marlene Mccollum DO [place] [facility] E[facility] DEP        (If no appt send self scheduling link. .REFILLAPPT)  Scheduling request sent?     [] Yes  [x] No    Does patient need updated?  [] Yes  [x] No

## 2025-06-28 ENCOUNTER — HOSPITAL ENCOUNTER (OUTPATIENT)
Age: 55
Discharge: HOME | End: 2025-06-28
Payer: COMMERCIAL

## 2025-06-28 DIAGNOSIS — I10 ESSENTIAL HYPERTENSION: ICD-10-CM

## 2025-06-28 DIAGNOSIS — E03.9 ACQUIRED HYPOTHYROIDISM: ICD-10-CM

## 2025-06-28 LAB
ANION GAP SERPL CALCULATED.3IONS-SCNC: 12 MMOL/L (ref 7–16)
BUN SERPL-MCNC: 15 MG/DL (ref 6–20)
CALCIUM SERPL-MCNC: 9.2 MG/DL (ref 8.6–10)
CHLORIDE SERPL-SCNC: 101 MMOL/L (ref 98–107)
CO2 SERPL-SCNC: 27 MMOL/L (ref 22–29)
CREAT SERPL-MCNC: 0.7 MG/DL (ref 0.5–1)
GFR, ESTIMATED: >90 ML/MIN/1.73M2
GLUCOSE SERPL-MCNC: 226 MG/DL (ref 74–99)
MAGNESIUM SERPL-MCNC: 1.7 MG/DL (ref 1.6–2.6)
POTASSIUM SERPL-SCNC: 4.6 MMOL/L (ref 3.5–5.1)
SODIUM SERPL-SCNC: 141 MMOL/L (ref 136–145)
TSH SERPL DL<=0.05 MIU/L-ACNC: 3.61 UIU/ML (ref 0.27–4.2)

## 2025-06-28 PROCEDURE — 83735 ASSAY OF MAGNESIUM: CPT

## 2025-06-28 PROCEDURE — 80048 BASIC METABOLIC PNL TOTAL CA: CPT

## 2025-06-28 PROCEDURE — 36415 COLL VENOUS BLD VENIPUNCTURE: CPT

## 2025-06-28 PROCEDURE — 84443 ASSAY THYROID STIM HORMONE: CPT

## 2025-06-30 ENCOUNTER — OFFICE VISIT (OUTPATIENT)
Dept: FAMILY MEDICINE CLINIC | Age: 55
End: 2025-06-30
Payer: COMMERCIAL

## 2025-06-30 VITALS
WEIGHT: 293 LBS | TEMPERATURE: 98 F | DIASTOLIC BLOOD PRESSURE: 82 MMHG | SYSTOLIC BLOOD PRESSURE: 124 MMHG | HEART RATE: 76 BPM | BODY MASS INDEX: 53.75 KG/M2 | OXYGEN SATURATION: 96 %

## 2025-06-30 DIAGNOSIS — E11.42 TYPE 2 DIABETES MELLITUS WITH DIABETIC POLYNEUROPATHY, WITHOUT LONG-TERM CURRENT USE OF INSULIN (HCC): Primary | ICD-10-CM

## 2025-06-30 DIAGNOSIS — I10 ESSENTIAL HYPERTENSION: ICD-10-CM

## 2025-06-30 DIAGNOSIS — E03.9 ACQUIRED HYPOTHYROIDISM: ICD-10-CM

## 2025-06-30 DIAGNOSIS — M17.0 PRIMARY OSTEOARTHRITIS OF BOTH KNEES: ICD-10-CM

## 2025-06-30 DIAGNOSIS — M18.12 PRIMARY OSTEOARTHRITIS OF FIRST CARPOMETACARPAL JOINT OF LEFT HAND: ICD-10-CM

## 2025-06-30 PROCEDURE — 3051F HG A1C>EQUAL 7.0%<8.0%: CPT | Performed by: FAMILY MEDICINE

## 2025-06-30 PROCEDURE — 99214 OFFICE O/P EST MOD 30 MIN: CPT | Performed by: FAMILY MEDICINE

## 2025-06-30 PROCEDURE — 3079F DIAST BP 80-89 MM HG: CPT | Performed by: FAMILY MEDICINE

## 2025-06-30 PROCEDURE — 3074F SYST BP LT 130 MM HG: CPT | Performed by: FAMILY MEDICINE

## 2025-06-30 RX ORDER — TRAMADOL HYDROCHLORIDE 50 MG/1
50 TABLET ORAL EVERY 8 HOURS PRN
Qty: 90 TABLET | Refills: 0 | Status: SHIPPED | OUTPATIENT
Start: 2025-07-02 | End: 2025-08-01

## 2025-06-30 NOTE — PROGRESS NOTES
7/2/2025] traMADol (ULTRAM) 50 MG tablet Take 1 tablet by mouth every 8 hours as needed for Pain for up to 30 days. Max Daily Amount: 150 mg 90 tablet 0    metFORMIN (GLUCOPHAGE) 500 MG tablet Take 1 tablet by mouth daily (with breakfast) 90 tablet 0    hydroCHLOROthiazide 12.5 MG capsule TAKE ONE CAPSULE BY MOUTH EVERY MORNING 90 capsule 0    levothyroxine (SYNTHROID) 88 MCG tablet TAKE ONE TABLET BY MOUTH DAILY 90 tablet 0    gabapentin (NEURONTIN) 400 MG capsule Take 1 capsule by mouth daily for 90 days. 90 capsule 0    clindamycin (CLEOCIN T) 1 % lotion apply topically 2 times daily 60 mL 5    minocycline (MINOCIN;DYNACIN) 100 MG capsule Take 1 capsule by mouth 2 times daily 180 capsule 0    celecoxib (CELEBREX) 200 MG capsule TAKE ONE CAPSULE BY MOUTH TWO TIMES DAILY 180 capsule 1    metroNIDAZOLE (METROCREAM) 0.75 % cream APPLY TO FACE TWICE DAILY      Coenzyme Q10 10 MG CAPS Take by mouth daily      POTASSIUM PO Take 300 mg by mouth at bedtime      CINNAMON PO Take by mouth daily      TURMERIC PO Take by mouth daily      Ferrous Sulfate (IRON PO) Take by mouth Daily      b complex vitamins capsule Take 1 capsule by mouth daily      Ascorbic Acid (VITAMIN C PO) Take by mouth daily       No current facility-administered medications for this visit.      No Known Allergies    Past Medical & Surgical History:      Diagnosis Date    Basal cell carcinoma (BCC) of skin of nose     Chronic pain of both knees     Class 3 severe obesity due to excess calories with serious comorbidity and body mass index (BMI) of 50.0 to 59.9 in adult (HCC)     Diverticulitis     Diverticulosis 09/14/2022    Hidradenitis suppurativa     History of DVT in adulthood     superficial, right lower extremity, provoked by surgery    Hypothyroidism     Kidney stone     Mild intermittent asthma without complication     MELCHOR (obstructive sleep apnea)     on CPAP, does not follow with Sleep Medicine since moving to [place]    Osteoarthritis     PCOS

## 2025-07-19 DIAGNOSIS — L73.2 HIDRADENITIS SUPPURATIVA: ICD-10-CM

## 2025-07-19 DIAGNOSIS — E11.40 TYPE 2 DIABETES MELLITUS WITH DIABETIC NEUROPATHY, WITHOUT LONG-TERM CURRENT USE OF INSULIN (HCC): ICD-10-CM

## 2025-07-21 RX ORDER — MINOCYCLINE HYDROCHLORIDE 100 MG/1
100 CAPSULE ORAL 2 TIMES DAILY
Qty: 180 CAPSULE | Refills: 1 | OUTPATIENT
Start: 2025-07-21

## 2025-07-21 RX ORDER — GABAPENTIN 400 MG/1
400 CAPSULE ORAL DAILY
Qty: 30 CAPSULE | Refills: 0 | Status: SHIPPED | OUTPATIENT
Start: 2025-07-25 | End: 2025-08-24

## 2025-07-21 NOTE — TELEPHONE ENCOUNTER
Name of Medication(s) Requested:  Requested Prescriptions     Pending Prescriptions Disp Refills    minocycline (MINOCIN;DYNACIN) 100 MG capsule [Pharmacy Med Name: Minocycline HCl Oral Capsule 100 MG] 180 capsule 1     Sig: TAKE ONE CAPSULE BY MOUTH TWO TIMES A DAY    gabapentin (NEURONTIN) 400 MG capsule [Pharmacy Med Name: Gabapentin Oral Capsule 400 MG] 90 capsule 0     Sig: Take 1 capsule by mouth daily for 90 days.       Medication is on current medication list Yes    Dosage and directions were verified? Yes    Quantity verified: 90 day supply     Pharmacy Verified?  Yes    Last Appointment:  6/30/2025    Future appts:  Future Appointments   Date Time Provider Department Center   9/30/2025  9:00 AM Marlene Mccollum DO [place] [facility] E[facility] DEP        (If no appt send self scheduling link. .REFILLAPPT)  Scheduling request sent?     [] Yes  [x] No    Does patient need updated?  [] Yes  [x] No

## 2025-07-22 NOTE — TELEPHONE ENCOUNTER
Have patient complete minocycline that she currently has on hand. She can also use the tea tree oil and clindamycin cream as well. I would like to try to have patient remain off of antibiotics for at least 3-6 months.

## 2025-07-22 NOTE — TELEPHONE ENCOUNTER
Pt informed medication minocycline has been refused for a refill.  She is concerned as she has 2 boils that just popped over the weekend.  One on her right thigh the other under her stomach.  She would like to know if she should finish the minocycline she has on hand or should she go back to using the tea tree oil with the clindamycin lotion?     Patient is also asking how long of a break are you talking about with out using the minocycline?    Please advise

## 2025-07-31 ENCOUNTER — TELEPHONE (OUTPATIENT)
Dept: FAMILY MEDICINE CLINIC | Age: 55
End: 2025-07-31

## 2025-07-31 DIAGNOSIS — I10 ESSENTIAL HYPERTENSION: Primary | ICD-10-CM

## 2025-07-31 RX ORDER — LISINOPRIL 5 MG/1
5 TABLET ORAL DAILY
Qty: 90 TABLET | Refills: 0 | Status: SHIPPED | OUTPATIENT
Start: 2025-07-31

## 2025-07-31 NOTE — TELEPHONE ENCOUNTER
Have patient stop HCTZ 12.5 mg QD. Also, have her stop any potassium supplements at this time. I am going to restart her on lisinopril 5 mg QD, which I put patient on when she first became my patient. New prescription sent to pharmacy. I would like patient to be scheduled in 3 weeks for BP recheck with Sowmy[facility]. She will need blood work 2-3 days prior to appointment. No need to fast.     Please schedule patient with Sowmya.

## 2025-07-31 NOTE — TELEPHONE ENCOUNTER
Patient called, she has been on HCTZ for about a month and half now, she is going to the bathroom 2-3 hour during the day, waking up to urinate several times at night. She just can't do it anymore. She is willing to try another medication for her blood pressure but she can not take the HCTZ anymore. She is going on vacation Saturday morning so if you call something else in before she leaves she will pick it up. She was on Spironolactone before, and it seemed to work for her.

## 2025-08-02 DIAGNOSIS — E11.40 TYPE 2 DIABETES MELLITUS WITH DIABETIC NEUROPATHY, WITHOUT LONG-TERM CURRENT USE OF INSULIN (HCC): ICD-10-CM

## 2025-08-03 RX ORDER — GABAPENTIN 400 MG/1
400 CAPSULE ORAL DAILY
Qty: 90 CAPSULE | Refills: 0 | OUTPATIENT
Start: 2025-08-03

## 2025-08-13 DIAGNOSIS — M25.561 BILATERAL CHRONIC KNEE PAIN: ICD-10-CM

## 2025-08-13 DIAGNOSIS — G89.29 BILATERAL CHRONIC KNEE PAIN: ICD-10-CM

## 2025-08-13 DIAGNOSIS — M25.562 BILATERAL CHRONIC KNEE PAIN: ICD-10-CM

## 2025-08-13 DIAGNOSIS — E11.40 TYPE 2 DIABETES MELLITUS WITH DIABETIC NEUROPATHY, WITHOUT LONG-TERM CURRENT USE OF INSULIN (HCC): ICD-10-CM

## 2025-08-13 RX ORDER — CELECOXIB 200 MG/1
200 CAPSULE ORAL 2 TIMES DAILY
Qty: 180 CAPSULE | Refills: 0 | Status: SHIPPED | OUTPATIENT
Start: 2025-08-13

## 2025-08-13 RX ORDER — GABAPENTIN 400 MG/1
400 CAPSULE ORAL NIGHTLY
Qty: 90 CAPSULE | Refills: 0 | Status: SHIPPED | OUTPATIENT
Start: 2025-08-13 | End: 2025-11-11

## 2025-08-22 ENCOUNTER — RESULTS FOLLOW-UP (OUTPATIENT)
Dept: FAMILY MEDICINE CLINIC | Age: 55
End: 2025-08-22

## 2025-08-22 ENCOUNTER — HOSPITAL ENCOUNTER (OUTPATIENT)
Dept: LAB | Age: 55
Discharge: HOME | End: 2025-08-22
Payer: COMMERCIAL

## 2025-08-22 DIAGNOSIS — I10 ESSENTIAL HYPERTENSION: ICD-10-CM

## 2025-08-22 DIAGNOSIS — E11.42 TYPE 2 DIABETES MELLITUS WITH DIABETIC POLYNEUROPATHY, WITHOUT LONG-TERM CURRENT USE OF INSULIN (HCC): ICD-10-CM

## 2025-08-22 LAB
ANION GAP SERPL CALCULATED.3IONS-SCNC: 15 MMOL/L (ref 7–16)
BUN SERPL-MCNC: 11 MG/DL (ref 6–20)
CALCIUM SERPL-MCNC: 9.7 MG/DL (ref 8.6–10)
CHLORIDE SERPL-SCNC: 99 MMOL/L (ref 98–107)
CO2 SERPL-SCNC: 26 MMOL/L (ref 22–29)
CREAT SERPL-MCNC: 0.7 MG/DL (ref 0.5–1)
GFR, ESTIMATED: >90 ML/MIN/1.73M2
GLUCOSE SERPL-MCNC: 364 MG/DL (ref 74–99)
POTASSIUM SERPL-SCNC: 4.4 MMOL/L (ref 3.5–5.1)
SODIUM SERPL-SCNC: 140 MMOL/L (ref 136–145)

## 2025-08-22 PROCEDURE — 80048 BASIC METABOLIC PNL TOTAL CA: CPT

## 2025-08-22 PROCEDURE — 36415 COLL VENOUS BLD VENIPUNCTURE: CPT

## 2025-08-25 DIAGNOSIS — M17.0 PRIMARY OSTEOARTHRITIS OF BOTH KNEES: ICD-10-CM

## 2025-08-25 RX ORDER — TRAMADOL HYDROCHLORIDE 50 MG/1
50 TABLET ORAL EVERY 8 HOURS PRN
Qty: 90 TABLET | Refills: 0 | Status: SHIPPED | OUTPATIENT
Start: 2025-08-25 | End: 2025-09-24

## 2025-08-29 ENCOUNTER — OFFICE VISIT (OUTPATIENT)
Dept: FAMILY MEDICINE CLINIC | Age: 55
End: 2025-08-29
Payer: COMMERCIAL

## 2025-08-29 VITALS
WEIGHT: 293 LBS | BODY MASS INDEX: 52.86 KG/M2 | TEMPERATURE: 97.7 F | SYSTOLIC BLOOD PRESSURE: 134 MMHG | HEART RATE: 84 BPM | DIASTOLIC BLOOD PRESSURE: 86 MMHG | OXYGEN SATURATION: 94 %

## 2025-08-29 DIAGNOSIS — E11.42 TYPE 2 DIABETES MELLITUS WITH DIABETIC POLYNEUROPATHY, WITHOUT LONG-TERM CURRENT USE OF INSULIN (HCC): ICD-10-CM

## 2025-08-29 DIAGNOSIS — L73.2 HIDRADENITIS SUPPURATIVA: ICD-10-CM

## 2025-08-29 DIAGNOSIS — I10 ESSENTIAL HYPERTENSION: Primary | ICD-10-CM

## 2025-08-29 PROCEDURE — 3051F HG A1C>EQUAL 7.0%<8.0%: CPT | Performed by: PHYSICIAN ASSISTANT

## 2025-08-29 PROCEDURE — 99213 OFFICE O/P EST LOW 20 MIN: CPT | Performed by: PHYSICIAN ASSISTANT

## 2025-08-29 PROCEDURE — 3079F DIAST BP 80-89 MM HG: CPT | Performed by: PHYSICIAN ASSISTANT

## 2025-08-29 PROCEDURE — 3075F SYST BP GE 130 - 139MM HG: CPT | Performed by: PHYSICIAN ASSISTANT

## 2025-08-29 RX ORDER — CLINDAMYCIN PHOSPHATE 10 UG/ML
LOTION TOPICAL 2 TIMES DAILY
Qty: 60 ML | Refills: 5 | Status: SHIPPED | OUTPATIENT
Start: 2025-08-29

## 2025-08-29 RX ORDER — MULTIVITAMIN WITH IRON
1 TABLET ORAL DAILY
COMMUNITY

## 2025-08-29 RX ORDER — BLOOD-GLUCOSE METER
1 KIT MISCELLANEOUS DAILY
Qty: 1 KIT | Refills: 0 | Status: SHIPPED | OUTPATIENT
Start: 2025-08-29

## 2025-08-31 ENCOUNTER — PATIENT MESSAGE (OUTPATIENT)
Dept: FAMILY MEDICINE CLINIC | Age: 55
End: 2025-08-31

## 2025-08-31 DIAGNOSIS — E03.9 ACQUIRED HYPOTHYROIDISM: ICD-10-CM

## 2025-09-03 RX ORDER — GLUCOSAMINE HCL/CHONDROITIN SU 500-400 MG
CAPSULE ORAL
Qty: 100 STRIP | Refills: 5 | Status: SHIPPED | OUTPATIENT
Start: 2025-09-03

## 2025-09-03 RX ORDER — AVOBENZONE, HOMOSALATE, OCTISALATE, OCTOCRYLENE 30; 40; 45; 26 MG/ML; MG/ML; MG/ML; MG/ML
1 CREAM TOPICAL DAILY
Qty: 100 EACH | Refills: 5 | Status: SHIPPED | OUTPATIENT
Start: 2025-09-03

## 2025-09-03 RX ORDER — LEVOTHYROXINE SODIUM 88 UG/1
88 TABLET ORAL DAILY
Qty: 90 TABLET | Refills: 1 | Status: SHIPPED | OUTPATIENT
Start: 2025-09-03

## (undated) DEVICE — FORCEPS BX L240CM JAW DIA2.4MM WRK CHN 2.8MM ORNG L CAP W/

## (undated) DEVICE — SNARE VASC L240CM LOOP W10MM SHTH DIA2.4MM RND STIFF CLD

## (undated) DEVICE — TRAP POLYP ETRAP

## (undated) DEVICE — Z DISCONTINUED NO SUB IDED TUBING ETCO2 AD L6.5FT NSL ORAL CVD PRNG NONFLARED TIP OVR

## (undated) DEVICE — CONTAINER SPEC 60ML PH 7NEUTRAL BUFF FRMLN RDY TO USE

## (undated) DEVICE — DEFENDO AIR WATER SUCTION AND BIOPSY VALVE KIT FOR  OLYMPUS: Brand: DEFENDO AIR/WATER/SUCTION AND BIOPSY VALVE

## (undated) DEVICE — CONNECTOR IRRIGATION AUXILIARY H2O JET W/ PRT MTL THRD HYDR

## (undated) DEVICE — SPONGE GZ W4XL4IN RAYON POLY FILL CVR W/ NONWOVEN FAB